# Patient Record
Sex: MALE | Race: WHITE | NOT HISPANIC OR LATINO | Employment: OTHER | URBAN - METROPOLITAN AREA
[De-identification: names, ages, dates, MRNs, and addresses within clinical notes are randomized per-mention and may not be internally consistent; named-entity substitution may affect disease eponyms.]

---

## 2023-01-25 ENCOUNTER — HOSPITAL ENCOUNTER (INPATIENT)
Facility: HOSPITAL | Age: 53
LOS: 14 days | Discharge: NURSING, REHAB OR LONG-TERM CARE | DRG: 064 | End: 2023-02-08
Attending: EMERGENCY MEDICINE | Admitting: INTERNAL MEDICINE
Payer: MEDICAID

## 2023-01-25 DIAGNOSIS — I63.9 CVA (CEREBRAL VASCULAR ACCIDENT): ICD-10-CM

## 2023-01-25 DIAGNOSIS — I63.9 STROKE: ICD-10-CM

## 2023-01-25 PROBLEM — I10 PRIMARY HYPERTENSION: Status: ACTIVE | Noted: 2023-01-25

## 2023-01-25 LAB
ALBUMIN SERPL-MCNC: 3.9 G/DL (ref 3.5–5)
ALBUMIN/GLOB SERPL: 1.1 RATIO (ref 1.1–2)
ALP SERPL-CCNC: 108 UNIT/L (ref 40–150)
ALT SERPL-CCNC: 30 UNIT/L (ref 0–55)
APTT PPP: 26 SECONDS (ref 23.2–33.7)
AST SERPL-CCNC: 20 UNIT/L (ref 5–34)
AV PEAK GRADIENT: 6 MMHG
AV VELOCITY RATIO: 0.45
BASOPHILS # BLD AUTO: 0.04 X10(3)/MCL (ref 0–0.2)
BASOPHILS NFR BLD AUTO: 0.5 %
BILIRUBIN DIRECT+TOT PNL SERPL-MCNC: 1.4 MG/DL
BSA FOR ECHO PROCEDURE: 2.4 M2
BUN SERPL-MCNC: 13 MG/DL (ref 8.4–25.7)
CALCIUM SERPL-MCNC: 9.2 MG/DL (ref 8.4–10.2)
CHLORIDE SERPL-SCNC: 105 MMOL/L (ref 98–107)
CHOLEST SERPL-MCNC: 180 MG/DL
CHOLEST/HDLC SERPL: 5 {RATIO} (ref 0–5)
CO2 SERPL-SCNC: 24 MMOL/L (ref 22–29)
CREAT SERPL-MCNC: 0.92 MG/DL (ref 0.73–1.18)
CV ECHO LV RWT: 0.57 CM
DOP CALC AO PEAK VEL: 1.19 M/S
DOP CALC LVOT AREA: 3.7 CM2
DOP CALC LVOT DIAMETER: 2.16 CM
DOP CALC LVOT PEAK VEL: 0.54 M/S
DOP CALC LVOT STROKE VOLUME: 40.65 CM3
DOP CALC MV VTI: 21.2 CM
DOP CALCLVOT PEAK VEL VTI: 11.1 CM
E WAVE DECELERATION TIME: 251.22 MSEC
E/A RATIO: 3.93
ECHO LV POSTERIOR WALL: 1.56 CM (ref 0.6–1.1)
EJECTION FRACTION: 26 %
EOSINOPHIL # BLD AUTO: 0.12 X10(3)/MCL (ref 0–0.9)
EOSINOPHIL NFR BLD AUTO: 1.5 %
ERYTHROCYTE [DISTWIDTH] IN BLOOD BY AUTOMATED COUNT: 14.9 % (ref 11.5–17)
FRACTIONAL SHORTENING: -97 % (ref 28–44)
GFR SERPLBLD CREATININE-BSD FMLA CKD-EPI: >60 MLS/MIN/1.73/M2
GLOBULIN SER-MCNC: 3.7 GM/DL (ref 2.4–3.5)
GLUCOSE SERPL-MCNC: 229 MG/DL (ref 74–100)
HCT VFR BLD AUTO: 44.4 % (ref 42–52)
HDLC SERPL-MCNC: 39 MG/DL (ref 35–60)
HGB BLD-MCNC: 14.2 GM/DL (ref 14–18)
IMM GRANULOCYTES # BLD AUTO: 0.03 X10(3)/MCL (ref 0–0.04)
IMM GRANULOCYTES NFR BLD AUTO: 0.4 %
INR BLD: 1.1 (ref 0–1.3)
INTERVENTRICULAR SEPTUM: 1.52 CM (ref 0.6–1.1)
LDLC SERPL CALC-MCNC: 125 MG/DL (ref 50–140)
LEFT ATRIUM SIZE: 5.07 CM
LEFT INTERNAL DIMENSION IN SYSTOLE: 10.81 CM (ref 2.1–4)
LEFT VENTRICLE DIASTOLIC VOLUME INDEX: 48.92 ML/M2
LEFT VENTRICLE DIASTOLIC VOLUME: 113.01 ML
LEFT VENTRICLE MASS INDEX: 167 G/M2
LEFT VENTRICLE SYSTOLIC VOLUME INDEX: 33.5 ML/M2
LEFT VENTRICLE SYSTOLIC VOLUME: 77.46 ML
LEFT VENTRICULAR INTERNAL DIMENSION IN DIASTOLE: 5.49 CM (ref 3.5–6)
LEFT VENTRICULAR MASS: 386.61 G
LVOT MG: 0.83 MMHG
LVOT MV: 0.44 CM/S
LYMPHOCYTES # BLD AUTO: 1.16 X10(3)/MCL (ref 0.6–4.6)
LYMPHOCYTES NFR BLD AUTO: 14.8 %
MCH RBC QN AUTO: 29.5 PG
MCHC RBC AUTO-ENTMCNC: 32 MG/DL (ref 33–36)
MCV RBC AUTO: 92.1 FL (ref 80–94)
MONOCYTES # BLD AUTO: 0.5 X10(3)/MCL (ref 0.1–1.3)
MONOCYTES NFR BLD AUTO: 6.4 %
MV MEAN GRADIENT: 2 MMHG
MV PEAK A VEL: 0.3 M/S
MV PEAK E VEL: 1.18 M/S
MV PEAK GRADIENT: 5 MMHG
MV STENOSIS PRESSURE HALF TIME: 72.85 MS
MV VALVE AREA BY CONTINUITY EQUATION: 1.92 CM2
MV VALVE AREA P 1/2 METHOD: 3.02 CM2
NEUTROPHILS # BLD AUTO: 5.98 X10(3)/MCL (ref 2.1–9.2)
NEUTROPHILS NFR BLD AUTO: 76.4 %
PISA MRMAX VEL: 4.48 M/S
PISA TR MAX VEL: 2.83 M/S
PLATELET # BLD AUTO: 187 X10(3)/MCL (ref 130–400)
PMV BLD AUTO: 11.3 FL (ref 7.4–10.4)
POTASSIUM SERPL-SCNC: 4.4 MMOL/L (ref 3.5–5.1)
PROT SERPL-MCNC: 7.6 GM/DL (ref 6.4–8.3)
PROTHROMBIN TIME: 14.1 SECONDS (ref 12.5–14.5)
PV PEAK VELOCITY: 0.69 CM/S
RA PRESSURE: 15 MMHG
RBC # BLD AUTO: 4.82 X10(6)/MCL (ref 4.7–6.1)
RIGHT VENTRICULAR END-DIASTOLIC DIMENSION: 2.98 CM
SODIUM SERPL-SCNC: 137 MMOL/L (ref 136–145)
TR MAX PG: 32 MMHG
TRIGL SERPL-MCNC: 79 MG/DL (ref 34–140)
TROPONIN I SERPL-MCNC: 0.05 NG/ML (ref 0–0.04)
TROPONIN I SERPL-MCNC: 0.05 NG/ML (ref 0–0.04)
TROPONIN I SERPL-MCNC: 0.06 NG/ML (ref 0–0.04)
TSH SERPL-ACNC: 1.8 UIU/ML (ref 0.35–4.94)
TV REST PULMONARY ARTERY PRESSURE: 47 MMHG
VLDLC SERPL CALC-MCNC: 16 MG/DL
WBC # SPEC AUTO: 7.8 X10(3)/MCL (ref 4.5–11.5)

## 2023-01-25 PROCEDURE — 84443 ASSAY THYROID STIM HORMONE: CPT | Performed by: EMERGENCY MEDICINE

## 2023-01-25 PROCEDURE — 36415 COLL VENOUS BLD VENIPUNCTURE: CPT | Performed by: EMERGENCY MEDICINE

## 2023-01-25 PROCEDURE — 84484 ASSAY OF TROPONIN QUANT: CPT | Performed by: EMERGENCY MEDICINE

## 2023-01-25 PROCEDURE — 20000000 HC ICU ROOM

## 2023-01-25 PROCEDURE — 80053 COMPREHEN METABOLIC PANEL: CPT | Performed by: EMERGENCY MEDICINE

## 2023-01-25 PROCEDURE — 93010 EKG 12-LEAD: ICD-10-PCS | Mod: ,,, | Performed by: INTERNAL MEDICINE

## 2023-01-25 PROCEDURE — 85730 THROMBOPLASTIN TIME PARTIAL: CPT | Performed by: EMERGENCY MEDICINE

## 2023-01-25 PROCEDURE — 96376 TX/PRO/DX INJ SAME DRUG ADON: CPT

## 2023-01-25 PROCEDURE — 85610 PROTHROMBIN TIME: CPT | Performed by: EMERGENCY MEDICINE

## 2023-01-25 PROCEDURE — 96366 THER/PROPH/DIAG IV INF ADDON: CPT

## 2023-01-25 PROCEDURE — 93005 ELECTROCARDIOGRAM TRACING: CPT

## 2023-01-25 PROCEDURE — 96365 THER/PROPH/DIAG IV INF INIT: CPT

## 2023-01-25 PROCEDURE — 25000003 PHARM REV CODE 250: Performed by: EMERGENCY MEDICINE

## 2023-01-25 PROCEDURE — 85025 COMPLETE CBC W/AUTO DIFF WBC: CPT | Performed by: EMERGENCY MEDICINE

## 2023-01-25 PROCEDURE — 80061 LIPID PANEL: CPT | Performed by: EMERGENCY MEDICINE

## 2023-01-25 PROCEDURE — 99285 EMERGENCY DEPT VISIT HI MDM: CPT | Mod: 25

## 2023-01-25 PROCEDURE — 93010 ELECTROCARDIOGRAM REPORT: CPT | Mod: ,,, | Performed by: INTERNAL MEDICINE

## 2023-01-25 PROCEDURE — 96375 TX/PRO/DX INJ NEW DRUG ADDON: CPT

## 2023-01-25 RX ORDER — LABETALOL HYDROCHLORIDE 5 MG/ML
10 INJECTION, SOLUTION INTRAVENOUS
Status: COMPLETED | OUTPATIENT
Start: 2023-01-25 | End: 2023-01-25

## 2023-01-25 RX ORDER — LORAZEPAM 2 MG/ML
0.5 INJECTION INTRAMUSCULAR EVERY 10 MIN PRN
Status: DISCONTINUED | OUTPATIENT
Start: 2023-01-25 | End: 2023-01-25

## 2023-01-25 RX ORDER — NICARDIPINE HYDROCHLORIDE 0.2 MG/ML
2.5 INJECTION INTRAVENOUS CONTINUOUS
Status: DISCONTINUED | OUTPATIENT
Start: 2023-01-25 | End: 2023-01-25

## 2023-01-25 RX ORDER — ONDANSETRON 2 MG/ML
4 INJECTION INTRAMUSCULAR; INTRAVENOUS EVERY 8 HOURS PRN
Status: DISCONTINUED | OUTPATIENT
Start: 2023-01-25 | End: 2023-02-08 | Stop reason: HOSPADM

## 2023-01-25 RX ORDER — NICARDIPINE HYDROCHLORIDE 0.2 MG/ML
2 INJECTION INTRAVENOUS CONTINUOUS
Status: DISPENSED | OUTPATIENT
Start: 2023-01-25 | End: 2023-01-26

## 2023-01-25 RX ORDER — CLOPIDOGREL BISULFATE 75 MG/1
300 TABLET ORAL
Status: COMPLETED | OUTPATIENT
Start: 2023-01-25 | End: 2023-01-25

## 2023-01-25 RX ORDER — NAPROXEN SODIUM 220 MG/1
324 TABLET, FILM COATED ORAL ONCE
Status: DISCONTINUED | OUTPATIENT
Start: 2023-01-25 | End: 2023-02-08 | Stop reason: HOSPADM

## 2023-01-25 RX ORDER — SODIUM CHLORIDE 0.9 % (FLUSH) 0.9 %
10 SYRINGE (ML) INJECTION
Status: DISCONTINUED | OUTPATIENT
Start: 2023-01-25 | End: 2023-02-08 | Stop reason: HOSPADM

## 2023-01-25 RX ORDER — MUPIROCIN 20 MG/G
OINTMENT TOPICAL 2 TIMES DAILY
Status: DISPENSED | OUTPATIENT
Start: 2023-01-25 | End: 2023-01-30

## 2023-01-25 RX ORDER — NAPROXEN SODIUM 220 MG/1
324 TABLET, FILM COATED ORAL ONCE
Status: COMPLETED | OUTPATIENT
Start: 2023-01-25 | End: 2023-01-25

## 2023-01-25 RX ORDER — LORAZEPAM 1 MG/1
1 TABLET ORAL EVERY 6 HOURS PRN
Status: DISCONTINUED | OUTPATIENT
Start: 2023-01-25 | End: 2023-02-08 | Stop reason: HOSPADM

## 2023-01-25 RX ORDER — CLOPIDOGREL BISULFATE 75 MG/1
75 TABLET ORAL DAILY
Status: DISCONTINUED | OUTPATIENT
Start: 2023-01-26 | End: 2023-02-08 | Stop reason: HOSPADM

## 2023-01-25 RX ADMIN — LABETALOL HYDROCHLORIDE 10 MG: 5 INJECTION, SOLUTION INTRAVENOUS at 08:01

## 2023-01-25 RX ADMIN — CLOPIDOGREL BISULFATE 300 MG: 75 TABLET ORAL at 01:01

## 2023-01-25 RX ADMIN — LABETALOL HYDROCHLORIDE 10 MG: 5 INJECTION, SOLUTION INTRAVENOUS at 07:01

## 2023-01-25 RX ADMIN — ASPIRIN 324 MG: 81 TABLET, CHEWABLE ORAL at 08:01

## 2023-01-25 RX ADMIN — NICARDIPINE HYDROCHLORIDE 2.5 MG/HR: 0.2 INJECTION, SOLUTION INTRAVENOUS at 10:01

## 2023-01-25 RX ADMIN — NICARDIPINE HYDROCHLORIDE 0.5 MG/HR: 0.2 INJECTION, SOLUTION INTRAVENOUS at 05:01

## 2023-01-25 RX ADMIN — LABETALOL HYDROCHLORIDE 10 MG: 5 INJECTION, SOLUTION INTRAVENOUS at 09:01

## 2023-01-25 NOTE — CONSULTS
Vascular Neurology Brief Note    Recommendations for stroke management discussed with ED physician.  53 y/o man presenting with R sided weakness (LKN >24 hrs).  MRI confirms acute small vessel stroke in L CR.  Recommend admission for PT/OT/SLP, and further workup.  Load with clopidogrel 300 mg x 1, then continue 75 mg daily.  Also start ASA 81 mg daily and high intensity statin.  BP roughly 160-180 systolic (given troponin leak).    Nhung Anderson MD  Vascular Neurology

## 2023-01-25 NOTE — ED PROVIDER NOTES
Encounter Date: 1/25/2023       History     Chief Complaint   Patient presents with    Cerebrovascular Accident     Pt and wife report pt having slurred speech and rt sided weakness with difficulty walking. Rt sided facial droop noted. Symptom started yesterday am.     Patient presents with his wife said that yesterday morning when he woke up at 5:30 a.m. is normal time.  His right face was drooping his speech was slurred his right arm and right leg were working right.  Wife and patient states there were okay when he went to bed Monday night.  Did not get any care for this yesterday came to emergency department this morning.      Past medical history he had COVID in April of 2021.  He has no prescription medications he has never been diagnosed with hypertension he has never had a CVA before.  He does not have any history of court heart irregularities or heart disease.  He does not get regular health care.  Patient said that symptoms have been persistent having necessarily worsened but have improved.    Wife said that a friend told her about a shot that she could take when you have a stroke.  Asked her if it was tPA and the wife said yes.  I did explain to her that there is a 4-1/2 hour window for that.  And his time of onset his minimum of 24 hours.  Probably longer than that because it happened sometime in the sleepy is not a candidate for thrombolytics.    Patient denies headache patient denies nausea vomiting patient denies chest pain or shortness of breath.  Patient says the left side is fine.  Both wife and patient said his speech is slightly off.  He does have some facial asymmetry he does not have any visual field issues when asked in checked.  Social history  works as a auto body pain or.  Does not do tobacco does not do drugs does drink on the weekend maybe a 12 pack of beer on Saturdays only.    Mother is alive has survived cancers in the past dad is alive he has eye issues.      No COVID vaccines  no pneumonia vaccines no flu vaccines tetanus less than 5 years.      Past medical history patient denies any history of hypertension but wife does say when he was taken over-the-counter cough and cold medications with COVID his blood pressure was elevated.    Past surgical history ingrown toenail.    NO  physician the patient says many years ago he used to see Dr. Moon in [place]  longer has a family physician.   He has not had any recent healthcare.    Review of patient's allergies indicates:  No Known Allergies  No past medical history on file.  No past surgical history on file.  No family history on file.     Review of Systems   Constitutional:  Negative for fever.   HENT:  Negative for sore throat.    Eyes: Negative.    Respiratory:  Negative for shortness of breath.    Cardiovascular:  Negative for chest pain.   Gastrointestinal:  Negative for nausea.   Endocrine: Negative.    Genitourinary:  Negative for dysuria.   Musculoskeletal:  Negative for back pain.   Skin:  Negative for rash.   Allergic/Immunologic: Negative.    Neurological:  Positive for facial asymmetry, speech difficulty and weakness (Right arm right leg weakness slightly slurred speech right facial droop). Negative for headaches.   Hematological:  Does not bruise/bleed easily.   Psychiatric/Behavioral: Negative.     All other systems reviewed and are negative.    Physical Exam     Initial Vitals [01/25/23 0658]   BP Pulse Resp Temp SpO2   (!) 211/128 80 18 98 °F (36.7 °C) 98 %      MAP       --         Physical Exam    Nursing note and vitals reviewed.  Constitutional: He appears well-developed and well-nourished.   Barrel chested heavyset white male hopefully awake oriented speech is easily understood there is a slight abnormality in his speech not necessarily grossly slurred but just off.  Sometimes stumbles for word.  Does not have expressive or receptive aphasia that I can detect does have obvious right-sided facial weakness   HENT:   Head:  Normocephalic and atraumatic.   Right Ear: Tympanic membrane and external ear normal.   Left Ear: Tympanic membrane and external ear normal.   Nose: Nose normal.   Mouth/Throat: Oropharynx is clear and moist and mucous membranes are normal. Oral lesions: moist muc memb.   Eyes: Conjunctivae and EOM are normal. Pupils are equal, round, and reactive to light.   There is no evidence of any visual field cut peripheral vision intact bilaterally   Neck: Neck supple. No thyromegaly present. No tracheal deviation present. No JVD present.   Normal range of motion.  Cardiovascular:  Normal rate, regular rhythm, normal heart sounds and intact distal pulses.     Exam reveals no gallop and no friction rub.       No murmur heard.  I do not appreciate irregular heart rate   Pulmonary/Chest: Breath sounds normal. No stridor. No respiratory distress. He has no wheezes. He has no rhonchi. He has no rales. He exhibits no tenderness.   Abdominal: Abdomen is soft. Bowel sounds are normal. He exhibits no distension and no mass. No signs of injury. There is no abdominal tenderness.   Genitourinary:    Genitourinary Comments: No CVA tenderness     Musculoskeletal:         General: No tenderness or edema. Normal range of motion.      Cervical back: Normal range of motion and neck supple.     Lymphadenopathy:     He has no cervical adenopathy.   Neurological: He is alert and oriented to person, place, and time. A cranial nerve deficit (Slight drooping of the right side of the face) is present. No sensory deficit. GCS score is 15. GCS eye subscore is 4. GCS verbal subscore is 5. GCS motor subscore is 6.   His right leg is 4/5 strength both in lifting it up at the knee and foot plantar and dorsiflexion.  Extensor hallucis longus all 4/5 left side is 5/5  strength 4/5 in the right arm 5/5 in the left arm.  He has mild discoordination mild ataxia right arm and right leg   Skin: Skin is warm and dry. Capillary refill takes 2 to 3 seconds. No  rash noted.   Psychiatric: He has a normal mood and affect. His behavior is normal. Judgment and thought content normal.       ED Course   Critical Care    Date/Time: 1/25/2023 2:43 PM  Performed by: Andres Balderas MD  Authorized by: Andres Balderas MD   Direct patient critical care time: 18 minutes  Additional history critical care time: 5 minutes  Ordering / reviewing critical care time: 15 minutes  Documentation critical care time: 12 minutes  Consulting other physicians critical care time: 9 minutes  Consult with family critical care time: 12 minutes  Total critical care time (exclusive of procedural time) : 71 minutes  Critical care time was exclusive of separately billable procedures and treating other patients.      Labs Reviewed   COMPREHENSIVE METABOLIC PANEL - Abnormal; Notable for the following components:       Result Value    Glucose Level 229 (*)     Globulin 3.7 (*)     All other components within normal limits   TROPONIN I - Abnormal; Notable for the following components:    Troponin-I 0.064 (*)     All other components within normal limits   CBC WITH DIFFERENTIAL - Abnormal; Notable for the following components:    MCHC 32.0 (*)     MPV 11.3 (*)     All other components within normal limits   TROPONIN I - Abnormal; Notable for the following components:    Troponin-I 0.049 (*)     All other components within normal limits   TROPONIN I - Abnormal; Notable for the following components:    Troponin-I 0.048 (*)     All other components within normal limits   PROTIME-INR - Normal   TSH - Normal   APTT - Normal   CBC W/ AUTO DIFFERENTIAL    Narrative:     The following orders were created for panel order CBC W/ AUTO DIFFERENTIAL.  Procedure                               Abnormality         Status                     ---------                               -----------         ------                     CBC with Differential[[id number]]        Abnormal            Final result                 Please view  results for these tests on the individual orders.   LIPID PANEL   TROPONIN I   POCT GLUCOSE, HAND-HELD DEVICE     Recent Results (from the past 9 hour(s))   Echo Saline Bubble? No    Collection Time: 01/25/23 11:04 AM   Result Value Ref Range    Left Ventricular Outflow Tract Mean Gradient 0.83 mmHg    Left Ventricular Outflow Tract Mean Velocity 0.44 cm/s    MV peak gradient 5 mmHg    MV mean gradient 2 mmHg    MV stenosis pressure 1/2 time 72.85 ms    Mr max scar 4.48 m/s    MV VTI 21.2 cm    TR Max Scar 2.83 m/s    Ao peak scar 1.19 m/s    Posterior Wall 1.56 (A) 0.6 - 1.1 cm    LVOT diameter 2.16 cm    LVOT peak scar 0.54 m/s    LVOT peak VTI 11.10 cm    E wave deceleration time 251.22 msec    MV Peak A Scar 0.30 m/s    MV Peak E Scar 1.18 m/s    IVS 1.52 0.6 - 1.1 cm    LVIDd 5.49 3.5 - 6.0 cm    LVIDs 10.81 (A) 2.1 - 4.0 cm    PV PEAK VELOCITY 0.69 cm/s    LV Systolic Volume 77.46 mL    LV Diastolic Volume 113.01 mL    RVDD 2.98 cm    LA size 5.07 cm    FS -97 %    LV mass 386.61 g    Left Ventricle Relative Wall Thickness 0.57 cm    AV Velocity Ratio 0.45     MV valve area p 1/2 method 3.02 cm2    MV valve area by continuity eq 1.92 cm2    E/A ratio 3.93     LVOT area 3.7 cm2    LVOT stroke volume 40.65 cm3    AV peak gradient 6 mmHg    LV Systolic Volume Index 33.5 mL/m2    LV Diastolic Volume Index 48.92 mL/m2    LV Mass Index 167 g/m2    Triscuspid Valve Regurgitation Peak Gradient 32 mmHg    BSA 2.4 m2    EF 26 %    Right Atrial Pressure (from IVC) 15 mmHg    TV rest pulmonary artery pressure 47 mmHg   Troponin I    Collection Time: 01/25/23 12:59 PM   Result Value Ref Range    Troponin-I 0.049 (H) 0.000 - 0.045 ng/mL   Troponin I    Collection Time: 01/25/23  4:57 PM   Result Value Ref Range    Troponin-I 0.048 (H) 0.000 - 0.045 ng/mL         EKG Readings: (Independently Interpreted)   Initial Reading: No STEMI. Rhythm: Sinus Arrhythmia. Ectopy: PACs Less than or equal to 6/min. T Waves Flipped: I, V5 and V6.    EKG is performed at 7:44 a.m. 93 per sinus rhythm with PACs there is T-wave inversion in leads 5 6 leads 1.  There is no ST segment elevation   ECG Results              ECG 12 lead (Final result)  Result time 01/25/23 11:19:50      Final result by Nan Arthur [facility] (01/25/23 11:19:50)                   Narrative:    Test Reason : I63.9,    Vent. Rate : 093 BPM     Atrial Rate : 093 BPM     P-R Int : 116 ms          QRS Dur : 098 ms      QT Int : 378 ms       P-R-T Axes : 065 012 157 degrees     QTc Int : 469 ms    Sinus rhythm with Premature atrial complexes  ST and T wave abnormality, consider lateral ischemia  Prolonged QT  Abnormal ECG  No previous ECGs available  Confirmed by Benjamín Redmond MD ([id number]) on 1/25/2023 11:19:42 AM    Referred By: AKBAR   SELF           Confirmed By:Benjamín Redmond MD                                  Imaging Results              MRI Brain Without Contrast (Final result)  Result time 01/25/23 10:00:00      Final result by Andres Romero MD (01/25/23 10:00:00)                   Impression:      1. Mild motion artifact  2. Somewhat bilobed focus of abnormal signal intensity at the posterior left corona radiata/superior left thalamus compatible with a focus of acute ischemia measuring approximately 2 x 1 cm  3. Generalized cerebral and cerebellar atrophy  4. Scattered foci of abnormal signal intensity at the periventricular and subcortical white matter suspicious for foci of ischemia versus gliosis      Electronically signed by: Andres Romero  Date:    01/25/2023  Time:    10:00               Narrative:    EXAMINATION:  MRI BRAIN WITH CONTRAST:    CLINICAL HISTORY:  , Stroke, follow up;    COMPARISON:  None available    FINDINGS:  Serial axial,coronal, and sagittal 1.5 Carina images were obtained of the brain using T1 and T2- weighted techniques the administration of IV contrast. Ventricles, cisterns, and sulci are mildly prominent size.  There is no evidence of midline shift,  mass effect, or abnormal extra-axial fluid collections.  Flow void is noted to the superior sagittal sinus and visualized intracranial vessels on T2 sequence imaging.  The right vertebral artery slightly dominant.  There is minimal scattered mucosal thickening at the ethmoid sinuses.  Orbital globes are symmetric in size shape and signal intensity.  Cerebellar tonsils extend caudally to the level of the foramen magnum.  There is focal abnormal somewhat bilobed signal intensity at the posterior left corona radiata and superior left thalamus on diffusion-weighted imaging compatible with a focus of acute ischemia measuring approximately 2 x 1 cm.  No other focus of abnormal signal intensity is identified on diffusion weighted imaging.  There is mild motion artifact.  Scattered small foci of abnormal signal intensity are evident at the periventricular and subcortical white matter on FLAIR sequence imaging.                                       US Carotid Bilateral (Final result)  Result time 01/25/23 09:49:45      Final result by Andres Romero MD (01/25/23 09:49:45)                   Impression:      1. No significant localized atherosclerotic plaque formation or stenosis noted to the visualized portions of the carotid arteries bilaterally.      Electronically signed by: Andres Romero  Date:    01/25/2023  Time:    09:49               Narrative:    EXAMINATION:  US CAROTID BILATERAL    CLINICAL HISTORY:  , Cerebral infarction, unspecified.    COMPARISON:  None available    FINDINGS:  Real-time imaging was performed through the right and left carotid arteries using duplex Doppler imaging. NASCET utilized. Mild scattered plaque formation at the carotid bulbs and proximal internal are carotid arteries bilaterally with no significant stenosis identified.  Antegrade flow is evident within the vertebral arteries bilaterally.    MEASUREMENTS:    Right Systolic Velocities(cm/s):    Internal Carotid: 65.3 cm/second    Common  Carotid: 78.1 cm/second    Right IC:CC Ratio: 0.8    Left Systolic Velocities(cm/s):    Internal Carotid: 77.9 cm/second    Common Carotid: 80.1 cm/second    Left IC:CC Ratio: 1.0                                       X-Ray Chest AP Portable (Final result)  Result time 01/25/23 09:25:06      Final result by Andres Romero MD (01/25/23 09:25:06)                   Impression:      1. Mild cardiomegaly  2. Prominent pulmonary vasculature  3. Thoracic spondylosis      Electronically signed by: Andres Romero  Date:    01/25/2023  Time:    09:25               Narrative:    EXAMINATION:  XR CHEST AP PORTABLE    CLINICAL HISTORY:  Stroke;, .    COMPARISON:  None available    FINDINGS:  An AP view or more reveals the heart to be mildly enlarged.  The trachea is midline.  Pulmonary vasculature is prominent.  No consolidative infiltrate or effusion is seen.  Degenerative changes are noted to the thoracic spine.                                       CT Head Without Contrast (Final result)  Result time 01/25/23 07:53:25      Final result by Sunil Coffman MD (01/25/23 07:53:25)                   Impression:      No acute intracranial abnormality.    Supratentorial white matter changes, while nonspecific, most likely sequela of chronic microvascular ischemia.      Electronically signed by: Sunil Coffman MD  Date:    01/25/2023  Time:    07:53               Narrative:    EXAMINATION:  CT of the head without contrast    CLINICAL HISTORY:  Right-sided weakness, numbness    TECHNIQUE:  Routine CT of the head was performed without intravenous contrast    Total DLP: 1787 mGy.cm    Automatic exposure control was utilized to reduce the patient's dose    COMPARISON:  None    FINDINGS:  There is no acute intracranial hemorrhage, midline shift, mass-effect, or extra-axial collection.  The ventricular system is normal in size and configuration.  There are mild patchy areas of decreased attenuation in the periventricular, deep,  subcortical white matter, while nonspecific, most commonly sequela of chronic microvascular ischemia.  No sulcal effacement.  Gray-white matter differentiation is preserved.  Visualized osseous structures are intact.  Visualized paranasal sinuses and mastoid air cells are clear                                       Medications   LORazepam injection 0.5 mg (has no administration in time range)   clopidogreL tablet 75 mg (has no administration in time range)   sodium chloride 0.9% flush 10 mL (has no administration in time range)   ondansetron injection 4 mg (has no administration in time range)   aspirin chewable tablet 324 mg (has no administration in time range)   niCARdipine 40 mg/200 mL (0.2 mg/mL) infusion (0.5 mg/hr Intravenous New Bag 1/25/23 1700)   labetaloL injection 10 mg (10 mg Intravenous Given 1/25/23 0749)   aspirin chewable tablet 324 mg (324 mg Oral Given 1/25/23 0800)   labetaloL injection 10 mg (10 mg Intravenous Given 1/25/23 0827)   labetaloL injection 10 mg (10 mg Intravenous Given 1/25/23 0910)   clopidogreL tablet 300 mg (300 mg Oral Given 1/25/23 1320)     Medical Decision Making:   Initial Assessment:   Pleasant male who presents to the emergency department woke up yesterday morning with the same symptoms right face speech right arm right leg symptoms.  Did not get care yesterday  Thought maybe was discussed he was taken the cough and cold medication contains phenylephrine.  Differential Diagnosis:   Stroke, hemorrhagic versus ischemic, neurological disorder.  Bell's palsy.  Clinical Tests:   Lab Tests: Ordered  Radiological Study: Ordered  Additional MDM:     NIH Stroke Scale:   Level of consciousness = 0 - alert  LOC questions = 0 - answers both correctly  LOC commands = 0 - performs both correctly  Best gaze = 0 - normal  Visual = 0 - no visual loss  Facial palsy = 1 - minor  Motor left arm =  0 - no drift  Motor right arm =  1 - drift  Motor left leg = 0 - no drift  Motor right leg =  1  - drift  Limb ataxia = 2 - present in two limbs  Sensory = 0 - normal  Best language = 0 - no aphasia  Dysarthria = 1 - mild to moderate dysarthria  Extinction and inattention = 0 - no neglect  NIH Stroke Scale Total = 6        ED Course as of 01/25/23 1729   Wed Jan 25, 2023   0800 I spoke to the wife and the patient again told them the CT scan did not show a hemorrhagic event no bleeding stroke.  I also explained that while it did not show any ischemic stroke a blocked up blood vessel type stroke that clinically he had had a stroke.  The wife asked me about Bell's palsy I explained to her the Bell's palsy does not affect the arm or the leg I told him about an MRI scan and that has been ordered.  Patient is condition is unchanged currently [DM]   0801 I did inform the family of the blood pressure treatment and may end up with an intravenous drip.  Also involving the family the slightly elevated troponin that will be repeated at 9:45 a.m.. [DM]   1036 Talk to the wife blood pressure is still up diastolic 118 nicardipine is initiated systolic is 180 talk to the wife about the negative carotid ultrasound talk to the wife about the MRI BM positive for a left thalamus left corona radiata odd ischemic infarct 2 x 1 cm.  Told the wife that we do not have an ICU bed that we may need to transfer him were going to put a transfer request in.  [DM]   1321 After speaking with Dr. Anderson neurologist oxygen her [facility] patient is going to be given Plavix 300 mg load as well as 75 mg a day his blood pressure came down it is 159 now over 99 he is relatively asymptomatic just the same things he came in with nothing worse I talked to them about the elevated glucose we do not have the echocardiogram back in Dr. Anderson recommended if the patient stays PT and speech therapy.  To help correct the deficits we fine keep the blood pressure under control [DM]   1336 The 2nd troponin has dropped very nicely.  It is barely above  the high normal now of 0.45 at 0.49    Patient has been off of the nicardipine drip.  In his blood pressure seems to be maintaining [DM]   1442 Patient's blood pressure did go back up diastolic above 43904 nicardipine restarted.  New parameters given to us by Dr. Anderson the neurologist at [facility] 150-180 systolic [DM]   1446 · The left ventricle is normal in size with moderate concentric hypertrophy and severely decreased systolic function.  · The estimated ejection fraction is 26%.  · Grade III left ventricular diastolic dysfunction.  · Mild left atrial enlargement.  · Mild tricuspid regurgitation.  · Mild mitral regurgitation.      [DM]   1449 Echocardiogram above apparently had longstanding hypertension shows a hypertrophic left ventricle with severely decreased systolic function [DM]   1450 A video cardiac consultation was obtained.  I spoke to them they said they would be glad to follow in consult the went in the room to excess and talked to the patient and his wife Lazarus Biswas was the person on the video conference [DM]      ED Course User Index  [DM] Andres Balderas MD                 Clinical Impression:   Final diagnoses:  [I63.9] Stroke  [I63.9] CVA (cerebral vascular accident)        ED Disposition Condition    Admit                 Andres Balderas MD  01/25/23 6985

## 2023-01-25 NOTE — CONSULTS
[facility] - Emergency Dept  Cardiology  Consult Note    Patient Name: Manpreet Josue  MRN: [id number]  Admission Date: 1/25/2023  Hospital Length of Stay: 0 days  Code Status: No Order   Attending Provider: Andres Balderas MD   Consulting Provider: Lazarus Biswas NP  Primary Care Physician: Primary Doctor No  Principal Problem:<principal problem not specified>    Patient information was obtained from patient, ER records, and primary team.     Consults  Subjective:     Chief Complaint:      Tele cardiology consult  Location:  [facility] in a General ER  Reason for consultation:  Elevated troponin, depressed LV function and CVA  Consulting provider:  Dr. Balderas  Duration:  Greater than 30 minutes including review of medical records, patient and provider interview    HPI:     52-year-old female unknown to our services.  His symptoms started yesterday morning with some disorientation and difficulty speaking.  He also had some weakness in his right hand and right leg.  He wrote this out most of the day went to bed this morning when he awoke his speech was more slurred.  It was decided that he should come into the ER for an evaluation.  Upon arrival systolic blood pressure was 220/125, EKG was done which showed a normal sinus rhythm and some lateral T-wave inversions.  Lab work showed a minimally elevated troponin that tapered down with the 2nd set.  Initial CT scan was negative.  Chest x-ray was consistent with some vascular congestion.  Carotid ultrasound was negative.  MRI did show left thalamic infarct.  He was seen by vascular Neurology who loaded him with Plavix aspirin and high-intensity statin.  I have also recommended PT OT.    From cardiac standpoint he states he had been short of breath since he had COVID in April of 2021.  He gets short of breath with activity and sounds like he gets orthopneic in the evenings and has to sit up to breathe with a good bit of mucus production.  He denies any overt  chest discomfort.  He denies any previous cardiac history.    Preliminary echocardiogram shows an EF of 25% with marked LVH    He had to be placed on a Cardene drip for control of his blood pressure.    No home medications  Denies smoking or drugs  ETOH on weekends       Works in a body shop as a  for 35 years     No significant family Hx CAD/CVA       No past medical history on file.    No past surgical history on file.    Review of patient's allergies indicates:  No Known Allergies    No current facility-administered medications on file prior to encounter.     No current outpatient medications on file prior to encounter.     Family Hx:  M L cancer  F L eye issues    Tobacco Use    Smoking status: Not on file    Smokeless tobacco: Not on file   Substance and Sexual Activity    Alcohol use: Not on file    Drug use: Not on file    Sexual activity: Not on file     Review of Systems   Constitutional: Negative.   HENT:  Positive for congestion.    Eyes:  Negative for blurred vision and visual disturbance.   Cardiovascular:  Positive for dyspnea on exertion and orthopnea. Negative for chest pain, claudication, irregular heartbeat, leg swelling and near-syncope.   Respiratory:  Positive for shortness of breath and sleep disturbances due to breathing.    Skin: Negative.    Gastrointestinal: Negative.    Genitourinary: Negative.    Neurological:         R UE and L weakness, facial droop    Psychiatric/Behavioral: Negative.     Allergic/Immunologic: Negative.    Objective:     Vital Signs (Most Recent):  Temp: 98 °F (36.7 °C) (01/25/23 0658)  Pulse: 74 (01/25/23 1443)  Resp: 14 (01/25/23 1443)  BP: (!) 171/96 (01/25/23 1443)  SpO2: 95 % (01/25/23 1443)   Vital Signs (24h Range):  Temp:  [98 °F (36.7 °C)] 98 °F (36.7 °C)  Pulse:  [68-94] 74  Resp:  [12-23] 14  SpO2:  [93 %-99 %] 95 %  BP: (157-215)/() 171/96     Weight: 117.9 kg (260 lb)  Body mass index is 38.4 kg/m².    SpO2: 95 %       No intake or  output data in the 24 hours ending 01/25/23 1505    Lines/Drains/Airways       Peripheral Intravenous Line  Duration                  Peripheral IV - Single Lumen 01/25/23 0705 20 G Left;Posterior Hand <1 day                    Physical Exam  Constitutional:       Appearance: Normal appearance. He is obese.   HENT:      Head: Normocephalic and atraumatic.      Nose: Nose normal.      Mouth/Throat:      Mouth: Mucous membranes are moist.   Eyes:      Conjunctiva/sclera: Conjunctivae normal.   Cardiovascular:      Rate and Rhythm: Normal rate and regular rhythm.   Pulmonary:      Effort: Pulmonary effort is normal.      Breath sounds: Normal breath sounds.   Abdominal:      General: Bowel sounds are normal.      Palpations: Abdomen is soft.   Musculoskeletal:      Cervical back: Normal range of motion.      Comments: R sided weakness    Neurological:      Mental Status: He is alert.      Sensory: Sensory deficit present.      Motor: Weakness present.   Psychiatric:         Mood and Affect: Mood normal.         Behavior: Behavior normal.       Significant Labs:   Recent Lab Results         01/25/23  1259   01/25/23  0749   01/25/23  0748   01/25/23  0743        Albumin/Globulin Ratio     1.1         Albumin     3.9         Alkaline Phosphatase     108         ALT     30         aPTT   26.0  Comment: For Minimal Heparin Infusion, the goal aPTT 64-85 seconds corresponds to an anti-Xa of 0.3-0.5.    For Low Intensity and High Intensity Heparin, the goal aPTT  seconds corresponds to an anti-Xa of 0.3-0.7           AST     20         Baso #       0.04       Basophil %       0.5       BILIRUBIN TOTAL     1.4         BUN     13.0         Calcium     9.2         Chloride     105         Cholesterol     180         CO2     24         Creatinine     0.92         eGFR     >60         Eos #       0.12       Eosinophil %       1.5       Globulin, Total     3.7         Glucose     229         HDL     39         Hematocrit        44.4       Hemoglobin       14.2       Immature Grans (Abs)       0.03       Immature Granulocytes       0.4       INR   1.10           LDL Cholesterol External     125.00         Lymph #       1.16       LYMPH %       14.8       MCH       29.5       MCHC       32.0       MCV       92.1       Mono #       0.50       Mono %       6.4       MPV       11.3       Neut #       5.98       Neut %       76.4       Platelets       187       Potassium     4.4         PROTEIN TOTAL     7.6         Protime   14.1           RBC       4.82       RDW       14.9       Sodium     137         Thyroid Stimulating Hormone     1.799         Total Cholesterol/HDL Ratio     5         Triglycerides     79         Troponin I 0.049     0.064         Very Low Density Lipoprotein     16         WBC       7.8               Significant Imaging:     EXAMINATION:   MRI BRAIN WITH CONTRAST:     CLINICAL HISTORY:   , Stroke, follow up;     COMPARISON:   None available     FINDINGS:   Serial axial,coronal, and sagittal 1.5 Craina images were obtained of the brain using T1 and T2- weighted techniques the administration of IV contrast. Ventricles, cisterns, and sulci are mildly prominent size.  There is no evidence of midline shift, mass effect, or abnormal extra-axial fluid collections.  Flow void is noted to the superior sagittal sinus and visualized intracranial vessels on T2 sequence imaging.  The right vertebral artery slightly dominant.  There is minimal scattered mucosal thickening at the ethmoid sinuses.  Orbital globes are symmetric in size shape and signal intensity.  Cerebellar tonsils extend caudally to the level of the foramen magnum.  There is focal abnormal somewhat bilobed signal intensity at the posterior left corona radiata and superior left thalamus on diffusion-weighted imaging compatible with a focus of acute ischemia measuring approximately 2 x 1 cm.  No other focus of abnormal signal intensity is identified on diffusion weighted  imaging.  There is mild motion artifact.  Scattered small foci of abnormal signal intensity are evident at the periventricular and subcortical white matter on FLAIR sequence imaging.    Impression:       1. Mild motion artifact   2. Somewhat bilobed focus of abnormal signal intensity at the posterior left corona radiata/superior left thalamus compatible with a focus of acute ischemia measuring approximately 2 x 1 cm   3. Generalized cerebral and cerebellar atrophy   4. Scattered foci of abnormal signal intensity at the periventricular and subcortical white matter suspicious for foci of ischemia versus gliosis      US Carotid Bilateral [[id number]] Resulted: 01/25/23 0949   Order Status: Completed Updated: 01/25/23 0952   Narrative:     EXAMINATION:   US CAROTID BILATERAL     CLINICAL HISTORY:   , Cerebral infarction, unspecified.     COMPARISON:   None available     FINDINGS:   Real-time imaging was performed through the right and left carotid arteries using duplex Doppler imaging. NASCET utilized. Mild scattered plaque formation at the carotid bulbs and proximal internal are carotid arteries bilaterally with no significant stenosis identified.  Antegrade flow is evident within the vertebral arteries bilaterally.     MEASUREMENTS:     Right Systolic Velocities(cm/s):     Internal Carotid: 65.3 cm/second     Common Carotid: 78.1 cm/second     Right IC:CC Ratio: 0.8     Left Systolic Velocities(cm/s):     Internal Carotid: 77.9 cm/second     Common Carotid: 80.1 cm/second     Left IC:CC Ratio: 1.0    Impression:       1. No significant localized atherosclerotic plaque formation or stenosis noted to the visualized portions of the carotid arteries bilaterally.      X-Ray Chest AP Portable [[id number]] Resulted: 01/25/23 0925   Order Status: Completed Updated: 01/25/23 0927   Narrative:     EXAMINATION:   XR CHEST AP PORTABLE     CLINICAL HISTORY:   Stroke;, .     COMPARISON:   None available     FINDINGS:   An AP view or  more reveals the heart to be mildly enlarged.  The trachea is midline.  Pulmonary vasculature is prominent.  No consolidative infiltrate or effusion is seen.  Degenerative changes are noted to the thoracic spine.    Impression:       1. Mild cardiomegaly   2. Prominent pulmonary vasculature   3. Thoracic spondylosis       Electronically signed by: Andres Romero   Date: 01/25/2023   Time: 09:25   CT Head Without Contrast [[id number]] Resulted: 01/25/23 0753   Order Status: Completed Updated: 01/25/23 0755   Narrative:     EXAMINATION:   CT of the head without contrast     CLINICAL HISTORY:   Right-sided weakness, numbness     TECHNIQUE:   Routine CT of the head was performed without intravenous contrast     Total DLP: 1787 mGy.cm     Automatic exposure control was utilized to reduce the patient's dose     COMPARISON:   None     FINDINGS:   There is no acute intracranial hemorrhage, midline shift, mass-effect, or extra-axial collection.  The ventricular system is normal in size and configuration.  There are mild patchy areas of decreased attenuation in the periventricular, deep, subcortical white matter, while nonspecific, most commonly sequela of chronic microvascular ischemia.  No sulcal effacement.  Gray-white matter differentiation is preserved.  Visualized osseous structures are intact.  Visualized paranasal sinuses and mastoid air cells are clear    Impression:       No acute intracranial abnormality.            Assessment and Plan:       Impression:  Acute CVA ? Etiology  HTN Urgency /125  > trop  - Type II leak secondary to HTN/CVA  - Abnormal EKG  Depressed LVF on Echo  - Official read pending    Plan:  Agree with admission to ICU  Permissive hypertension secondary to CVA however he is currently needing a Cardene drip to maintain systolic blood pressure in the 150s to 170s  Agree with aspirin and Plavix per Neurology  Agree with high-intensity statin  Will need to initiate heart failure  medications  Continue with telemetry monitoring will need a possibleLINQ implantation if no evidence of atrial fibrillation  May need to consider a TUSHAR if views are limited by transthoracic echo  Will need workup for diminished LV function that can be done as an outpatient at this time    Thank you for the consultation should she have any questions or concerns please do not hesitate to contact us.  We will follow along with him in the hospital    There are no hospital problems to display for this patient.      VTE Risk Mitigation (From admission, onward)      None              Lazarus Biswas NP  Cardiology   [facility] - Emergency Dept

## 2023-01-26 LAB — POCT GLUCOSE: 206 MG/DL (ref 70–110)

## 2023-01-26 PROCEDURE — 25000003 PHARM REV CODE 250: Performed by: INTERNAL MEDICINE

## 2023-01-26 PROCEDURE — 20000000 HC ICU ROOM

## 2023-01-26 PROCEDURE — 94761 N-INVAS EAR/PLS OXIMETRY MLT: CPT

## 2023-01-26 PROCEDURE — 97166 OT EVAL MOD COMPLEX 45 MIN: CPT

## 2023-01-26 PROCEDURE — 92523 SPEECH SOUND LANG COMPREHEN: CPT

## 2023-01-26 PROCEDURE — 92610 EVALUATE SWALLOWING FUNCTION: CPT

## 2023-01-26 PROCEDURE — 25000003 PHARM REV CODE 250: Performed by: NURSE PRACTITIONER

## 2023-01-26 PROCEDURE — 97162 PT EVAL MOD COMPLEX 30 MIN: CPT

## 2023-01-26 PROCEDURE — 27000221 HC OXYGEN, UP TO 24 HOURS

## 2023-01-26 RX ORDER — NICARDIPINE HYDROCHLORIDE 0.2 MG/ML
2 INJECTION INTRAVENOUS CONTINUOUS
Status: DISPENSED | OUTPATIENT
Start: 2023-01-26 | End: 2023-01-28

## 2023-01-26 RX ORDER — METOPROLOL SUCCINATE 25 MG/1
25 TABLET, EXTENDED RELEASE ORAL DAILY
Status: DISCONTINUED | OUTPATIENT
Start: 2023-01-26 | End: 2023-01-29

## 2023-01-26 RX ADMIN — NICARDIPINE HYDROCHLORIDE 2 MG/HR: 0.2 INJECTION, SOLUTION INTRAVENOUS at 02:01

## 2023-01-26 RX ADMIN — CLOPIDOGREL BISULFATE 75 MG: 75 TABLET ORAL at 09:01

## 2023-01-26 RX ADMIN — METOPROLOL SUCCINATE 25 MG: 25 TABLET, EXTENDED RELEASE ORAL at 03:01

## 2023-01-26 RX ADMIN — LORAZEPAM 1 MG: 1 TABLET ORAL at 09:01

## 2023-01-26 RX ADMIN — MUPIROCIN 1 G: 20 OINTMENT TOPICAL at 09:01

## 2023-01-26 NOTE — PT/OT/SLP EVAL
Occupational Therapy   Evaluation    Name: Manpreet Josue  MRN: [id number]  Admitting Diagnosis: CVA (cerebral vascular accident)  Recent Surgery: * No surgery found *      Recommendations:     Discharge Recommendations: rehabilitation facility  Discharge Equipment Recommendations:     Barriers to discharge:       Assessment:     Manpreet Josue is a 52 y.o. male with a medical diagnosis of CVA (cerebral vascular accident).  He presents with performance deficits affecting function: weakness, impaired endurance, impaired self care skills, impaired functional mobility, gait instability, impaired balance, decreased coordination.    Pt has sustained an acute CVA with R sided weakness in UE and LE and noted decreased ability to participate in ADLs due to dominate side hemiparesis. Pt with full AROM to LUE with strength grossly 5/5. Pt with good shoulder elevation, protraction and retraction on Left side in gravity eliminated plane. Noted trace mm activation with improvements with NDT tapping and WB ac this session from shoulder>distally. Pt was able to safely feed self with LUE. Pt noted with decreased balance and ability to t/f self at this time, however is extremely motivated to therapy. 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 OT educated pt and wife on AAROM ex to RUE with use of left as well as proper positioning to RUE to prevent subluxation. Pt would benefit from continued services at an inpatient facility.     Rehab Prognosis: Good; patient would benefit from acute skilled OT services to address these deficits and reach maximum level of function.       Plan:     Patient to be seen 5 x/week to address the above listed problems via self-care/home management, therapeutic activities, therapeutic exercises, neuromuscular re-education  Plan of Care Expires:    Plan of Care Reviewed with: patient, spouse    Subjective     Chief Complaint: R hemiparesis  Patient/Family Comments/goals: to gain  as much function as possible    Occupational Profile:  Living Environment: lives with wife and two older children. Has and 9y/o son who lives elsewhere  Previous level of function: (I); still working  Roles and Routines: very active; still working  Equipment Used at Home: none  Assistance upon Discharge: TBD    Pain/Comfort:  Pain Rating 1: 0/10    Patients cultural, spiritual, Rastafari conflicts given the current situation:      Objective:     Communicated with: nursing prior to session.  Patient found up in chair with   upon OT entry to room.    General Precautions: Standard,    Orthopedic Precautions:    Braces:    Respiratory Status: Room air    Occupational Performance:    Activities of Daily Living:  Feeding:  stand by assistance once setup    Physical Exam:  Dominant hand:    -       R hand  Upper Extremity Strength:    -       Right Upper Extremity: Deficits: 2-/5  -       Left Upper Extremity: WFL    AMPAC 6 Click ADL:  AMPAC Total Score: 14    Treatment & Education:  See above    Patient left up in chair with all lines intact and call button in reach    GOALS:   Multidisciplinary Problems       Occupational Therapy Goals          Problem: Occupational Therapy    Goal Priority Disciplines Outcome Interventions   Occupational Therapy Goal     OT, PT/OT Ongoing, Progressing    Description: Goals to be met by: 2/16/23     Patient will increase functional independence with ADLs by performing:    Feeding with Set-up Assistance.  Grooming while seated with Set-up Assistance and use of adaptive techniques  Sitting at edge of bed x20 minutes with Contact Guard Assistance.  Increased functional strength to 4/5 for increased (I) with ADLs with LUE; RUE AROM to increase as able.                         History:     No past medical history on file.    No past surgical history on file.    Time Tracking:     OT Date of Treatment: 01/26/23  OT Start Time: 1200  OT Stop Time: 1245  OT Total Time (min): 45 min    Billable  Minutes:Evaluation 45    1/26/2023

## 2023-01-26 NOTE — PT/OT/SLP EVAL
Physical Therapy Evaluation    Patient Name:  Manpreet Josue   MRN:  [id number]    Recommendations:     Discharge Recommendations: rehabilitation facility   Discharge Equipment Recommendations: walker, rolling   Barriers to discharge: None    Assessment:     Manpreet Josue is a 52 y.o. male admitted with a medical diagnosis of <principal problem not specified>.  He presents with the following impairments/functional limitations: weakness, impaired endurance, impaired functional mobility, gait instability, impaired balance, decreased lower extremity function, decreased upper extremity function, decreased safety awareness, impaired self care skills, impaired coordination, decreased ROM, impaired sensation .    Pt with acute CVA affecting R side. 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 Upon assessment facial drooping on the R, noted weakness throughout RUE and RLE and impaired sensation mostly at distal RUE and RLE was observed. With assistance, he was able to stand to a RW, noted lean and LOB noted to the R. He required assistance and facilitation to keep R hand on the walker due to impaired  strength. With maximal verbal and tactile cues, he was able to take 3 steps to the L with Max A for balance. He required redirection to center with his balance and he fatigued quickly. He is not safe to return home, as he is at great risk for falls. He has great potential to improve, as his strength and use of the R side seemed to improve slightly after one therapy session. He would greatly benefit from further skilled therapy to address all impairments and to return to PLOF.     Rehab Prognosis: Good; patient would benefit from acute skilled PT services to address these deficits and reach maximum level of function.    Recent Surgery: * No surgery found *      Plan:     During this hospitalization, patient to be seen daily to address the identified rehab impairments via gait training, therapeutic  activities, therapeutic exercises and progress toward the following goals:    Plan of Care Expires:  23    Subjective     Chief Complaint: weakness of R side  Patient/Family Comments/goals: to regain function  Pain/Comfort:  Pain Rating 1: 0/10    Patients cultural, spiritual, Restorationism conflicts given the current situation:      Living Environment:  Pt from home, lives with wife, works  Prior to admission, patients level of function was Independent.  Equipment used at home: none.      Objective:     Communicated with nurse prior to session.  Patient found HOB elevated with    upon PT entry to room.    General Precautions: Standard, fall  Orthopedic Precautions:    Braces:    Respiratory Status: Room air    Exams:  RLE Strength: Deficits: 2-/5 grossly  LLE Strength: WFL    Functional Mobility:  Bed Mobility:     Supine to Sit: minimum assistance and moderate assistance  Transfers:     Sit to Stand:  maximal assistance with rolling walker  Bed to Chair: maximal assistance with  rolling walker  using  Step Transfer  Balance: Max A static standing at RW; Sitting EOB SBA       AM-PAC 6 CLICK MOBILITY  Total Score:        Treatment & Education:  See above    Patient left up in chair with all lines intact and call button in reach.    GOALS:   Multidisciplinary Problems       Physical Therapy Goals          Problem: Physical Therapy    Goal Priority Disciplines Outcome Goal Variances Interventions   Physical Therapy Goal     PT, PT/OT Ongoing, Progressing     Description: Goals to be met by: 23     Patient will increase functional independence with mobility by performin. Supine to sit with Stand-by Assistance  2. Sit to stand transfer with Stand-by Assistance  3. Gait  x 50 feet with Contact Guard Assistance using Rolling Walker.                          History:     No past medical history on file.    No past surgical history on file.    Time Tracking:     PT Received On: 23  PT Start Time: 921      PT Stop Time: 0951  PT Total Time (min): 30 min     Billable Minutes: Evaluation 30 01/26/2023

## 2023-01-26 NOTE — PLAN OF CARE
Problem: Adult Inpatient Plan of Care  Goal: Plan of Care Review  Outcome: Ongoing, Progressing  Goal: Patient-Specific Goal (Individualized)  Outcome: Ongoing, Progressing  Goal: Absence of Hospital-Acquired Illness or Injury  Outcome: Ongoing, Progressing  Goal: Optimal Comfort and Wellbeing  Outcome: Ongoing, Progressing  Goal: Readiness for Transition of Care  Outcome: Ongoing, Progressing     Problem: Balance Impairment (Functional Deficit)  Goal: Improved Balance and Postural Control  Outcome: Ongoing, Progressing     Problem: Adjustment to Illness (Stroke, Ischemic/Transient Ischemic Attack)  Goal: Optimal Coping  Outcome: Ongoing, Progressing     Problem: Cerebral Tissue Perfusion (Stroke, Ischemic/Transient Ischemic Attack)  Goal: Optimal Cerebral Tissue Perfusion  Outcome: Ongoing, Progressing     Problem: Communication Impairment (Stroke, Ischemic/Transient Ischemic Attack)  Goal: Improved Communication Skills  Outcome: Ongoing, Progressing     Problem: Functional Ability Impaired (Stroke, Ischemic/Transient Ischemic Attack)  Goal: Optimal Functional Ability  Outcome: Ongoing, Progressing     Problem: Skin Injury Risk Increased  Goal: Skin Health and Integrity  Outcome: Ongoing, Progressing     Problem: Balance Impairment (Functional Deficit)  Goal: Improved Balance and Postural Control  Outcome: Ongoing, Progressing     Problem: Muscle Strength Impairment (Functional Deficit)  Goal: Improved Muscle Strength  Outcome: Ongoing, Progressing     Problem: Balance Impairment (Functional Deficit)  Goal: Improved Balance and Postural Control  Outcome: Ongoing, Progressing     Problem: Coordination Impairment (Functional Deficit)  Goal: Optimal Coordination  Outcome: Ongoing, Progressing     Problem: Muscle Strength Impairment (Functional Deficit)  Goal: Improved Muscle Strength  Outcome: Ongoing, Progressing     Problem: Muscle Tone Impairment (Functional Deficit)  Goal: Improved Muscle Tone  Outcome: Ongoing,  Progressing     Problem: Range of Motion Impairment (Functional Deficit)  Goal: Optimal Range of Motion  Outcome: Ongoing, Progressing

## 2023-01-26 NOTE — HPI
52 year old male with non significant medical history who presented to ED accompanied by his wife with complaints of worsening symptoms of slurred speech, right facial droop and right sided weakness. They admitted last known normal was Monday night prior to going to bed and noted symptoms on Tuesday but did not present for evaluation. He denies any positive sick contact. Denies fever, chills, N/V/D

## 2023-01-26 NOTE — PT/OT/SLP EVAL
"Speech Language Pathology Evaluation  Bedside Swallow    Patient Name:  Manpreet Josue   MRN:  [id number]  Admitting Diagnosis: <principal problem not specified>    Recommendations:                  General Recommendations:  Speech/language therapy  Diet recommendations:  Soft & Bite Sized Diet - IDDSI Level 6, Thin liquids - IDDSI Level 0   Aspiration Precautions: 1 bite/sip at a time, Assistance with meals, Check for pocketing/oral residue, Feed only when awake/alert, HOB to 90 degrees, Meds whole 1 at a time, Remain upright 30 minutes post meal, Small bites/sips, and Standard aspiration precautions   General Precautions: Standard,    Communication strategies:   Decrease environmental noises, watch Pt speak, and cue Pt to increase volume.    History:     No past medical history on file.    No past surgical history on file.    Social History: Patient lives with wife. Was IND and working.        Subjective     Clinical assessment of swallow function and speech-language skills.     Respiratory Status: Room air    Objective:     Cognitive Status:    Arousal/Alertness Appropriate response to stimuli  Attention No obvious deficits observed    Memory long term recall WFL       Receptive Language:   Comprehension:      WFL    Pragmatics:    turn taking WFL and Eye contact WFL    Expressive Language:  Verbal:    Automatic Speech  Counting WFL, Days of the week WFL, and Phrase completion WFL  Sentence formulation WFL  Nonverbal:   Mouth        Motor Speech:  Dysarthria mild-mod  Resonance mild-mod    Voice:   Low volume, but Pt reported that is his PLOF    Reading:   WFL     Written Expression:   Dominant hand: Right which is affected      ST did administer "MS Aphasia Screening Test" due to L side CVA. Expressive subscale was 40/50 (-10 due to inability to write) and Receptive subscale at 48/50 (1 error on y/n accuracy).   WFL speech, language, cognition, but presented mild-mod motor speech deficits.     Oral Musculature " Evaluation  Oral Musculature: right weakness  Dentition: present and adequate  Oral Labial Strength and Mobility: impaired retraction, impaired seal  Lingual Strength and Mobility: impaired right lateral movement    Bedside Swallow Eval:   Consistencies Assessed:  Thin liquids    Puree    Mixed consistencies    Soft solids    Solids        Oral Phase:   Anterior loss w/cup sips.  None w/straw  No oral residue or pocketing noted w/PO intake.     Pharyngeal Phase:   no overt clinical signs/symptoms of aspiration  no overt clinical signs/symptoms of pharyngeal dysphagia    Compensatory Strategies  None      Assessment:     Manpreet Josue is a 52 y.o. male with an SLP diagnosis of  Dysarthria .  He presents with poor R side oral weakness and coordination. Pt also presents low volume. If ST was watching Pt speak then ST was able to comprehend Pt's speech. If ST looked away, Pt's speech ~70% accuracy.     Goals:   Multidisciplinary Problems       SLP Goals       Pt's oral motor strength and ROM will increase as noted in ROM exercises and speech exercises on R side.   Pt's speech will increase to 90% w/o the visual of Pt's articulators and w/environmental noises.                    Plan:     Patient to be seen:  3 x/week (M-F)   Plan of Care expires:  02/24/23  Plan of Care reviewed with:  patient   SLP Follow-Up:  Yes           Time Tracking:       Billable Minutes: Eval 55  and Eval Swallow and Oral Function 55    01/26/2023

## 2023-01-26 NOTE — PLAN OF CARE
Met w/wife in waiting room.  Discussed d/c plans.  Pt has no insurance and per financial Paulette here at hospital, he does NOT qualify for Medicaid.    Wife is crying, very concerned about the future.  I comforted her and explained to her that I can try to get him to [facility] in [facility], and she stated that this is too far that she can't drive back and forth.  I told her that we can revisit this option later.  I told her that this is a traumatic situation and that she needs to settle down and give herself a couple of days and that we will talk again.  I told her that the best thing she can do right now is to go and apply for his disability because this takes a while.  She stated that she will go today to get the disability info today in [place].   Also informed her that she may need to consider placing pt in a NH and paying cash for a couple months until she learns how to care for him and she stated that she can't afford that and I told her that she may consider taking a loan if she has to.    Will follow up.

## 2023-01-26 NOTE — H&P
[facility] - ICU  History & Physical    Subjective:      Chief Complaint/Reason for Admission:     52 year old male with non significant medical history who presented to ED accompanied by his wife with complaints of worsening symptoms of slurred speech, right facial droop and right sided weakness. They admitted last known normal was Monday night prior to going to bed and noted symptoms on Tuesday but did not present for evaluation. He denies any positive sick contact. Denies fever, chills, N/V/D.       PMH: Obesity  PSH: Denies  Social Hx: Denies smoking or Etoh       No past medical history on file.  No past surgical history on file.  No family history on file.       No medications prior to admission.     Review of patient's allergies indicates:  No Known Allergies     Review of Systems   Constitutional: Negative.    HENT: Negative.          Right facial droop   Cardiovascular: Negative.    Gastrointestinal: Negative.    Neurological:  Positive for speech change and weakness.   Endo/Heme/Allergies: Negative.    Psychiatric/Behavioral: Negative.       Objective:      Vital Signs (Most Recent)  Temp: 98.4 °F (36.9 °C) (01/25/23 2104)  Pulse: 79 (01/25/23 2100)  Resp: 20 (01/25/23 2100)  BP: (!) 160/83 (01/25/23 2100)  SpO2: (!) 91 % (01/25/23 2100)    Vital Signs Range (Last 24H):  Temp:  [98 °F (36.7 °C)-98.4 °F (36.9 °C)]   Pulse:  []   Resp:  [12-28]   BP: (151-215)/()   SpO2:  [91 %-99 %]     Physical Exam  Constitutional:       Appearance: Normal appearance. He is obese.   HENT:      Head: Normocephalic and atraumatic.      Comments: Right facial droop  Eyes:      Extraocular Movements: Extraocular movements intact.      Pupils: Pupils are equal, round, and reactive to light.   Cardiovascular:      Rate and Rhythm: Normal rate and regular rhythm.      Pulses: Normal pulses.      Heart sounds: Normal heart sounds.   Pulmonary:      Effort: Pulmonary effort is normal.      Breath sounds:  Normal breath sounds.   Abdominal:      General: Abdomen is flat.      Palpations: Abdomen is soft.   Skin:     General: Skin is warm and dry.      Capillary Refill: Capillary refill takes less than 2 seconds.   Neurological:      Mental Status: He is alert.      Motor: Weakness present.      Comments: Right upper/lower extremity weakness   Psychiatric:         Judgment: Judgment normal.       Data Review:  CBC:   Lab Results   Component Value Date    WBC 7.8 01/25/2023    RBC 4.82 01/25/2023    HGB 14.2 01/25/2023    HCT 44.4 01/25/2023     01/25/2023     BMP:   Lab Results   Component Value Date     01/25/2023    K 4.4 01/25/2023    CO2 24 01/25/2023    BUN 13.0 01/25/2023    CREATININE 0.92 01/25/2023    CALCIUM 9.2 01/25/2023     Coagulation:   Lab Results   Component Value Date    INR 1.10 01/25/2023     Cardiac markers: No results found for: CKMB, TROPONINT, MYOGLOBIN  ABGs: No results found for: PH, PO2, PCO2    MRI Brain: Acute Ischemia measuring 2 x 1 cm along left corona radiata/superior left thalamus    Carotid US Bilateral: No significant atherosclerotic plaque formation or stenosis.    ECG: normal sinus rhythm, no blocks or conduction defects, no ischemic changes.     Assessment:      Active Hospital Problems    Diagnosis  POA    Primary hypertension [I10]  Yes    CVA (cerebral vascular accident) [I63.9]  Yes      Resolved Hospital Problems   No resolved problems to display.       Plan:    Pt presented with stroke symptoms and confirmed acute CVA on MRI  Last known well on Monday; outside tPA window  US carotid B: No significant stenosis  Seen by Tele-Neurologist  Recommended dual antiplatelet therapy (Aspirin, Plavix)  Statin  Added PT/OT/ST  Permissive HTN range from -180 per Neurology recommendations   Neuro Checks  VTE ppx        Code: FULL      All diagnosis and differential diagnosis have been reviewed; assessment and plan has been documented; I have personally reviewed the  labs and test results that are presently available; I have reviewed the patients medication list; I have reviewed the consulting providers response and recommendations. I have reviewed or attempted to review medical records based upon their availability         Service was provided using HIPAA compliant web platform using SOC for audio/visual equipment.  Patient location: Hospital  Provider Location: [place], [place]  Participants on call: Bedside RN, Patient  Consent was obtained and the patient was seen with nurse assiting from the bedside.

## 2023-01-26 NOTE — PROGRESS NOTES
[facility] - ICU Hospital Medicine  Progress Note    Patient Name: Manpreet Josue  MRN: [id number]  Patient Class: IP- Inpatient   Admission Date: 1/25/2023  Length of Stay: 1 days  Attending Physician: Woodrow Darby MD  Primary Care Provider: Primary Doctor No        Subjective:     Principal Problem:CVA (cerebral vascular accident)        HPI:  52 year old male with non significant medical history who presented to ED accompanied by his wife with complaints of worsening symptoms of slurred speech, right facial droop and right sided weakness. They admitted last known normal was Monday night prior to going to bed and noted symptoms on Tuesday but did not present for evaluation. He denies any positive sick contact. Denies fever, chills, N/V/D      Overview/Hospital Course:  1/26/23-The patient is still on a Cardene drip.  Will start titrating meds tomorrow which would have allowed for the permissive HTN.  He still has deficit.  Cardiology also saw him for his CHF.  Will need CM involvement due to lack of insurance for the patient.  I did have a lengthy discussion with the wife.      Interval History:     Review of Systems   Constitutional:  Positive for activity change and fatigue.   HENT: Negative.     Eyes: Negative.    Respiratory: Negative.     Cardiovascular: Negative.    Gastrointestinal: Negative.    Endocrine: Negative.    Genitourinary: Negative.    Musculoskeletal:  Positive for gait problem.   Skin: Negative.    Allergic/Immunologic: Negative.    Neurological:  Positive for facial asymmetry, speech difficulty and weakness.   Hematological: Negative.    Psychiatric/Behavioral: Negative.     Objective:     Vital Signs (Most Recent):  Temp: 97.7 °F (36.5 °C) (01/26/23 1115)  Pulse: 79 (01/26/23 1538)  Resp: 17 (01/26/23 1145)  BP: (!) 174/95 (01/26/23 1538)  SpO2: 98 % (01/26/23 1145)   Vital Signs (24h Range):  Temp:  [97.5 °F (36.4 °C)-98.4 °F (36.9 °C)] 97.7 °F (36.5 °C)  Pulse:  []  79  Resp:  [7-28] 17  SpO2:  [90 %-98 %] 98 %  BP: (129-215)/() 174/95     Weight: 114.3 kg (251 lb 15.8 oz)  Body mass index is 37.21 kg/m².    Intake/Output Summary (Last 24 hours) at 1/26/2023 1639  Last data filed at 1/26/2023 1200  Gross per 24 hour   Intake 519.25 ml   Output 1400 ml   Net -880.75 ml      Physical Exam  Constitutional:       Appearance: Normal appearance. He is obese.   HENT:      Head: Normocephalic and atraumatic.      Nose: Nose normal.      Mouth/Throat:      Mouth: Mucous membranes are moist.      Pharynx: Oropharynx is clear.   Eyes:      Extraocular Movements: Extraocular movements intact.      Conjunctiva/sclera: Conjunctivae normal.      Pupils: Pupils are equal, round, and reactive to light.   Cardiovascular:      Rate and Rhythm: Normal rate and regular rhythm.      Pulses: Normal pulses.      Heart sounds: Normal heart sounds.   Pulmonary:      Effort: Pulmonary effort is normal.      Breath sounds: Normal breath sounds.   Abdominal:      General: Bowel sounds are normal.      Palpations: Abdomen is soft.   Musculoskeletal:         General: Normal range of motion.      Cervical back: Normal range of motion and neck supple.   Skin:     General: Skin is warm and dry.      Capillary Refill: Capillary refill takes 2 to 3 seconds.   Neurological:      Mental Status: He is alert and oriented to person, place, and time.      Motor: Weakness present.      Coordination: Coordination abnormal.      Gait: Gait abnormal.   Psychiatric:         Mood and Affect: Mood normal.         Behavior: Behavior normal.         Thought Content: Thought content normal.         Judgment: Judgment normal.       Significant Labs: All pertinent labs within the past 24 hours have been reviewed.  BMP:   Recent Labs   Lab 01/25/23  0748      K 4.4   CO2 24   BUN 13.0   CREATININE 0.92   CALCIUM 9.2     CBC:   Recent Labs   Lab 01/25/23  0743   WBC 7.8   HGB 14.2   HCT 44.4        CMP:   Recent  Labs   Lab 01/25/23  0748      K 4.4   CO2 24   BUN 13.0   CREATININE 0.92   CALCIUM 9.2   ALBUMIN 3.9   BILITOT 1.4   ALKPHOS 108   AST 20   ALT 30     Magnesium: No results for input(s): MG in the last 48 hours.    Significant Imaging: I have reviewed all pertinent imaging results/findings within the past 24 hours.      Assessment/Plan:      * CVA (cerebral vascular accident)  follow labs  Therapy  Still allowing permissive HTN until tomorrow  cardene drip  DVt prophylaxis    Primary hypertension  cardene drip  Will start titrating meds tomorrow        VTE Risk Mitigation (From admission, onward)         Ordered     IP VTE HIGH RISK PATIENT  Once         01/25/23 1649     Place sequential compression device  Until discontinued         01/25/23 1649            wean cardene drip  Follow labs  Therapy  CM for discharge planning  Cardiology consulted for CHF  Telemetry    Critical care time=45mins    Discharge Planning   FAIZA:      Code Status: Full Code   Is the patient medically ready for discharge?:     Reason for patient still in hospital (select all that apply): Laboratory test, Treatment, Consult recommendations and PT / OT recommendations  Discharge Plan A: Rehab                  Woodrow Walls MD  Department of Hospital Medicine   [facility] - [facility]

## 2023-01-26 NOTE — SUBJECTIVE & OBJECTIVE
Interval History:     Review of Systems   Constitutional:  Positive for activity change and fatigue.   HENT: Negative.     Eyes: Negative.    Respiratory: Negative.     Cardiovascular: Negative.    Gastrointestinal: Negative.    Endocrine: Negative.    Genitourinary: Negative.    Musculoskeletal:  Positive for gait problem.   Skin: Negative.    Allergic/Immunologic: Negative.    Neurological:  Positive for facial asymmetry, speech difficulty and weakness.   Hematological: Negative.    Psychiatric/Behavioral: Negative.     Objective:     Vital Signs (Most Recent):  Temp: 97.7 °F (36.5 °C) (01/26/23 1115)  Pulse: 79 (01/26/23 1538)  Resp: 17 (01/26/23 1145)  BP: (!) 174/95 (01/26/23 1538)  SpO2: 98 % (01/26/23 1145)   Vital Signs (24h Range):  Temp:  [97.5 °F (36.4 °C)-98.4 °F (36.9 °C)] 97.7 °F (36.5 °C)  Pulse:  [] 79  Resp:  [7-28] 17  SpO2:  [90 %-98 %] 98 %  BP: (129-215)/() 174/95     Weight: 114.3 kg (251 lb 15.8 oz)  Body mass index is 37.21 kg/m².    Intake/Output Summary (Last 24 hours) at 1/26/2023 1639  Last data filed at 1/26/2023 1200  Gross per 24 hour   Intake 519.25 ml   Output 1400 ml   Net -880.75 ml      Physical Exam  Constitutional:       Appearance: Normal appearance. He is obese.   HENT:      Head: Normocephalic and atraumatic.      Nose: Nose normal.      Mouth/Throat:      Mouth: Mucous membranes are moist.      Pharynx: Oropharynx is clear.   Eyes:      Extraocular Movements: Extraocular movements intact.      Conjunctiva/sclera: Conjunctivae normal.      Pupils: Pupils are equal, round, and reactive to light.   Cardiovascular:      Rate and Rhythm: Normal rate and regular rhythm.      Pulses: Normal pulses.      Heart sounds: Normal heart sounds.   Pulmonary:      Effort: Pulmonary effort is normal.      Breath sounds: Normal breath sounds.   Abdominal:      General: Bowel sounds are normal.      Palpations: Abdomen is soft.   Musculoskeletal:         General: Normal range of  motion.      Cervical back: Normal range of motion and neck supple.   Skin:     General: Skin is warm and dry.      Capillary Refill: Capillary refill takes 2 to 3 seconds.   Neurological:      Mental Status: He is alert and oriented to person, place, and time.      Motor: Weakness present.      Coordination: Coordination abnormal.      Gait: Gait abnormal.   Psychiatric:         Mood and Affect: Mood normal.         Behavior: Behavior normal.         Thought Content: Thought content normal.         Judgment: Judgment normal.       Significant Labs: All pertinent labs within the past 24 hours have been reviewed.  BMP:   Recent Labs   Lab 01/25/23  0748      K 4.4   CO2 24   BUN 13.0   CREATININE 0.92   CALCIUM 9.2     CBC:   Recent Labs   Lab 01/25/23  0743   WBC 7.8   HGB 14.2   HCT 44.4        CMP:   Recent Labs   Lab 01/25/23  0748      K 4.4   CO2 24   BUN 13.0   CREATININE 0.92   CALCIUM 9.2   ALBUMIN 3.9   BILITOT 1.4   ALKPHOS 108   AST 20   ALT 30     Magnesium: No results for input(s): MG in the last 48 hours.    Significant Imaging: I have reviewed all pertinent imaging results/findings within the past 24 hours.

## 2023-01-26 NOTE — HOSPITAL COURSE
1/26/23-The patient is still on a Cardene drip.  Will start titrating meds tomorrow which would have allowed for the permissive HTN.  He still has deficit.  Cardiology also saw him for his CHF.  Will need CM involvement due to lack of insurance for the patient.  I did have a lengthy discussion with the wife.    1/27/23-Patient is doing well at this time.  He is still on a Cardene drip.  Will start adding additional oral meds to help with his BP.  Therapy is still working with him as well.    1/28/23-Patient is resting at this time.  His BP is much improved.  Will try top wean off the Cardene drip today.  Once he is stable off the drip then he can move to the floor.  Will also start a sliding scale because his HBA1C is 8.7.  Hopefully he can control diet with diet which will also be changed.    1/29  - Awake, alert, wife present. Updated on condition and plan. All questions answered. Off Cardene gtt this 2 am. BP still running a little high. Increase Toprol XL to 50mg daily. Cont ASA/Plavix. Add Metformin for DM2. Downgrade to floor.    1/30  - No complaints. Feels well. Home soon.    1/31  - Feels well. No acute events.     2/1  - Cont with Right paresis. PT to teach wife how to take care of pt at home and DME recs.    2/2  - No new complaints. Home soon.    2/3  - No new complaints. Pt now has Medicaid. Rehab referral.    2/4  - Awaiting rehab. No complaints.    2/5  - Feels well. No new complaints or events. Rehab pending.    2/6  - No changes or new complaints. Awaiting rehab placement.

## 2023-01-26 NOTE — PLAN OF CARE
Problem: Adult Inpatient Plan of Care  Goal: Plan of Care Review  Outcome: Ongoing, Progressing  Goal: Patient-Specific Goal (Individualized)  Outcome: Ongoing, Progressing  Goal: Absence of Hospital-Acquired Illness or Injury  Outcome: Ongoing, Progressing  Goal: Optimal Comfort and Wellbeing  Outcome: Ongoing, Progressing  Goal: Readiness for Transition of Care  Outcome: Ongoing, Progressing     Problem: Balance Impairment (Functional Deficit)  Goal: Improved Balance and Postural Control  Outcome: Ongoing, Progressing     Problem: Adjustment to Illness (Stroke, Ischemic/Transient Ischemic Attack)  Goal: Optimal Coping  Outcome: Ongoing, Progressing     Problem: Cerebral Tissue Perfusion (Stroke, Ischemic/Transient Ischemic Attack)  Goal: Optimal Cerebral Tissue Perfusion  Outcome: Ongoing, Progressing     Problem: Communication Impairment (Stroke, Ischemic/Transient Ischemic Attack)  Goal: Improved Communication Skills  Outcome: Ongoing, Progressing     Problem: Functional Ability Impaired (Stroke, Ischemic/Transient Ischemic Attack)  Goal: Optimal Functional Ability  Outcome: Ongoing, Progressing

## 2023-01-26 NOTE — CONSULTS
[facility] - ICU  Cardiology  Consult Note    Patient Name: Manpreet Josue  MRN: [id number]  Admission Date: 1/25/2023  Hospital Length of Stay: 1 days  Code Status: Full Code   Attending Provider: Woodrow Darby MD   Consulting Provider: GENE Duke  Primary Care Physician: Primary Doctor No  Principal Problem:<principal problem not specified>    Patient information was obtained from patient, ER records, and primary team.     Inpatient consult to Cardiology  Consult performed by: GENE Duke  Consult ordered by: Alexey Paniagua MD  Reason for consult: CMO      Subjective:     Chief Complaint: Right sided weakness     HPI: Patient is a 53 yo male unknown to our services.  His symptoms started yesterday morning with some disorientation and difficulty speaking.  He also had some weakness in his right hand and right leg.  He decided that he should come into the ER for an evaluation.  Upon arrival systolic blood pressure was 220/125, EKG was done which showed a normal sinus rhythm and some lateral T-wave inversions.  Lab work showed a minimally elevated troponin that tapered down with the 2nd set.  Initial CT scan was negative.  Chest x-ray was consistent with some vascular congestion.  Carotid ultrasound was negative.  MRI did show left thalamic infarct.  He was seen by vascular Neurology who loaded him with Plavix aspirin and high-intensity statin.      1/26/23: Patient sitting up in chair eating lunch without distress. He denies any CP or SOB. He remains on cardene gtt for BP.       No current facility-administered medications on file prior to encounter.     No current outpatient medications on file prior to encounter.     Tobacco Use    Smoking status: Not on file    Smokeless tobacco: Not on file   Substance and Sexual Activity    Alcohol use: Not on file    Drug use: Not on file    Sexual activity: Not on file     Review of Systems   Constitutional: Negative.   Cardiovascular:   Positive for dyspnea on exertion.   Respiratory: Negative.     Skin: Negative.    Musculoskeletal:  Positive for muscle weakness.   Gastrointestinal: Negative.    Neurological:         R UE and L weakness, facial droop    Psychiatric/Behavioral: Negative.     Allergic/Immunologic: Negative.    Objective:     Vital Signs (Most Recent):  Temp: 97.7 °F (36.5 °C) (01/26/23 1115)  Pulse: 90 (01/26/23 1145)  Resp: 17 (01/26/23 1145)  BP: (!) 156/91 (01/26/23 1130)  SpO2: 98 % (01/26/23 1145)   Vital Signs (24h Range):  Temp:  [97.5 °F (36.4 °C)-98.4 °F (36.9 °C)] 97.7 °F (36.5 °C)  Pulse:  [] 90  Resp:  [7-28] 17  SpO2:  [90 %-98 %] 98 %  BP: (129-215)/() 156/91     Weight: 114.3 kg (251 lb 15.8 oz)  Body mass index is 37.21 kg/m².    SpO2: 98 %         Intake/Output Summary (Last 24 hours) at 1/26/2023 1400  Last data filed at 1/26/2023 0609  Gross per 24 hour   Intake 59.25 ml   Output 1400 ml   Net -1340.75 ml       Lines/Drains/Airways       Drain  Duration             Male External Urinary Catheter 01/26/23 0230 Medium <1 day              Peripheral Intravenous Line  Duration                  Peripheral IV - Single Lumen 01/26/23 0215 20 G Anterior;Distal;Right Upper Arm <1 day         Peripheral IV - Single Lumen 01/26/23 0215 20 G Left;Posterior Hand <1 day                    Physical Exam  Constitutional:       Appearance: Normal appearance. He is obese.   Eyes:      Extraocular Movements: Extraocular movements intact.   Cardiovascular:      Rate and Rhythm: Normal rate and regular rhythm.   Pulmonary:      Effort: Pulmonary effort is normal.      Breath sounds: Normal breath sounds.   Musculoskeletal:      Comments: R sided weakness    Skin:     General: Skin is warm and dry.   Neurological:      Mental Status: He is alert.      Motor: Weakness present.   Psychiatric:         Mood and Affect: Mood normal.         Behavior: Behavior normal.       Significant Imaging:   Echocardiogram 1/26/23:  The left  ventricle is mildly enlarged with moderate concentric hypertrophy and severely decreased systolic function.  The estimated ejection fraction is 26%.  Grade III left ventricular diastolic dysfunction.  Mild tricuspid regurgitation.  Mild mitral regurgitation.  Mild right ventricular enlargement with mildly reduced right ventricular systolic function.  Moderate left atrial enlargement.  Mild right atrial enlargement.  Elevated central venous pressure (15 mmHg).  The estimated PA systolic pressure is 47 mmHg.  There is pulmonary hypertension.    Assessment and Plan:       1. Acute CVA w/ right sided weakness     - Continue aspirin and plavix     - Monitor Tele     - Continue therapy  2. Cardiomyopathy     - Start Toprol 25 mg daily      - Will get LifeVest evaluation     - Ischemic evaluation as outpatient  3. HTN Urgency      - Allowing permissive HTN     - Currently on Cardene gtt  4. Elevated Troponin     - Patient denies CP and trend has remained flat     - Type II MI s/t CVA and hypertensive urgency       Leeroy Combs, GENE  Cardiology   [facility] - Emergency Dept

## 2023-01-27 PROBLEM — I50.41 ACUTE COMBINED SYSTOLIC AND DIASTOLIC CONGESTIVE HEART FAILURE: Status: ACTIVE | Noted: 2023-01-27

## 2023-01-27 PROCEDURE — 92507 TX SP LANG VOICE COMM INDIV: CPT

## 2023-01-27 PROCEDURE — 25000003 PHARM REV CODE 250: Performed by: INTERNAL MEDICINE

## 2023-01-27 PROCEDURE — 20000000 HC ICU ROOM

## 2023-01-27 PROCEDURE — 97112 NEUROMUSCULAR REEDUCATION: CPT

## 2023-01-27 PROCEDURE — 97530 THERAPEUTIC ACTIVITIES: CPT

## 2023-01-27 PROCEDURE — 25000003 PHARM REV CODE 250: Performed by: NURSE PRACTITIONER

## 2023-01-27 PROCEDURE — 94761 N-INVAS EAR/PLS OXIMETRY MLT: CPT

## 2023-01-27 PROCEDURE — 97535 SELF CARE MNGMENT TRAINING: CPT

## 2023-01-27 RX ORDER — LISINOPRIL 20 MG/1
20 TABLET ORAL DAILY
Status: DISCONTINUED | OUTPATIENT
Start: 2023-01-27 | End: 2023-01-30

## 2023-01-27 RX ADMIN — LISINOPRIL 20 MG: 20 TABLET ORAL at 12:01

## 2023-01-27 RX ADMIN — MUPIROCIN 1 G: 20 OINTMENT TOPICAL at 08:01

## 2023-01-27 RX ADMIN — METOPROLOL SUCCINATE 25 MG: 25 TABLET, EXTENDED RELEASE ORAL at 08:01

## 2023-01-27 RX ADMIN — CLOPIDOGREL BISULFATE 75 MG: 75 TABLET ORAL at 08:01

## 2023-01-27 RX ADMIN — NICARDIPINE HYDROCHLORIDE 2 MG/HR: 0.2 INJECTION, SOLUTION INTRAVENOUS at 12:01

## 2023-01-27 NOTE — PT/OT/SLP PROGRESS
Physical Therapy Treatment    Patient Name:  Manpreet Josue   MRN:  [id number]    Recommendations:     Discharge Recommendations: rehabilitation facility  Discharge Equipment Recommendations: walker, rolling  Barriers to discharge: None    Assessment:     Manpreet Josue is a 52 y.o. male admitted with a medical diagnosis of CVA (cerebral vascular accident).  He presents with the following impairments/functional limitations: weakness, impaired endurance, impaired functional mobility, gait instability, impaired balance, decreased lower extremity function, decreased upper extremity function, decreased safety awareness, impaired self care skills, impaired coordination, decreased ROM, impaired sensation.    Pt demonstrated decreased muscle strength to RLE today. He was unable to perform any active movement besides slight assistance with ab/adduction. He required max A to stand and max A to transfer to chair. He did do well with balance and was able to achieve and maintain midline with minimal assistance. Encouraged him in self assisted ROM throughout the day.    Rehab Prognosis: Good; patient would benefit from acute skilled PT services to address these deficits and reach maximum level of function.    Recent Surgery: * No surgery found *      Plan:     During this hospitalization, patient to be seen daily to address the identified rehab impairments via gait training, therapeutic activities, therapeutic exercises and progress toward the following goals:    Plan of Care Expires:  02/23/23    Subjective     Chief Complaint: weakness to R  Patient/Family Comments/goals: to be able to get PT before returning home  Pain/Comfort:         Objective:     Communicated with patient prior to session.  Patient found HOB elevated with telemetry, pulse ox (continuous), peripheral IV, PureWick upon PT entry to room.     General Precautions: Standard, fall  Orthopedic Precautions:    Braces:    Respiratory Status: Room air     Functional  Mobility:  Bed Mobility:     Supine to Sit: moderate assistance and maximal assistance  Transfers:     Sit to Stand:  maximal assistance with rolling walker  Bed to Chair: maximal assistance with  no AD  using  Stand Pivot  Balance: min-CGA in sitting, max A in supported standing      AM-PAC 6 CLICK MOBILITY          Treatment & Education:  See above    Patient left up in chair with all lines intact and call button in reach..    GOALS:   Multidisciplinary Problems       Physical Therapy Goals          Problem: Physical Therapy    Goal Priority Disciplines Outcome Goal Variances Interventions   Physical Therapy Goal     PT, PT/OT Ongoing, Progressing     Description: Goals to be met by: 23     Patient will increase functional independence with mobility by performin. Supine to sit with Stand-by Assistance  2. Sit to stand transfer with Stand-by Assistance  3. Gait  x 50 feet with Contact Guard Assistance using Rolling Walker.                          Time Tracking:     PT Received On: 23  PT Start Time: 1020     PT Stop Time: 1040  PT Total Time (min): 20 min     Billable Minutes: Therapeutic Activity 20    Treatment Type: Treatment  PT/PTA: PTA           2023

## 2023-01-27 NOTE — PLAN OF CARE
Rec'd call from [facility]ust[facility] and she stated that they DO NOT TAKE SELF PAY PATIENTS.  She stated to let wife know that she can pay out of pocket and make payment arrangements if she wants to and if so, to give her, her number, # [phone].  Met w/wife this AM and informed her of the above.  She is not interested in out of pocket pay to send him to [facility].

## 2023-01-27 NOTE — ASSESSMENT & PLAN NOTE
Patient is identified as having Combined Systolic and Diastolic heart failure that is Acute. CHF is currently uncontrolled due to >3 pillow orthopnea. Latest ECHO performed and demonstrates- Results for orders placed during the hospital encounter of 01/25/23    Echo Saline Bubble? No    Interpretation Summary  · The left ventricle is mildly enlarged with moderate concentric hypertrophy and severely decreased systolic function.  · The estimated ejection fraction is 26%.  · Grade III left ventricular diastolic dysfunction.  · Mild tricuspid regurgitation.  · Mild mitral regurgitation.  · Mild right ventricular enlargement with mildly reduced right ventricular systolic function.  · Moderate left atrial enlargement.  · Mild right atrial enlargement.  · Elevated central venous pressure (15 mmHg).  · The estimated PA systolic pressure is 47 mmHg.  · There is pulmonary hypertension.  . Continue Beta Blocker and ACE/ARB and monitor clinical status closely. Monitor on telemetry. Patient is on CHF pathway.  Monitor strict Is&Os and daily weights.  Place on fluid restriction of 1 L. Continue to stress to patient importance of self efficacy and  on diet for CHF. Last BNP reviewed- and noted below No results for input(s): BNP, BNPTRIAGEBLO in the last 168 hours..

## 2023-01-27 NOTE — SUBJECTIVE & OBJECTIVE
Interval History:     Review of Systems   Constitutional:  Positive for activity change.   HENT: Negative.     Eyes: Negative.    Respiratory: Negative.     Cardiovascular: Negative.    Gastrointestinal: Negative.    Endocrine: Negative.    Genitourinary: Negative.    Musculoskeletal:  Positive for gait problem.   Skin: Negative.    Allergic/Immunologic: Negative.    Neurological:  Positive for facial asymmetry and weakness.   Hematological: Negative.    Psychiatric/Behavioral: Negative.     Objective:     Vital Signs (Most Recent):  Temp: 97.9 °F (36.6 °C) (01/27/23 1115)  Pulse: 86 (01/27/23 1145)  Resp: 17 (01/27/23 1145)  BP: (!) 163/97 (01/27/23 1130)  SpO2: 98 % (01/27/23 1145)   Vital Signs (24h Range):  Temp:  [97 °F (36.1 °C)-97.9 °F (36.6 °C)] 97.9 °F (36.6 °C)  Pulse:  [60-96] 86  Resp:  [6-23] 17  SpO2:  [92 %-99 %] 98 %  BP: (145-202)/() 163/97     Weight: 114.3 kg (251 lb 15.8 oz)  Body mass index is 37.21 kg/m².    Intake/Output Summary (Last 24 hours) at 1/27/2023 1217  Last data filed at 1/27/2023 0619  Gross per 24 hour   Intake 636.92 ml   Output 1200 ml   Net -563.08 ml      Physical Exam  Constitutional:       Appearance: Normal appearance. He is normal weight.   HENT:      Head: Normocephalic and atraumatic.      Nose: Nose normal.      Mouth/Throat:      Mouth: Mucous membranes are moist.      Pharynx: Oropharynx is clear.   Eyes:      Extraocular Movements: Extraocular movements intact.      Conjunctiva/sclera: Conjunctivae normal.      Pupils: Pupils are equal, round, and reactive to light.   Cardiovascular:      Rate and Rhythm: Normal rate and regular rhythm.      Pulses: Normal pulses.      Heart sounds: Normal heart sounds.   Pulmonary:      Effort: Pulmonary effort is normal.      Breath sounds: Normal breath sounds.   Abdominal:      General: Bowel sounds are normal.      Palpations: Abdomen is soft.   Musculoskeletal:         General: Normal range of motion.      Cervical back:  Normal range of motion and neck supple.   Skin:     General: Skin is warm and dry.      Capillary Refill: Capillary refill takes 2 to 3 seconds.   Neurological:      General: No focal deficit present.      Mental Status: He is alert and oriented to person, place, and time. Mental status is at baseline.   Psychiatric:         Mood and Affect: Mood normal.         Behavior: Behavior normal.         Thought Content: Thought content normal.         Judgment: Judgment normal.       Significant Labs: All pertinent labs within the past 24 hours have been reviewed.  BMP: No results for input(s): GLU, NA, K, CL, CO2, BUN, CREATININE, CALCIUM, MG in the last 48 hours.  CBC: No results for input(s): WBC, HGB, HCT, PLT in the last 48 hours.  CMP: No results for input(s): NA, K, CL, CO2, GLU, BUN, CREATININE, CALCIUM, PROT, ALBUMIN, BILITOT, ALKPHOS, AST, ALT, ANIONGAP, EGFRNONAA in the last 48 hours.    Invalid input(s): ESTGFAFRICA  Magnesium: No results for input(s): MG in the last 48 hours.    Significant Imaging: I have reviewed all pertinent imaging results/findings within the past 24 hours.

## 2023-01-27 NOTE — PROGRESS NOTES
[facility] - ICU  Cardiology  Consult Note    Patient Name: Manpreet Josue  MRN: [id number]  Admission Date: 1/25/2023  Hospital Length of Stay: 2 days  Code Status: Full Code   Attending Provider: Woodrow Darby MD   Consulting Provider: GENE Duke  Primary Care Physician: Primary Doctor No  Principal Problem:CVA (cerebral vascular accident)    Patient information was obtained from patient, ER records, and primary team.       Subjective:     Chief Complaint: Right sided weakness     HPI: Patient is a 53 yo male unknown to our services.  His symptoms started yesterday morning with some disorientation and difficulty speaking.  He also had some weakness in his right hand and right leg.  He decided that he should come into the ER for an evaluation.  Upon arrival systolic blood pressure was 220/125, EKG was done which showed a normal sinus rhythm and some lateral T-wave inversions.  Lab work showed a minimally elevated troponin that tapered down with the 2nd set.  Initial CT scan was negative.  Chest x-ray was consistent with some vascular congestion.  Carotid ultrasound was negative.  MRI did show left thalamic infarct.  He was seen by vascular Neurology who loaded him with Plavix aspirin and high-intensity statin.      1/26/23: Patient sitting up in chair eating lunch without distress. He denies any CP or SOB. He remains on cardene gtt for BP.   1/27/23: Patient in bed without distress. He denies any CP or SOB.      No current facility-administered medications on file prior to encounter.     No current outpatient medications on file prior to encounter.     Tobacco Use    Smoking status: Not on file    Smokeless tobacco: Not on file   Substance and Sexual Activity    Alcohol use: Not on file    Drug use: Not on file    Sexual activity: Not on file     Review of Systems   Constitutional: Negative.   Cardiovascular:  Positive for dyspnea on exertion.   Respiratory: Negative.     Skin: Negative.     Musculoskeletal:  Positive for muscle weakness.   Gastrointestinal: Negative.    Neurological:         R UE and L weakness, facial droop    Psychiatric/Behavioral: Negative.     Allergic/Immunologic: Negative.    Objective:     Vital Signs (Most Recent):  Temp: 97.9 °F (36.6 °C) (01/27/23 1115)  Pulse: 86 (01/27/23 1145)  Resp: 17 (01/27/23 1145)  BP: (!) 163/97 (01/27/23 1130)  SpO2: 98 % (01/27/23 1145)   Vital Signs (24h Range):  Temp:  [97 °F (36.1 °C)-97.9 °F (36.6 °C)] 97.9 °F (36.6 °C)  Pulse:  [60-96] 86  Resp:  [6-23] 17  SpO2:  [92 %-99 %] 98 %  BP: (145-202)/() 163/97     Weight: 114.3 kg (251 lb 15.8 oz)  Body mass index is 37.21 kg/m².    SpO2: 98 %         Intake/Output Summary (Last 24 hours) at 1/27/2023 1201  Last data filed at 1/27/2023 0619  Gross per 24 hour   Intake 636.92 ml   Output 1200 ml   Net -563.08 ml         Lines/Drains/Airways       Drain  Duration             Male External Urinary Catheter 01/26/23 0230 Medium 1 day              Peripheral Intravenous Line  Duration                  Peripheral IV - Single Lumen 01/26/23 0215 20 G Anterior;Distal;Right Upper Arm 1 day         Peripheral IV - Single Lumen 01/26/23 0215 20 G Left;Posterior Hand 1 day                    Physical Exam  Constitutional:       Appearance: Normal appearance. He is obese.   Eyes:      Extraocular Movements: Extraocular movements intact.   Cardiovascular:      Rate and Rhythm: Normal rate and regular rhythm.   Pulmonary:      Effort: Pulmonary effort is normal.      Breath sounds: Normal breath sounds.   Musculoskeletal:      Comments: R sided weakness    Skin:     General: Skin is warm and dry.   Neurological:      Mental Status: He is alert.      Motor: Weakness present.   Psychiatric:         Mood and Affect: Mood normal.         Behavior: Behavior normal.       Significant Imaging:   Echocardiogram 1/26/23:  The left ventricle is mildly enlarged with moderate concentric hypertrophy and severely  decreased systolic function.  The estimated ejection fraction is 26%.  Grade III left ventricular diastolic dysfunction.  Mild tricuspid regurgitation.  Mild mitral regurgitation.  Mild right ventricular enlargement with mildly reduced right ventricular systolic function.  Moderate left atrial enlargement.  Mild right atrial enlargement.  Elevated central venous pressure (15 mmHg).  The estimated PA systolic pressure is 47 mmHg.  There is pulmonary hypertension.    Assessment and Plan:       1. Acute CVA w/ right sided weakness     - Continue aspirin and plavix     - Monitor Tele     - Continue therapy  2. Cardiomyopathy     - Continue Toprol 25 mg daily. ACEi started today (up titrate as able)     - Will get LifeVest evaluation     - Ischemic evaluation as outpatient     - Patient will need to follow up with Cardiology at [facility] after discharge  3. HTN Urgency      - Currently on Cardene gtt     - Primary is starting BP meds  4. Elevated Troponin     - Patient denies CP and trend has remained flat     - Type II MI s/t CVA and hypertensive urgency       Case discussed with Dr Tony Combs, ADALIDP  Cardiology   [facility] - Emergency Dept

## 2023-01-27 NOTE — PT/OT/SLP PROGRESS
Occupational Therapy   Treatment    Name: Manpreet Josue  MRN: [id number]  Admitting Diagnosis:  CVA (cerebral vascular accident)       Recommendations:     Discharge Recommendations: rehabilitation facility  Discharge Equipment Recommendations:     Barriers to discharge:       Assessment:     Manpreet Josue is a 52 y.o. male with a medical diagnosis of CVA (cerebral vascular accident).  He presents with performance deficits affecting function are weakness, impaired endurance, impaired self care skills, impaired functional mobility, impaired balance, decreased coordination, abnormal tone.   Pt and family educated again on self PROM to RUE and encouragement to preform task often. Pt return demo 100% accuracy and good recall of ex from education yesterday. OT worked on RUE normalization of tone as well as ac to increase mm activation. Noted significant decrease in mm activation in all joints of RUE today as compared to yesterday. Little movements in R scapula/shoulder sling for elevation and retraction. Little to no mm activation with elbow or wrist with WB ac, tapping or quick stretch. Pt t/f BTB with maxA and noted good ability to WB through LLE and only minimal assist from OT to stabilize R knee. Pt noted with good midline balance while seated at EOB and completion of LUE AROM ex.     Rehab Prognosis:  Good; patient would benefit from acute skilled OT services to address these deficits and reach maximum level of function.       Plan:     Patient to be seen 3 x/week to address the above listed problems via self-care/home management, therapeutic activities, therapeutic exercises, neuromuscular re-education  Plan of Care Expires:    Plan of Care Reviewed with: patient, spouse, son    Subjective     Pain/Comfort:  Pain Rating 1: 4/10  Location 1: coccyx  Pain Addressed 1: Reposition  Pain Rating Post-Intervention 1: 1/10    Objective:     Communicated with: nursing prior to session.  Patient found up in chair with   upon  OT entry to room.    General Precautions: Standard,      Orthopedic Precautions:   Braces:    Respiratory Status: Room air     Occupational Performance:     Bed Mobility:    Patient completed Rolling/Turning to Left with  moderate assistance  Patient completed Rolling/Turning to Right with moderate assistance  Patient completed Sit to Supine with moderate assistance and maximal assistance    With verbal cues to use adaptive techniques    Functional Mobility/Transfers:  Patient completed Bed <> Chair Transfer using Stand Pivot technique with maximal assistance with hand-held assist    AMPAC 6 Click ADL:      Treatment & Education:  See above    Patient left HOB elevated with all lines intact and call button in reach    GOALS:   Multidisciplinary Problems       Occupational Therapy Goals          Problem: Occupational Therapy    Goal Priority Disciplines Outcome Interventions   Occupational Therapy Goal     OT, PT/OT Ongoing, Progressing    Description: Goals to be met by: 2/16/23     Patient will increase functional independence with ADLs by performing:    Feeding with Set-up Assistance.  Grooming while seated with Set-up Assistance and use of adaptive techniques  Sitting at edge of bed x20 minutes with Contact Guard Assistance.  Increased functional strength to 4/5 for increased (I) with ADLs with LUE; RUE AROM to increase as able.                         Time Tracking:     OT Date of Treatment: 01/27/23  OT Start Time: 1310  OT Stop Time: 1334  OT Total Time (min): 24 min    Billable Minutes:Self Care/Home Management 10  Neuromuscular Re-education 14    OT/SAUNDRA: OT          1/27/2023

## 2023-01-27 NOTE — PROGRESS NOTES
"Inpatient Nutrition Evaluation    Admit Date: 1/25/2023   Total duration of encounter: 2 days    Nutrition Recommendation/Prescription     Rec'd continue Heart Healthy (Bite Size Chopped) diet as tolerated.   Monitor intake, tolerance, weight, and labs.     RD following and available as needed.  Thank you     Nutrition Assessment     Chart Review    Reason Seen: continuous nutrition monitoring    Malnutrition Screening Tool Results   Have you recently lost weight without trying?: No  Have you been eating poorly because of a decreased appetite?: No   MST Score: 0     Diagnosis:  CVA.     Relevant Medical History:     Nutrition-Related Medications:   None at this time.     Nutrition-Related Labs:  1/27: WNL  1/25: (H)    Diet Order: Diet heart healthy (bite size chopped)  Oral Supplement Order: none  Appetite/Oral Intake: good/% of meals  Factors Affecting Nutritional Intake:  CVA. On Bite-Size Diet.   Food/Taoism/Cultural Preferences: none reported  Food Allergies: none reported       Wound(s):       Comments    1/27: Pt with good appetite and intake. Consuming 100% of meals. Labs and meds reviewed.  No recent weight loss noted/reported. Will continue to monitor during stay.     Anthropometrics    Height: 5' 9" (175.3 cm) Height Method: Stated  Last Weight: 114.3 kg (251 lb 15.8 oz) (01/25/23 2104) Weight Method: Bed Scale  BMI (Calculated): 37.2  BMI Classification: obese grade II (BMI 35-39.9)        Ideal Body Weight (IBW), Male: 160 lb     % Ideal Body Weight, Male (lb): 157.49 %                          Usual Weight Provided By: EMR weight history    Wt Readings from Last 3 Encounters:   01/25/23 2104 114.3 kg (251 lb 15.8 oz)   01/25/23 0658 117.9 kg (260 lb)      Weight Change(s) Since Admission:  Admit Weight: 117.9 kg (260 lb) (01/25/23 0658)      Patient Education    Not applicable.    Monitoring & Evaluation     Dietitian will monitor food and beverage intake, energy intake, weight, " electrolyte/renal panel, glucose/endocrine profile, and gastrointestinal profile.  Nutrition Risk/Follow-Up: low (follow-up in 5-7 days)  Patients assigned 'low nutrition risk' status do not qualify for a full nutritional assessment but will be monitored and re-evaluated in a 5-7 day time period. Please consult if re-evaluation needed sooner.

## 2023-01-27 NOTE — PT/OT/SLP PROGRESS
"Speech Language Pathology Treatment    Patient Name:  Manpreet Josue   MRN:  [id number]  Admitting Diagnosis: CVA (cerebral vascular accident)    Recommendations:                 General Recommendations:  Speech/language therapy  Diet recommendations:  Soft & Bite Sized Diet - IDDSI Level 6, Liquid Diet Level: Thin liquids - IDDSI Level 0   General Precautions: Standard,    Communication strategies:   Increased volume w/over-articulation    Subjective     Pt alert in bed and compliant w/ST.      Objective:     Skilled intervention focused on Dysarthria tx    Assessment:     Manpreet Josue is a 52 y.o. male with an SLP diagnosis of Dysarthria.  He presents with mild-mod speech intelli. ST and Pt reviewed HEP. Pt completed OMEs to ensure comprehension of exercises. Pt's labial protrusion looked more symmetrical but no R side lateralization present. ST edu on speech strategies of increasing volume and over-articulation. Pt answered "wh" questions, w/increased volume and visual of Pt's mouth, w/~80% intelli. Increased volume was effective at increasing speech intelli. ST provided Pt w/handout and wrote on white board. Nursing edu on tx session and recommended speech strategies.     Goals:   Multidisciplinary Problems       SLP Goals          Problem: SLP    Goal Priority Disciplines Outcome   SLP Goal     SLP    Description: Pt's oral motor strength and ROM will increase as noted in ROM exercises and speech exercises on R side.   Pt's speech will increase to 90% w/o the visual of Pt's articulators and w/environmental noises.                                 Plan:     Patient to be seen:  3 x/week (M-F)   Plan of Care expires:  02/24/23  Plan of Care reviewed with:  patient and nursing  SLP Follow-Up:  Yes        Time Tracking:     SLP Treatment Date:   01/27/23  Speech Start Time:  0900  Speech Stop Time:  1000     Speech Total Time (min):  60 min    Billable Minutes: Speech Therapy Individual      01/27/2023  "

## 2023-01-27 NOTE — PROGRESS NOTES
[facility] - ICU Hospital Medicine  Progress Note    Patient Name: Manpreet Josue  MRN: [id number]  Patient Class: IP- Inpatient   Admission Date: 1/25/2023  Length of Stay: 2 days  Attending Physician: Woodrow Darby MD  Primary Care Provider: Primary Doctor No        Subjective:     Principal Problem:CVA (cerebral vascular accident)        HPI:  52 year old male with non significant medical history who presented to ED accompanied by his wife with complaints of worsening symptoms of slurred speech, right facial droop and right sided weakness. They admitted last known normal was Monday night prior to going to bed and noted symptoms on Tuesday but did not present for evaluation. He denies any positive sick contact. Denies fever, chills, N/V/D      Overview/Hospital Course:  1/26/23-The patient is still on a Cardene drip.  Will start titrating meds tomorrow which would have allowed for the permissive HTN.  He still has deficit.  Cardiology also saw him for his CHF.  Will need CM involvement due to lack of insurance for the patient.  I did have a lengthy discussion with the wife.    1/27/23-Patient is doing well at this time.  He is still on a Cardene drip.  Will start adding additional oral meds to help with his BP.  Therapy is still working with him as well.      Interval History:     Review of Systems   Constitutional:  Positive for activity change.   HENT: Negative.     Eyes: Negative.    Respiratory: Negative.     Cardiovascular: Negative.    Gastrointestinal: Negative.    Endocrine: Negative.    Genitourinary: Negative.    Musculoskeletal:  Positive for gait problem.   Skin: Negative.    Allergic/Immunologic: Negative.    Neurological:  Positive for facial asymmetry and weakness.   Hematological: Negative.    Psychiatric/Behavioral: Negative.     Objective:     Vital Signs (Most Recent):  Temp: 97.9 °F (36.6 °C) (01/27/23 1115)  Pulse: 86 (01/27/23 1145)  Resp: 17 (01/27/23 1145)  BP: (!) 163/97  (01/27/23 1130)  SpO2: 98 % (01/27/23 1145)   Vital Signs (24h Range):  Temp:  [97 °F (36.1 °C)-97.9 °F (36.6 °C)] 97.9 °F (36.6 °C)  Pulse:  [60-96] 86  Resp:  [6-23] 17  SpO2:  [92 %-99 %] 98 %  BP: (145-202)/() 163/97     Weight: 114.3 kg (251 lb 15.8 oz)  Body mass index is 37.21 kg/m².    Intake/Output Summary (Last 24 hours) at 1/27/2023 1217  Last data filed at 1/27/2023 0619  Gross per 24 hour   Intake 636.92 ml   Output 1200 ml   Net -563.08 ml      Physical Exam  Constitutional:       Appearance: Normal appearance. He is normal weight.   HENT:      Head: Normocephalic and atraumatic.      Nose: Nose normal.      Mouth/Throat:      Mouth: Mucous membranes are moist.      Pharynx: Oropharynx is clear.   Eyes:      Extraocular Movements: Extraocular movements intact.      Conjunctiva/sclera: Conjunctivae normal.      Pupils: Pupils are equal, round, and reactive to light.   Cardiovascular:      Rate and Rhythm: Normal rate and regular rhythm.      Pulses: Normal pulses.      Heart sounds: Normal heart sounds.   Pulmonary:      Effort: Pulmonary effort is normal.      Breath sounds: Normal breath sounds.   Abdominal:      General: Bowel sounds are normal.      Palpations: Abdomen is soft.   Musculoskeletal:         General: Normal range of motion.      Cervical back: Normal range of motion and neck supple.   Skin:     General: Skin is warm and dry.      Capillary Refill: Capillary refill takes 2 to 3 seconds.   Neurological:      General: No focal deficit present.      Mental Status: He is alert and oriented to person, place, and time. Mental status is at baseline.   Psychiatric:         Mood and Affect: Mood normal.         Behavior: Behavior normal.         Thought Content: Thought content normal.         Judgment: Judgment normal.       Significant Labs: All pertinent labs within the past 24 hours have been reviewed.  BMP: No results for input(s): GLU, NA, K, CL, CO2, BUN, CREATININE, CALCIUM, MG in  the last 48 hours.  CBC: No results for input(s): WBC, HGB, HCT, PLT in the last 48 hours.  CMP: No results for input(s): NA, K, CL, CO2, GLU, BUN, CREATININE, CALCIUM, PROT, ALBUMIN, BILITOT, ALKPHOS, AST, ALT, ANIONGAP, EGFRNONAA in the last 48 hours.    Invalid input(s): ESTGFAFRICA  Magnesium: No results for input(s): MG in the last 48 hours.    Significant Imaging: I have reviewed all pertinent imaging results/findings within the past 24 hours.      Assessment/Plan:      * CVA (cerebral vascular accident)  follow labs  Therapy  Still allowing permissive HTN until tomorrow  cardene drip  DVt prophylaxis    Acute combined systolic and diastolic congestive heart failure  Patient is identified as having Combined Systolic and Diastolic heart failure that is Acute. CHF is currently uncontrolled due to >3 pillow orthopnea. Latest ECHO performed and demonstrates- Results for orders placed during the hospital encounter of 01/25/23    Echo Saline Bubble? No    Interpretation Summary  · The left ventricle is mildly enlarged with moderate concentric hypertrophy and severely decreased systolic function.  · The estimated ejection fraction is 26%.  · Grade III left ventricular diastolic dysfunction.  · Mild tricuspid regurgitation.  · Mild mitral regurgitation.  · Mild right ventricular enlargement with mildly reduced right ventricular systolic function.  · Moderate left atrial enlargement.  · Mild right atrial enlargement.  · Elevated central venous pressure (15 mmHg).  · The estimated PA systolic pressure is 47 mmHg.  · There is pulmonary hypertension.  . Continue Beta Blocker and ACE/ARB and monitor clinical status closely. Monitor on telemetry. Patient is on CHF pathway.  Monitor strict Is&Os and daily weights.  Place on fluid restriction of 1 L. Continue to stress to patient importance of self efficacy and  on diet for CHF. Last BNP reviewed- and noted below No results for input(s): BNP, BNPTRIAGEBLO in the last  168 hours..      Primary hypertension  cardene drip  Will start titrating meds tomorrow        VTE Risk Mitigation (From admission, onward)         Ordered     IP VTE HIGH RISK PATIENT  Once         01/25/23 1649     Place sequential compression device  Until discontinued         01/25/23 1649            wean Cardene  Add lisinopril  Therapy  Labs as needed  DVT prophylaxis      Critical care time=37mins  Discharge Planning   FAIZA:      Code Status: Full Code   Is the patient medically ready for discharge?:     Reason for patient still in hospital (select all that apply): Laboratory test, Treatment, Consult recommendations and PT / OT recommendations  Discharge Plan A: Rehab                  Woodrow Walls MD  Department of Hospital Medicine   [facility] - ICU

## 2023-01-28 PROBLEM — R73.9 HYPERGLYCEMIA: Status: ACTIVE | Noted: 2023-01-28

## 2023-01-28 LAB
ALBUMIN SERPL-MCNC: 3.5 G/DL (ref 3.5–5)
ALBUMIN/GLOB SERPL: 0.9 RATIO (ref 1.1–2)
ALP SERPL-CCNC: 108 UNIT/L (ref 40–150)
ALT SERPL-CCNC: 26 UNIT/L (ref 0–55)
AST SERPL-CCNC: 21 UNIT/L (ref 5–34)
BILIRUBIN DIRECT+TOT PNL SERPL-MCNC: 1.2 MG/DL
BUN SERPL-MCNC: 26 MG/DL (ref 8.4–25.7)
CALCIUM SERPL-MCNC: 9.7 MG/DL (ref 8.4–10.2)
CHLORIDE SERPL-SCNC: 107 MMOL/L (ref 98–107)
CO2 SERPL-SCNC: 22 MMOL/L (ref 22–29)
CREAT SERPL-MCNC: 0.97 MG/DL (ref 0.73–1.18)
EST. AVERAGE GLUCOSE BLD GHB EST-MCNC: 203 MG/DL
GFR SERPLBLD CREATININE-BSD FMLA CKD-EPI: >60 MLS/MIN/1.73/M2
GLOBULIN SER-MCNC: 4 GM/DL (ref 2.4–3.5)
GLUCOSE SERPL-MCNC: 207 MG/DL (ref 74–100)
HBA1C MFR BLD: 8.7 %
MAGNESIUM SERPL-MCNC: 2.2 MG/DL (ref 1.6–2.6)
POCT GLUCOSE: 172 MG/DL (ref 70–110)
POCT GLUCOSE: 205 MG/DL (ref 70–110)
POTASSIUM SERPL-SCNC: 4.5 MMOL/L (ref 3.5–5.1)
PROT SERPL-MCNC: 7.5 GM/DL (ref 6.4–8.3)
SODIUM SERPL-SCNC: 138 MMOL/L (ref 136–145)

## 2023-01-28 PROCEDURE — 25000003 PHARM REV CODE 250: Performed by: INTERNAL MEDICINE

## 2023-01-28 PROCEDURE — 94761 N-INVAS EAR/PLS OXIMETRY MLT: CPT

## 2023-01-28 PROCEDURE — 80053 COMPREHEN METABOLIC PANEL: CPT | Performed by: INTERNAL MEDICINE

## 2023-01-28 PROCEDURE — 83735 ASSAY OF MAGNESIUM: CPT | Performed by: INTERNAL MEDICINE

## 2023-01-28 PROCEDURE — 20000000 HC ICU ROOM

## 2023-01-28 PROCEDURE — 63600175 PHARM REV CODE 636 W HCPCS: Performed by: INTERNAL MEDICINE

## 2023-01-28 PROCEDURE — 97530 THERAPEUTIC ACTIVITIES: CPT

## 2023-01-28 PROCEDURE — 25000003 PHARM REV CODE 250: Performed by: NURSE PRACTITIONER

## 2023-01-28 PROCEDURE — 83036 HEMOGLOBIN GLYCOSYLATED A1C: CPT | Performed by: INTERNAL MEDICINE

## 2023-01-28 RX ORDER — IBUPROFEN 200 MG
24 TABLET ORAL
Status: DISCONTINUED | OUTPATIENT
Start: 2023-01-28 | End: 2023-02-08 | Stop reason: HOSPADM

## 2023-01-28 RX ORDER — INSULIN ASPART 100 [IU]/ML
0-5 INJECTION, SOLUTION INTRAVENOUS; SUBCUTANEOUS
Status: DISCONTINUED | OUTPATIENT
Start: 2023-01-28 | End: 2023-02-08 | Stop reason: HOSPADM

## 2023-01-28 RX ORDER — GLUCAGON 1 MG
1 KIT INJECTION
Status: DISCONTINUED | OUTPATIENT
Start: 2023-01-28 | End: 2023-02-08 | Stop reason: HOSPADM

## 2023-01-28 RX ORDER — IBUPROFEN 200 MG
16 TABLET ORAL
Status: DISCONTINUED | OUTPATIENT
Start: 2023-01-28 | End: 2023-02-08 | Stop reason: HOSPADM

## 2023-01-28 RX ADMIN — MUPIROCIN 1 G: 20 OINTMENT TOPICAL at 09:01

## 2023-01-28 RX ADMIN — METOPROLOL SUCCINATE 25 MG: 25 TABLET, EXTENDED RELEASE ORAL at 08:01

## 2023-01-28 RX ADMIN — LISINOPRIL 20 MG: 20 TABLET ORAL at 08:01

## 2023-01-28 RX ADMIN — CLOPIDOGREL BISULFATE 75 MG: 75 TABLET ORAL at 08:01

## 2023-01-28 RX ADMIN — NICARDIPINE HYDROCHLORIDE 2 MG/HR: 0.2 INJECTION, SOLUTION INTRAVENOUS at 11:01

## 2023-01-28 RX ADMIN — INSULIN ASPART 1 UNITS: 100 INJECTION, SOLUTION INTRAVENOUS; SUBCUTANEOUS at 09:01

## 2023-01-28 NOTE — SUBJECTIVE & OBJECTIVE
Interval History:     Review of Systems   Constitutional:  Positive for activity change.   HENT: Negative.     Eyes: Negative.    Respiratory: Negative.     Cardiovascular: Negative.    Gastrointestinal: Negative.    Endocrine: Negative.    Genitourinary: Negative.    Musculoskeletal:  Positive for gait problem.   Skin: Negative.    Allergic/Immunologic: Negative.    Neurological:  Positive for facial asymmetry, speech difficulty, weakness and numbness.   Hematological: Negative.    Psychiatric/Behavioral: Negative.     Objective:     Vital Signs (Most Recent):  Temp: 97 °F (36.1 °C) (01/28/23 1113)  Pulse: 73 (01/28/23 1145)  Resp: 14 (01/28/23 1145)  BP: (!) 140/88 (01/28/23 1130)  SpO2: 96 % (01/28/23 1145)   Vital Signs (24h Range):  Temp:  [96.8 °F (36 °C)-97.3 °F (36.3 °C)] 97 °F (36.1 °C)  Pulse:  [64-85] 73  Resp:  [0-20] 14  SpO2:  [92 %-100 %] 96 %  BP: (113-164)/() 140/88     Weight: 114.3 kg (251 lb 15.8 oz)  Body mass index is 37.21 kg/m².    Intake/Output Summary (Last 24 hours) at 1/28/2023 1518  Last data filed at 1/28/2023 0554  Gross per 24 hour   Intake 703.16 ml   Output 700 ml   Net 3.16 ml      Physical Exam  Constitutional:       Appearance: Normal appearance. He is obese.   HENT:      Head: Normocephalic and atraumatic.      Nose: Nose normal.      Mouth/Throat:      Mouth: Mucous membranes are moist.      Pharynx: Oropharynx is clear.   Eyes:      Extraocular Movements: Extraocular movements intact.      Conjunctiva/sclera: Conjunctivae normal.      Pupils: Pupils are equal, round, and reactive to light.   Cardiovascular:      Rate and Rhythm: Normal rate and regular rhythm.      Pulses: Normal pulses.      Heart sounds: Normal heart sounds.   Pulmonary:      Effort: Pulmonary effort is normal.      Breath sounds: Normal breath sounds.   Abdominal:      General: Bowel sounds are normal.      Palpations: Abdomen is soft.   Musculoskeletal:         General: Normal range of motion.       Cervical back: Normal range of motion and neck supple.   Skin:     General: Skin is warm and dry.      Capillary Refill: Capillary refill takes 2 to 3 seconds.   Neurological:      Mental Status: He is alert and oriented to person, place, and time.      Motor: Weakness present.      Gait: Gait abnormal.   Psychiatric:         Mood and Affect: Mood normal.         Behavior: Behavior normal.         Thought Content: Thought content normal.         Judgment: Judgment normal.       Significant Labs: All pertinent labs within the past 24 hours have been reviewed.  BMP:   Recent Labs   Lab 01/28/23  0313      K 4.5   CO2 22   BUN 26.0*   CREATININE 0.97   CALCIUM 9.7   MG 2.20     CBC: No results for input(s): WBC, HGB, HCT, PLT in the last 48 hours.  CMP:   Recent Labs   Lab 01/28/23  0313      K 4.5   CO2 22   BUN 26.0*   CREATININE 0.97   CALCIUM 9.7   ALBUMIN 3.5   BILITOT 1.2   ALKPHOS 108   AST 21   ALT 26     Magnesium:   Recent Labs   Lab 01/28/23  0313   MG 2.20       Significant Imaging: I have reviewed all pertinent imaging results/findings within the past 24 hours.

## 2023-01-28 NOTE — PLAN OF CARE
Reviewed     The Prescription Monitoring Program has been reviewed for recent activity regarding controlled substances for this patient.      Per  last refill 11/8/2018 #60

## 2023-01-28 NOTE — PLAN OF CARE
Problem: Adult Inpatient Plan of Care  Goal: Plan of Care Review  Outcome: Ongoing, Progressing  Goal: Patient-Specific Goal (Individualized)  Outcome: Ongoing, Progressing  Goal: Absence of Hospital-Acquired Illness or Injury  Outcome: Ongoing, Progressing  Goal: Optimal Comfort and Wellbeing  Outcome: Ongoing, Progressing  Goal: Readiness for Transition of Care  Outcome: Ongoing, Progressing     Problem: Balance Impairment (Functional Deficit)  Goal: Improved Balance and Postural Control  Outcome: Ongoing, Progressing     Problem: Adjustment to Illness (Stroke, Ischemic/Transient Ischemic Attack)  Goal: Optimal Coping  Outcome: Ongoing, Progressing     Problem: Cerebral Tissue Perfusion (Stroke, Ischemic/Transient Ischemic Attack)  Goal: Optimal Cerebral Tissue Perfusion  Outcome: Ongoing, Progressing     Problem: Communication Impairment (Stroke, Ischemic/Transient Ischemic Attack)  Goal: Improved Communication Skills  Outcome: Ongoing, Progressing     Problem: Functional Ability Impaired (Stroke, Ischemic/Transient Ischemic Attack)  Goal: Optimal Functional Ability  Outcome: Ongoing, Progressing     Problem: Skin Injury Risk Increased  Goal: Skin Health and Integrity  Outcome: Ongoing, Progressing     Problem: Balance Impairment (Functional Deficit)  Goal: Improved Balance and Postural Control  Outcome: Ongoing, Progressing     Problem: Muscle Strength Impairment (Functional Deficit)  Goal: Improved Muscle Strength  Outcome: Ongoing, Progressing     Problem: Balance Impairment (Functional Deficit)  Goal: Improved Balance and Postural Control  Outcome: Ongoing, Progressing     Problem: Coordination Impairment (Functional Deficit)  Goal: Optimal Coordination  Outcome: Ongoing, Progressing     Problem: Muscle Strength Impairment (Functional Deficit)  Goal: Improved Muscle Strength  Outcome: Ongoing, Progressing     Problem: Muscle Tone Impairment (Functional Deficit)  Goal: Improved Muscle Tone  Outcome: Ongoing,  Progressing     Problem: Range of Motion Impairment (Functional Deficit)  Goal: Optimal Range of Motion  Outcome: Ongoing, Progressing     Problem: Diabetes Comorbidity  Goal: Blood Glucose Level Within Targeted Range  Outcome: Ongoing, Progressing

## 2023-01-28 NOTE — PT/OT/SLP PROGRESS
Physical Therapy Treatment    Patient Name:  Manpreet Josue   MRN:  [id number]    Recommendations:     Discharge Recommendations: rehabilitation facility  Discharge Equipment Recommendations: walker, rolling  Barriers to discharge: None    Assessment:     Manpreet Josue is a 52 y.o. male admitted with a medical diagnosis of CVA (cerebral vascular accident).  He presents with the following impairments/functional limitations: weakness, impaired endurance, impaired functional mobility, gait instability, impaired balance, decreased lower extremity function, decreased upper extremity function, decreased safety awareness, impaired self care skills, impaired coordination, decreased ROM, impaired sensation.    Patient reports fatigue today, worked on balance and was able to achieve and maintain midline with minimal assistance. Worked quite a bit on wt shifts  seated and standing in order to increase activity on R side, patient requested to return to supine after session. Encouraged him in self assisted ROM throughout the day.    Rehab Prognosis: Good; patient would benefit from acute skilled PT services to address these deficits and reach maximum level of function.    Recent Surgery: * No surgery found *      Plan:     During this hospitalization, patient to be seen daily to address the identified rehab impairments via gait training, therapeutic activities, therapeutic exercises and progress toward the following goals:    Plan of Care Expires:  02/23/23    Subjective     Chief Complaint: weakness to R  Patient/Family Comments/goals: to be able to get PT before returning home  Pain/Comfort:         Objective:     Communicated with patient prior to session.  Patient found HOB elevated with   upon PT entry to room.     General Precautions: Standard, fall  Orthopedic Precautions:    Braces:    Respiratory Status: Room air     Functional Mobility:  Bed mobility: Mod/Max   Sitting balance at EOB: Min/Mod, worked on wt shift into  RUE/LE with Mod A to obtain and maintain midline  Sit to stand : Mod /Max , worked on standing wt shifts with cuing to maintain RLE stability  Seated scooting : Mod A      AM-PAC 6 CLICK MOBILITY          Treatment & Education:  See above    Patient left in supine with HOB elevatedwith all lines intact and call button in reach..    GOALS:   Multidisciplinary Problems       Physical Therapy Goals          Problem: Physical Therapy    Goal Priority Disciplines Outcome Goal Variances Interventions   Physical Therapy Goal     PT, PT/OT Ongoing, Progressing     Description: Goals to be met by: 23     Patient will increase functional independence with mobility by performin. Supine to sit with Stand-by Assistance  2. Sit to stand transfer with Stand-by Assistance  3. Gait  x 50 feet with Contact Guard Assistance using Rolling Walker.                          Time Tracking:     PT Received On:  23  PT Start Time:     1030  PT Stop Time:  1050  PT Total Time (min):   20    Billable Minutes: Therapeutic Activity 20

## 2023-01-28 NOTE — PROGRESS NOTES
[facility] - ICU Hospital Medicine  Progress Note    Patient Name: Manpreet Josue  MRN: [id number]  Patient Class: IP- Inpatient   Admission Date: 1/25/2023  Length of Stay: 3 days  Attending Physician: Woodrow Darby MD  Primary Care Provider: Primary Doctor No        Subjective:     Principal Problem:CVA (cerebral vascular accident)        HPI:  52 year old male with non significant medical history who presented to ED accompanied by his wife with complaints of worsening symptoms of slurred speech, right facial droop and right sided weakness. They admitted last known normal was Monday night prior to going to bed and noted symptoms on Tuesday but did not present for evaluation. He denies any positive sick contact. Denies fever, chills, N/V/D      Overview/Hospital Course:  1/26/23-The patient is still on a Cardene drip.  Will start titrating meds tomorrow which would have allowed for the permissive HTN.  He still has deficit.  Cardiology also saw him for his CHF.  Will need CM involvement due to lack of insurance for the patient.  I did have a lengthy discussion with the wife.    1/27/23-Patient is doing well at this time.  He is still on a Cardene drip.  Will start adding additional oral meds to help with his BP.  Therapy is still working with him as well.    1/28/23-Patient is resting at this time.  His BP is much improved.  Will try top wean off the Cardene drip today.  Once he is stable off the drip then he can move to the floor.  Will also start a sliding scale because his HBA1C is 8.7.  Hopefully he can control diet with diet which will also be changed.      Interval History:     Review of Systems   Constitutional:  Positive for activity change.   HENT: Negative.     Eyes: Negative.    Respiratory: Negative.     Cardiovascular: Negative.    Gastrointestinal: Negative.    Endocrine: Negative.    Genitourinary: Negative.    Musculoskeletal:  Positive for gait problem.   Skin: Negative.     Allergic/Immunologic: Negative.    Neurological:  Positive for facial asymmetry, speech difficulty, weakness and numbness.   Hematological: Negative.    Psychiatric/Behavioral: Negative.     Objective:     Vital Signs (Most Recent):  Temp: 97 °F (36.1 °C) (01/28/23 1113)  Pulse: 73 (01/28/23 1145)  Resp: 14 (01/28/23 1145)  BP: (!) 140/88 (01/28/23 1130)  SpO2: 96 % (01/28/23 1145)   Vital Signs (24h Range):  Temp:  [96.8 °F (36 °C)-97.3 °F (36.3 °C)] 97 °F (36.1 °C)  Pulse:  [64-85] 73  Resp:  [0-20] 14  SpO2:  [92 %-100 %] 96 %  BP: (113-164)/() 140/88     Weight: 114.3 kg (251 lb 15.8 oz)  Body mass index is 37.21 kg/m².    Intake/Output Summary (Last 24 hours) at 1/28/2023 1518  Last data filed at 1/28/2023 0554  Gross per 24 hour   Intake 703.16 ml   Output 700 ml   Net 3.16 ml      Physical Exam  Constitutional:       Appearance: Normal appearance. He is obese.   HENT:      Head: Normocephalic and atraumatic.      Nose: Nose normal.      Mouth/Throat:      Mouth: Mucous membranes are moist.      Pharynx: Oropharynx is clear.   Eyes:      Extraocular Movements: Extraocular movements intact.      Conjunctiva/sclera: Conjunctivae normal.      Pupils: Pupils are equal, round, and reactive to light.   Cardiovascular:      Rate and Rhythm: Normal rate and regular rhythm.      Pulses: Normal pulses.      Heart sounds: Normal heart sounds.   Pulmonary:      Effort: Pulmonary effort is normal.      Breath sounds: Normal breath sounds.   Abdominal:      General: Bowel sounds are normal.      Palpations: Abdomen is soft.   Musculoskeletal:         General: Normal range of motion.      Cervical back: Normal range of motion and neck supple.   Skin:     General: Skin is warm and dry.      Capillary Refill: Capillary refill takes 2 to 3 seconds.   Neurological:      Mental Status: He is alert and oriented to person, place, and time.      Motor: Weakness present.      Gait: Gait abnormal.   Psychiatric:         Mood  and Affect: Mood normal.         Behavior: Behavior normal.         Thought Content: Thought content normal.         Judgment: Judgment normal.       Significant Labs: All pertinent labs within the past 24 hours have been reviewed.  BMP:   Recent Labs   Lab 01/28/23  0313      K 4.5   CO2 22   BUN 26.0*   CREATININE 0.97   CALCIUM 9.7   MG 2.20     CBC: No results for input(s): WBC, HGB, HCT, PLT in the last 48 hours.  CMP:   Recent Labs   Lab 01/28/23  0313      K 4.5   CO2 22   BUN 26.0*   CREATININE 0.97   CALCIUM 9.7   ALBUMIN 3.5   BILITOT 1.2   ALKPHOS 108   AST 21   ALT 26     Magnesium:   Recent Labs   Lab 01/28/23  0313   MG 2.20       Significant Imaging: I have reviewed all pertinent imaging results/findings within the past 24 hours.      Assessment/Plan:      * CVA (cerebral vascular accident)  follow labs  Therapy  Still allowing permissive HTN until tomorrow  cardene drip  DVt prophylaxis    Hyperglycemia  ISS  DM diet      Acute combined systolic and diastolic congestive heart failure  Patient is identified as having Combined Systolic and Diastolic heart failure that is Acute. CHF is currently uncontrolled due to >3 pillow orthopnea. Latest ECHO performed and demonstrates- Results for orders placed during the hospital encounter of 01/25/23    Echo Saline Bubble? No    Interpretation Summary  · The left ventricle is mildly enlarged with moderate concentric hypertrophy and severely decreased systolic function.  · The estimated ejection fraction is 26%.  · Grade III left ventricular diastolic dysfunction.  · Mild tricuspid regurgitation.  · Mild mitral regurgitation.  · Mild right ventricular enlargement with mildly reduced right ventricular systolic function.  · Moderate left atrial enlargement.  · Mild right atrial enlargement.  · Elevated central venous pressure (15 mmHg).  · The estimated PA systolic pressure is 47 mmHg.  · There is pulmonary hypertension.  . Continue Beta Blocker and  ACE/ARB and monitor clinical status closely. Monitor on telemetry. Patient is on CHF pathway.  Monitor strict Is&Os and daily weights.  Place on fluid restriction of 1 L. Continue to stress to patient importance of self efficacy and  on diet for CHF. Last BNP reviewed- and noted below No results for input(s): BNP, BNPTRIAGEBLO in the last 168 hours..      Primary hypertension  cardene drip  Will start titrating meds tomorrow        VTE Risk Mitigation (From admission, onward)         Ordered     IP VTE HIGH RISK PATIENT  Once         01/25/23 1649     Place sequential compression device  Until discontinued         01/25/23 1649              ISS  DM diet  Therapy  DVT prophylaxis  Therapy  Wean off cardene drip    Critical care time=35mins  Discharge Planning   FAIZA:      Code Status: Full Code   Is the patient medically ready for discharge?:     Reason for patient still in hospital (select all that apply): Laboratory test, Treatment, Consult recommendations and PT / OT recommendations  Discharge Plan A: Rehab                  Woodrow Walls MD  Department of Hospital Medicine   [facility] - ICU

## 2023-01-29 LAB
POCT GLUCOSE: 146 MG/DL (ref 70–110)
POCT GLUCOSE: 166 MG/DL (ref 70–110)
POCT GLUCOSE: 281 MG/DL (ref 70–110)

## 2023-01-29 PROCEDURE — 97530 THERAPEUTIC ACTIVITIES: CPT

## 2023-01-29 PROCEDURE — 94761 N-INVAS EAR/PLS OXIMETRY MLT: CPT

## 2023-01-29 PROCEDURE — 25000003 PHARM REV CODE 250: Performed by: INTERNAL MEDICINE

## 2023-01-29 PROCEDURE — 63600175 PHARM REV CODE 636 W HCPCS: Performed by: INTERNAL MEDICINE

## 2023-01-29 PROCEDURE — 25000003 PHARM REV CODE 250: Performed by: NURSE PRACTITIONER

## 2023-01-29 PROCEDURE — 20000000 HC ICU ROOM

## 2023-01-29 RX ORDER — METOPROLOL SUCCINATE 50 MG/1
50 TABLET, EXTENDED RELEASE ORAL DAILY
Status: DISCONTINUED | OUTPATIENT
Start: 2023-01-30 | End: 2023-02-08 | Stop reason: HOSPADM

## 2023-01-29 RX ADMIN — INSULIN ASPART 3 UNITS: 100 INJECTION, SOLUTION INTRAVENOUS; SUBCUTANEOUS at 11:01

## 2023-01-29 RX ADMIN — METOPROLOL SUCCINATE 25 MG: 25 TABLET, EXTENDED RELEASE ORAL at 08:01

## 2023-01-29 RX ADMIN — LISINOPRIL 20 MG: 20 TABLET ORAL at 08:01

## 2023-01-29 RX ADMIN — MUPIROCIN 1 G: 20 OINTMENT TOPICAL at 08:01

## 2023-01-29 RX ADMIN — CLOPIDOGREL BISULFATE 75 MG: 75 TABLET ORAL at 08:01

## 2023-01-29 RX ADMIN — MUPIROCIN 1 G: 20 OINTMENT TOPICAL at 09:01

## 2023-01-29 NOTE — PT/OT/SLP PROGRESS
Physical Therapy Treatment    Patient Name:  Manpreet Josue   MRN:  [id number]    Recommendations:     Discharge Recommendations: rehabilitation facility  Discharge Equipment Recommendations: walker, rolling, ector walker  Barriers to discharge: None    Assessment:     Manpreet Josue is a 52 y.o. male admitted with a medical diagnosis of CVA (cerebral vascular accident).  He presents with the following impairments/functional limitations: weakness, impaired endurance, impaired functional mobility, gait instability, impaired balance, decreased lower extremity function, decreased upper extremity function, decreased safety awareness, impaired self care skills, impaired coordination, decreased ROM, impaired sensation.    Patient worked on balance and was able to achieve and maintain midline with minimal assistance. Worked quite a bit on wt shifts  seated and standing in order to increase activity on R side, patient agreeable to transfer to BS chair today, encouraged him in self assisted ROM throughout the day. Patient continues to have significant weakness on R UE/LE.    Rehab Prognosis: Good; patient would benefit from acute skilled PT services to address these deficits and reach maximum level of function.    Recent Surgery: * No surgery found *      Plan:     During this hospitalization, patient to be seen daily to address the identified rehab impairments via gait training, therapeutic activities, therapeutic exercises and progress toward the following goals:    Plan of Care Expires:  02/23/23    Subjective     Chief Complaint: weakness to R  Patient/Family Comments/goals: to be able to get PT before returning home  Pain/Comfort:         Objective:     Communicated with patient prior to session.  Patient found HOB elevated with   upon PT entry to room.     General Precautions: Standard, fall  Orthopedic Precautions:    Braces:    Respiratory Status: Room air     Functional Mobility:  Bed mobility: Mod/Max   Sitting  balance at EOB: Min/Mod, worked on wt shift into RUE/LE with Mod A to obtain and maintain midline  Sit to stand : Mod /Max , worked on standing wt shifts with cuing to maintain RLE stability  Seated scooting : Mod A  Transfer with modified stand pivot to BS chair with Max A      AM-PAC 6 CLICK MOBILITY          Treatment & Education:  See above    Patient left in bedside chair with all lines intact and call button in reach.. Discussed with nursing safe transfer to return to bed.    GOALS:   Multidisciplinary Problems       Physical Therapy Goals          Problem: Physical Therapy    Goal Priority Disciplines Outcome Goal Variances Interventions   Physical Therapy Goal     PT, PT/OT Ongoing, Progressing     Description: Goals to be met by: 23     Patient will increase functional independence with mobility by performin. Supine to sit with Stand-by Assistance  2. Sit to stand transfer with Stand-by Assistance  3. Gait  x 50 feet with Contact Guard Assistance using Rolling Walker.                          Time Tracking:     PT Received On:  23  PT Start Time:   0815  PT Stop Time:  0835  PT Total Time (min):   20    Billable Minutes: Therapeutic Activity 20

## 2023-01-30 LAB
POCT GLUCOSE: 164 MG/DL (ref 70–110)
POCT GLUCOSE: 164 MG/DL (ref 70–110)
POCT GLUCOSE: 188 MG/DL (ref 70–110)
POCT GLUCOSE: 188 MG/DL (ref 70–110)
POCT GLUCOSE: 195 MG/DL (ref 70–110)

## 2023-01-30 PROCEDURE — 25000003 PHARM REV CODE 250: Performed by: NURSE PRACTITIONER

## 2023-01-30 PROCEDURE — 25000003 PHARM REV CODE 250: Performed by: INTERNAL MEDICINE

## 2023-01-30 PROCEDURE — 97110 THERAPEUTIC EXERCISES: CPT

## 2023-01-30 PROCEDURE — 92507 TX SP LANG VOICE COMM INDIV: CPT

## 2023-01-30 PROCEDURE — 11000001 HC ACUTE MED/SURG PRIVATE ROOM

## 2023-01-30 PROCEDURE — 21400001 HC TELEMETRY ROOM

## 2023-01-30 PROCEDURE — 97530 THERAPEUTIC ACTIVITIES: CPT

## 2023-01-30 PROCEDURE — 94761 N-INVAS EAR/PLS OXIMETRY MLT: CPT

## 2023-01-30 PROCEDURE — 97112 NEUROMUSCULAR REEDUCATION: CPT

## 2023-01-30 RX ORDER — LISINOPRIL 20 MG/1
40 TABLET ORAL DAILY
Status: DISCONTINUED | OUTPATIENT
Start: 2023-01-30 | End: 2023-02-08 | Stop reason: HOSPADM

## 2023-01-30 RX ADMIN — CLOPIDOGREL BISULFATE 75 MG: 75 TABLET ORAL at 09:01

## 2023-01-30 RX ADMIN — METOPROLOL SUCCINATE 50 MG: 50 TABLET, EXTENDED RELEASE ORAL at 09:01

## 2023-01-30 RX ADMIN — LORAZEPAM 1 MG: 1 TABLET ORAL at 12:01

## 2023-01-30 RX ADMIN — MUPIROCIN 1 G: 20 OINTMENT TOPICAL at 09:01

## 2023-01-30 RX ADMIN — LISINOPRIL 40 MG: 20 TABLET ORAL at 09:01

## 2023-01-30 NOTE — PT/OT/SLP PROGRESS
Speech Language Pathology Treatment    Patient Name:  Manpreet Josue   MRN:  [id number]  Admitting Diagnosis: CVA (cerebral vascular accident)    Recommendations:                 General Recommendations:  Speech/language therapy  Diet recommendations:  Soft & Bite Sized Diet - IDDSI Level 6, Liquid Diet Level: Thin liquids - IDDSI Level 0   General Precautions: Standard,    Communication strategies:   Increased volume w/over-articulation    Subjective     Pt alert in bed and compliant w/ST.      Objective:     Skilled intervention focused on Dysarthria tx    Assessment:     Manpreet Josue is a 52 y.o. male with an SLP diagnosis of Dysarthria.  He presents with mild-mod speech intelli. Pt reported he utilized HEP and loud speech. Pt and ST participated in simple conversational turn-taking and the Pt was aware of increasing volume which increased his speech intelli. Pt reported his children even mentioned increased speech intelli over the weekend.   Pt completed lingual lateralization w/elevation and depression x60. Lingual circles x60, pucker/retract exercises x60, and labial strengthening via straw blowing. Pt edu to cont w/use of loud speech and HEP while in his room. Pt verbalized agreement.     Goals:   Multidisciplinary Problems       SLP Goals          Problem: SLP    Goal Priority Disciplines Outcome   SLP Goal     SLP    Description: Pt's oral motor strength and ROM will increase as noted in ROM exercises and speech exercises on R side.   Pt's speech will increase to 90% w/o the visual of Pt's articulators and w/environmental noises.                                 Plan:     Patient to be seen:  3 x/week   Plan of Care expires:  02/24/23  Plan of Care reviewed with:  patient and nursing  SLP Follow-Up:  Yes        Time Tracking:     SLP Treatment Date:   01/30/23  Speech Start Time:  1445  Speech Stop Time:  1515     Speech Total Time (min):  30 min    Billable Minutes: Speech Therapy Individual       01/30/2023

## 2023-01-30 NOTE — PROGRESS NOTES
[facility] - ICU  Cardiology  Consult Note    Patient Name: Manpreet Josue  MRN: [id number]  Admission Date: 1/25/2023  Hospital Length of Stay: 5 days  Code Status: Full Code   Attending Provider: Woodrow Darby MD   Consulting Provider: GENE Duke  Primary Care Physician: Primary Doctor No  Principal Problem:CVA (cerebral vascular accident)    Patient information was obtained from patient, ER records, and primary team.       Subjective:     Chief Complaint: Right sided weakness     HPI: Patient is a 53 yo male unknown to our services.  His symptoms started yesterday morning with some disorientation and difficulty speaking.  He also had some weakness in his right hand and right leg.  He decided that he should come into the ER for an evaluation.  Upon arrival systolic blood pressure was 220/125, EKG was done which showed a normal sinus rhythm and some lateral T-wave inversions.  Lab work showed a minimally elevated troponin that tapered down with the 2nd set.  Initial CT scan was negative.  Chest x-ray was consistent with some vascular congestion.  Carotid ultrasound was negative.  MRI did show left thalamic infarct.  He was seen by vascular Neurology who loaded him with Plavix aspirin and high-intensity statin.      1/26/23: Patient sitting up in chair eating lunch without distress. He denies any CP or SOB. He remains on cardene gtt for BP.   1/27/23: Patient in bed without distress. He denies any CP or SOB.  1/29/23: Patient resting in bed without distress. He is off Cardene gtt. He denies any CP or SOB.       No current facility-administered medications on file prior to encounter.     No current outpatient medications on file prior to encounter.     Tobacco Use    Smoking status: Not on file    Smokeless tobacco: Not on file   Substance and Sexual Activity    Alcohol use: Not on file    Drug use: Not on file    Sexual activity: Not on file     Review of Systems   Constitutional: Negative.    Cardiovascular: Negative.    Respiratory: Negative.     Skin: Negative.    Musculoskeletal:  Positive for muscle weakness.   Gastrointestinal: Negative.    Neurological:         R UE and L weakness, facial droop    Psychiatric/Behavioral: Negative.     Allergic/Immunologic: Negative.    Objective:     Vital Signs (Most Recent):  Temp: 97.5 °F (36.4 °C) (01/30/23 0715)  Pulse: 82 (01/30/23 0903)  Resp: 15 (01/30/23 0715)  BP: (!) 163/105 (01/30/23 0903)  SpO2: 97 % (01/30/23 0715)   Vital Signs (24h Range):  Temp:  [97 °F (36.1 °C)-98.4 °F (36.9 °C)] 97.5 °F (36.4 °C)  Pulse:  [62-86] 82  Resp:  [6-23] 15  SpO2:  [93 %-99 %] 97 %  BP: (123-179)/() 163/105     Weight: 114.3 kg (251 lb 15.8 oz)  Body mass index is 37.21 kg/m².    SpO2: 97 %         Intake/Output Summary (Last 24 hours) at 1/30/2023 0949  Last data filed at 1/30/2023 0549  Gross per 24 hour   Intake 200 ml   Output 1400 ml   Net -1200 ml         Lines/Drains/Airways       Peripheral Intravenous Line  Duration                  Peripheral IV - Single Lumen Anterior;Distal;Left Upper Arm -- days                    Physical Exam  Constitutional:       Appearance: Normal appearance. He is obese.   Eyes:      Extraocular Movements: Extraocular movements intact.   Cardiovascular:      Rate and Rhythm: Normal rate and regular rhythm.   Pulmonary:      Effort: Pulmonary effort is normal.      Breath sounds: Normal breath sounds.   Musculoskeletal:      Comments: R sided hemiparesis   Skin:     General: Skin is warm and dry.   Neurological:      Mental Status: He is alert.      Motor: Weakness present.   Psychiatric:         Mood and Affect: Mood normal.         Behavior: Behavior normal.       Significant Imaging:   Echocardiogram 1/26/23:  The left ventricle is mildly enlarged with moderate concentric hypertrophy and severely decreased systolic function.  The estimated ejection fraction is 26%.  Grade III left ventricular diastolic dysfunction.  Mild  tricuspid regurgitation.  Mild mitral regurgitation.  Mild right ventricular enlargement with mildly reduced right ventricular systolic function.  Moderate left atrial enlargement.  Mild right atrial enlargement.  Elevated central venous pressure (15 mmHg).  The estimated PA systolic pressure is 47 mmHg.  There is pulmonary hypertension.    Assessment and Plan:       1. Acute CVA w/ right sided hemiparesis     - Continue aspirin and plavix     - Monitor Tele     - Continue therapy  2. Cardiomyopathy     - Continue Toprol 50 mg daily.      - Increase Lisinopril 40 mg daily     - LifeVest evaluation in progress     - Ischemic evaluation as outpatient     - Spoke with  and they will set up with Cardiology at [facility] after discharge  3. HTN Urgency      - Cardene gtt off     - Continue BB and increase ACEi  4. Elevated Troponin     - Patient denies CP and trend has remained flat     - Type II MI s/t CVA and hypertensive urgency       Case discussed with Dr Tony Combs, ADALIDP  Cardiology   [facility] - Emergency Dept

## 2023-01-30 NOTE — PT/OT/SLP PROGRESS
"Physical Therapy Treatment    Patient Name:  Manpreet Josue   MRN:  [id number]    Recommendations:     Discharge Recommendations: rehabilitation facility  Discharge Equipment Recommendations: walker, rolling  Barriers to discharge: None    Assessment:     Manpreet Josue is a 52 y.o. male admitted with a medical diagnosis of CVA (cerebral vascular accident).  He presents with the following impairments/functional limitations: weakness, impaired endurance, impaired functional mobility, gait instability, impaired balance, decreased lower extremity function, decreased upper extremity function, decreased safety awareness, impaired self care skills, impaired coordination, decreased ROM, impaired sensation.    Pt requiring mod-max A to safely perform functional mobility tasks and transfers. Pt states that he is having increased "tingling" to RLE however to return in strength. Reviewed safety with transfers.     Rehab Prognosis: Good; patient would benefit from acute skilled PT services to address these deficits and reach maximum level of function.    Recent Surgery: * No surgery found *      Plan:     During this hospitalization, patient to be seen daily to address the identified rehab impairments via gait training, therapeutic activities, therapeutic exercises and progress toward the following goals:    Plan of Care Expires:  02/23/23    Subjective     Chief Complaint: weakness to R  Patient/Family Comments/goals: to be able to get PT before returning home  Pain/Comfort:         Objective:     Communicated with patient prior to session.  Patient found HOB elevated with telemetry, pulse ox (continuous), peripheral IV, PureWick upon PT entry to room.     General Precautions: Standard, fall  Orthopedic Precautions:    Braces:    Respiratory Status: Room air     Functional Mobility:  Bed Mobility:     Supine to Sit: moderate assistance  Transfers:     Sit to Stand:  moderate assistance and maximal assistance with rolling " walker  Bed to Chair: moderate assistance and maximal assistance with  no AD  using  Stand Pivot  Balance: min-CGA in sitting, mod A in supported standing      AM-PAC 6 CLICK MOBILITY          Treatment & Education:  See above    Patient left up in chair with all lines intact and call button in reach..    GOALS:   Multidisciplinary Problems       Physical Therapy Goals          Problem: Physical Therapy    Goal Priority Disciplines Outcome Goal Variances Interventions   Physical Therapy Goal     PT, PT/OT Ongoing, Progressing     Description: Goals to be met by: 23     Patient will increase functional independence with mobility by performin. Supine to sit with Stand-by Assistance  2. Sit to stand transfer with Stand-by Assistance  3. Gait  x 50 feet with Contact Guard Assistance using Rolling Walker.                          Time Tracking:     PT Received On: 23  PT Start Time: 1000     PT Stop Time: 1020  PT Total Time (min): 20 min     Billable Minutes: Therapeutic Activity 20    Treatment Type: Treatment  PT/PTA: PTA           2023

## 2023-01-30 NOTE — PROGRESS NOTES
[facility] Medicine Progress Note    Patient Name: Manpreet Josue  Age: 52 y.o.   MRN: [id number]  Admission Date: 1/25/2023  7:08 AM   Patient Class: IP- Inpatient  Benefits: Payor: /    Primary Care Provider: Primary Doctor No   Pharmacy:   [facility] - [place] - [address]  [address]  [place] [address]  Phone: [phone] Fax: [phone]    Code Status: Full Code      Chief Complaint:   Chief Complaint   Patient presents with    Cerebrovascular Accident     Pt and wife report pt having slurred speech and rt sided weakness with difficulty walking. Rt sided facial droop noted. Symptom started yesterday am.        Admit Diagnosis:   CVA (cerebral vascular accident) [I63.9]  Stroke [I63.9]     HPI:  52 year old male with non significant medical history who presented to ED accompanied by his wife with complaints of worsening symptoms of slurred speech, right facial droop and right sided weakness. They admitted last known normal was Monday night prior to going to bed and noted symptoms on Tuesday but did not present for evaluation. He denies any positive sick contact. Denies fever, chills, N/V/D   *As documented in Admit H&P by Woodrow Darby MD    SUBJECTIVE:     Follow-up:  CVA (cerebral vascular accident)    Hospital Coarse:  1/26/23-The patient is still on a Cardene drip.  Will start titrating meds tomorrow which would have allowed for the permissive HTN.  He still has deficit.  Cardiology also saw him for his CHF.  Will need CM involvement due to lack of insurance for the patient.  I did have a lengthy discussion with the wife.    1/27/23-Patient is doing well at this time.  He is still on a Cardene drip.  Will start adding additional oral meds to help with his BP.  Therapy is still working with him as well.    1/28/23-Patient is resting at this time.  His BP is much improved.  Will try top wean off the Cardene drip today.  Once he is stable off the drip  then he can move to the floor.  Will also start a sliding scale because his HBA1C is 8.7.  Hopefully he can control diet with diet which will also be changed.    1/29  - Awake, alert, wife present. Updated on condition and plan. All questions answered. Off Cardene gtt this 2 am. BP still running a little high. Increase Toprol XL to 50mg daily. Cont ASA/Plavix. Add Metformin for DM2. Downgrade to floor.    1/30  - No complaints. Feels well. Home soon.           OBJECTIVE:     Vital Signs (Most Recent)  Temp: 97.7 °F (36.5 °C) (01/30/23 1115)  Pulse: 77 (01/30/23 1300)  Resp: 17 (01/30/23 1300)  BP: (!) 150/87 (01/30/23 1300)  SpO2: 97 % (01/30/23 1300)      Vital Signs Range (Last 24H):  Temp:  [97.5 °F (36.4 °C)-98.4 °F (36.9 °C)]   Pulse:  [62-98]   Resp:  [6-23]   BP: (123-178)/()   SpO2:  [93 %-99 %]  .vi    I & O (Last 24H):  Intake/Output Summary (Last 24 hours) at 1/30/2023 1736  Last data filed at 1/30/2023 1205  Gross per 24 hour   Intake 200 ml   Output 2175 ml   Net -1975 ml         Scheduled Meds:   aspirin  324 mg Oral Once    clopidogreL  75 mg Oral Daily    lisinopriL  40 mg Oral Daily    metoprolol succinate  50 mg Oral Daily    mupirocin   Nasal BID     Continuous Infusions:  PRN Meds:   dextrose 10%    dextrose 10%    glucagon (human recombinant)    glucose    glucose    insulin aspart U-100    LORazepam    ondansetron    sodium chloride 0.9%      Lines/Drains:   Lines/Drains/Airways       None                       Physical Exam  Constitutional:       General: He is not in acute distress.  Cardiovascular:      Rate and Rhythm: Normal rate and regular rhythm.   Pulmonary:      Effort: Pulmonary effort is normal.      Breath sounds: Normal breath sounds.   Skin:     General: Skin is warm.   Neurological:      Mental Status: He is alert and oriented to person, place, and time.      Comments: RUE/RLE paresis, R facial droop        Recent Labs   Lab 01/25/23  0743 01/25/23  0749   WBC 7.8  --     HGB 14.2  --    HCT 44.4  --      --    MCV 92.1  --    PTT  --  26.0   INR  --  1.10        Recent Labs   Lab 01/25/23  0748 01/28/23  0313    138   K 4.4 4.5   CHLORIDE 105 107   CO2 24 22   BUN 13.0 26.0*   CREATININE 0.92 0.97   GLUCOSE 229* 207*   MG  --  2.20   CALCIUM 9.2 9.7   ALBUMIN 3.9 3.5   BILITOT 1.4 1.2   ALKPHOS 108 108   AST 20 21   ALT 30 26       Lab Results   Component Value Date    HGBA1C 8.7 (H) 01/28/2023        Echo Saline Bubble? No  · The left ventricle is mildly enlarged with moderate concentric   hypertrophy and severely decreased systolic function.  · The estimated ejection fraction is 26%.  · Grade III left ventricular diastolic dysfunction.  · Mild tricuspid regurgitation.  · Mild mitral regurgitation.  · Mild right ventricular enlargement with mildly reduced right ventricular   systolic function.  · Moderate left atrial enlargement.  · Mild right atrial enlargement.  · Elevated central venous pressure (15 mmHg).  · The estimated PA systolic pressure is 47 mmHg.  · There is pulmonary hypertension.     MRI Brain Without Contrast  Narrative: EXAMINATION:  MRI BRAIN WITH CONTRAST:    CLINICAL HISTORY:  , Stroke, follow up;    COMPARISON:  None available    FINDINGS:  Serial axial,coronal, and sagittal 1.5 Carina images were obtained of the brain using T1 and T2- weighted techniques the administration of IV contrast. Ventricles, cisterns, and sulci are mildly prominent size.  There is no evidence of midline shift, mass effect, or abnormal extra-axial fluid collections.  Flow void is noted to the superior sagittal sinus and visualized intracranial vessels on T2 sequence imaging.  The right vertebral artery slightly dominant.  There is minimal scattered mucosal thickening at the ethmoid sinuses.  Orbital globes are symmetric in size shape and signal intensity.  Cerebellar tonsils extend caudally to the level of the foramen magnum.  There is focal abnormal somewhat bilobed  signal intensity at the posterior left corona radiata and superior left thalamus on diffusion-weighted imaging compatible with a focus of acute ischemia measuring approximately 2 x 1 cm.  No other focus of abnormal signal intensity is identified on diffusion weighted imaging.  There is mild motion artifact.  Scattered small foci of abnormal signal intensity are evident at the periventricular and subcortical white matter on FLAIR sequence imaging.  Impression: 1. Mild motion artifact  2. Somewhat bilobed focus of abnormal signal intensity at the posterior left corona radiata/superior left thalamus compatible with a focus of acute ischemia measuring approximately 2 x 1 cm  3. Generalized cerebral and cerebellar atrophy  4. Scattered foci of abnormal signal intensity at the periventricular and subcortical white matter suspicious for foci of ischemia versus gliosis    Electronically signed by: Andres Romero  Date:    01/25/2023  Time:    10:00  US Carotid Bilateral  Narrative: EXAMINATION:  US CAROTID BILATERAL    CLINICAL HISTORY:  , Cerebral infarction, unspecified.    COMPARISON:  None available    FINDINGS:  Real-time imaging was performed through the right and left carotid arteries using duplex Doppler imaging. NASCET utilized. Mild scattered plaque formation at the carotid bulbs and proximal internal are carotid arteries bilaterally with no significant stenosis identified.  Antegrade flow is evident within the vertebral arteries bilaterally.    MEASUREMENTS:    Right Systolic Velocities(cm/s):    Internal Carotid: 65.3 cm/second    Common Carotid: 78.1 cm/second    Right IC:CC Ratio: 0.8    Left Systolic Velocities(cm/s):    Internal Carotid: 77.9 cm/second    Common Carotid: 80.1 cm/second    Left IC:CC Ratio: 1.0  Impression: 1. No significant localized atherosclerotic plaque formation or stenosis noted to the visualized portions of the carotid arteries bilaterally.    Electronically signed by: Andres  Nick  Date:    01/25/2023  Time:    09:49  X-Ray Chest AP Portable  Narrative: EXAMINATION:  XR CHEST AP PORTABLE    CLINICAL HISTORY:  Stroke;, .    COMPARISON:  None available    FINDINGS:  An AP view or more reveals the heart to be mildly enlarged.  The trachea is midline.  Pulmonary vasculature is prominent.  No consolidative infiltrate or effusion is seen.  Degenerative changes are noted to the thoracic spine.  Impression: 1. Mild cardiomegaly  2. Prominent pulmonary vasculature  3. Thoracic spondylosis    Electronically signed by: Andres Romero  Date:    01/25/2023  Time:    09:25  CT Head Without Contrast  Narrative: EXAMINATION:  CT of the head without contrast    CLINICAL HISTORY:  Right-sided weakness, numbness    TECHNIQUE:  Routine CT of the head was performed without intravenous contrast    Total DLP: 1787 mGy.cm    Automatic exposure control was utilized to reduce the patient's dose    COMPARISON:  None    FINDINGS:  There is no acute intracranial hemorrhage, midline shift, mass-effect, or extra-axial collection.  The ventricular system is normal in size and configuration.  There are mild patchy areas of decreased attenuation in the periventricular, deep, subcortical white matter, while nonspecific, most commonly sequela of chronic microvascular ischemia.  No sulcal effacement.  Gray-white matter differentiation is preserved.  Visualized osseous structures are intact.  Visualized paranasal sinuses and mastoid air cells are clear  Impression: No acute intracranial abnormality.    Supratentorial white matter changes, while nonspecific, most likely sequela of chronic microvascular ischemia.    Electronically signed by: Sunil Coffman MD  Date:    01/25/2023  Time:    07:53           ASSESSMENT/PLAN:       CVA (cerebral vascular accident)    Primary hypertension    Acute combined systolic and diastolic congestive heart failure    Hyperglycemia       - cont current  - pt does not have insurance  - will  plan to dc home possibly tomorrow  - BP better controlled; titrating meds        VTE Risk Mitigation (From admission, onward)           Ordered     IP VTE HIGH RISK PATIENT  Once         01/25/23 1649     Place sequential compression device  Until discontinued         01/25/23 1649                           Woodrow Darby MD  [facility] Medicine   [facility]

## 2023-01-30 NOTE — PT/OT/SLP PROGRESS
Occupational Therapy   Treatment    Name: Manpreet Josue  MRN: [id number]  Admitting Diagnosis:  CVA (cerebral vascular accident)       Recommendations:     Discharge Recommendations: rehabilitation facility  Discharge Equipment Recommendations:     Barriers to discharge:       Assessment:     Manpreet Josue is a 52 y.o. male with a medical diagnosis of CVA (cerebral vascular accident).  He presents with performance deficits affecting function are weakness, impaired endurance, impaired self care skills, impaired functional mobility, gait instability, impaired balance, decreased coordination, decreased lower extremity function, decreased upper extremity function.     Pt with BTB recently with PT, but had been up in chair X3 hours earlier today. Pt was fatigued but agreeable to supine RUE NDT tasks. Pt with tapping, WB ac over RUE and forced use of arm to increase AROM and normalize tone. Noted trace movements in shoulder elevation and retraction with gravity-eliminate plane; no mm activation with shoulder flexion, bicep/tricep or wrist movements. With repetitive NDT techniques finally realized minimal mm contraction through digits flexors which was a GREAT improvement. Pt has good sensation trough RUE and noted decreased tightness in all joint mobility with OT PROM denoting good follow through of HEP encouraged by OT Friday.     Rehab Prognosis:  Good; patient would benefit from acute skilled OT services to address these deficits and reach maximum level of function.       Plan:     Patient to be seen 3 x/week to address the above listed problems via self-care/home management, therapeutic activities, therapeutic exercises, neuromuscular re-education  Plan of Care Expires:    Plan of Care Reviewed with: patient, spouse, son    Subjective     Pain/Comfort:       Objective:     Communicated with: nursing prior to session.  Patient found HOB elevated with   upon OT entry to room.    General Precautions: Standard,       Orthopedic Precautions:   Braces:    Respiratory Status: Room air     Occupational Performance:       [facility] 6 Click ADL:      Treatment & Education:  See above    Patient left HOB elevated with all lines intact, call button in reach, and bed alarm on    GOALS:   Multidisciplinary Problems       Occupational Therapy Goals          Problem: Occupational Therapy    Goal Priority Disciplines Outcome Interventions   Occupational Therapy Goal     OT, PT/OT Ongoing, Progressing    Description: Goals to be met by: 2/16/23     Patient will increase functional independence with ADLs by performing:    Feeding with Set-up Assistance.  Grooming while seated with Set-up Assistance and use of adaptive techniques  Sitting at edge of bed x20 minutes with Contact Guard Assistance.  Increased functional strength to 4/5 for increased (I) with ADLs with LUE; RUE AROM to increase as able.                         Time Tracking:     OT Date of Treatment: 01/30/23  OT Start Time: 1505  OT Stop Time: 1530  OT Total Time (min): 25 min    Billable Minutes:Therapeutic Exercise 10  Neuromuscular Re-education 15    OT/SAUNDRA: OT          1/30/2023

## 2023-01-30 NOTE — PROGRESS NOTES
[facility] Medicine Progress Note    Patient Name: Manpreet Josue  Age: 52 y.o.   MRN: [id number]  Admission Date: 1/25/2023  7:08 AM   Patient Class: IP- Inpatient  Benefits: Payor: /    Primary Care Provider: Primary Doctor No   Pharmacy:   [facility] - [place] - [address]  [address]  [place] [address]  Phone: [phone] Fax: [phone]    Code Status: Full Code      Chief Complaint:   Chief Complaint   Patient presents with    Cerebrovascular Accident     Pt and wife report pt having slurred speech and rt sided weakness with difficulty walking. Rt sided facial droop noted. Symptom started yesterday am.        Admit Diagnosis:   CVA (cerebral vascular accident) [I63.9]  Stroke [I63.9]     HPI:  52 year old male with non significant medical history who presented to ED accompanied by his wife with complaints of worsening symptoms of slurred speech, right facial droop and right sided weakness. They admitted last known normal was Monday night prior to going to bed and noted symptoms on Tuesday but did not present for evaluation. He denies any positive sick contact. Denies fever, chills, N/V/D   *As documented in Admit H&P by Woodrow Darby MD    SUBJECTIVE:     Follow-up:  CVA (cerebral vascular accident)    Hospital Coarse:  1/26/23-The patient is still on a Cardene drip.  Will start titrating meds tomorrow which would have allowed for the permissive HTN.  He still has deficit.  Cardiology also saw him for his CHF.  Will need CM involvement due to lack of insurance for the patient.  I did have a lengthy discussion with the wife.    1/27/23-Patient is doing well at this time.  He is still on a Cardene drip.  Will start adding additional oral meds to help with his BP.  Therapy is still working with him as well.    1/28/23-Patient is resting at this time.  His BP is much improved.  Will try top wean off the Cardene drip today.  Once he is stable off the drip  then he can move to the floor.  Will also start a sliding scale because his HBA1C is 8.7.  Hopefully he can control diet with diet which will also be changed.    1/29  - Awake, alert, wife present. Updated on condition and plan. All questions answered. Off Cardene gtt this 2 am. BP still running a little high. Increase Toprol XL to 50mg daily. Cont ASA/Plavix. Add Metformin for DM2. Downgrade to floor.         OBJECTIVE:     Vital Signs (Most Recent)  Temp: 98.4 °F (36.9 °C) (01/29/23 1930)  Pulse: 75 (01/29/23 2000)  Resp: 12 (01/29/23 2000)  BP: (!) 160/92 (01/29/23 2000)  SpO2: 97 % (01/29/23 2000)      Vital Signs Range (Last 24H):  Temp:  [97 °F (36.1 °C)-98.4 °F (36.9 °C)]   Pulse:  []   Resp:  [10-25]   BP: (115-179)/()   SpO2:  [94 %-99 %]  .vi    I & O (Last 24H):  Intake/Output Summary (Last 24 hours) at 1/29/2023 2219  Last data filed at 1/29/2023 1756  Gross per 24 hour   Intake 200 ml   Output 1600 ml   Net -1400 ml         Scheduled Meds:   aspirin  324 mg Oral Once    clopidogreL  75 mg Oral Daily    lisinopriL  20 mg Oral Daily    [START ON 1/30/2023] metoprolol succinate  50 mg Oral Daily    mupirocin   Nasal BID     Continuous Infusions:  PRN Meds:   dextrose 10%    dextrose 10%    glucagon (human recombinant)    glucose    glucose    insulin aspart U-100    LORazepam    ondansetron    sodium chloride 0.9%      Lines/Drains:   Lines/Drains/Airways       None                       Physical Exam     Recent Labs   Lab 01/25/23  0743 01/25/23  0749   WBC 7.8  --    HGB 14.2  --    HCT 44.4  --      --    MCV 92.1  --    PTT  --  26.0   INR  --  1.10        Recent Labs   Lab 01/25/23  0748 01/28/23  0313    138   K 4.4 4.5   CHLORIDE 105 107   CO2 24 22   BUN 13.0 26.0*   CREATININE 0.92 0.97   GLUCOSE 229* 207*   MG  --  2.20   CALCIUM 9.2 9.7   ALBUMIN 3.9 3.5   BILITOT 1.4 1.2   ALKPHOS 108 108   AST 20 21   ALT 30 26       Lab Results   Component Value Date    HGBA1C 8.7 (H)  01/28/2023        Echo Saline Bubble? No  · The left ventricle is mildly enlarged with moderate concentric   hypertrophy and severely decreased systolic function.  · The estimated ejection fraction is 26%.  · Grade III left ventricular diastolic dysfunction.  · Mild tricuspid regurgitation.  · Mild mitral regurgitation.  · Mild right ventricular enlargement with mildly reduced right ventricular   systolic function.  · Moderate left atrial enlargement.  · Mild right atrial enlargement.  · Elevated central venous pressure (15 mmHg).  · The estimated PA systolic pressure is 47 mmHg.  · There is pulmonary hypertension.     MRI Brain Without Contrast  Narrative: EXAMINATION:  MRI BRAIN WITH CONTRAST:    CLINICAL HISTORY:  , Stroke, follow up;    COMPARISON:  None available    FINDINGS:  Serial axial,coronal, and sagittal 1.5 Carina images were obtained of the brain using T1 and T2- weighted techniques the administration of IV contrast. Ventricles, cisterns, and sulci are mildly prominent size.  There is no evidence of midline shift, mass effect, or abnormal extra-axial fluid collections.  Flow void is noted to the superior sagittal sinus and visualized intracranial vessels on T2 sequence imaging.  The right vertebral artery slightly dominant.  There is minimal scattered mucosal thickening at the ethmoid sinuses.  Orbital globes are symmetric in size shape and signal intensity.  Cerebellar tonsils extend caudally to the level of the foramen magnum.  There is focal abnormal somewhat bilobed signal intensity at the posterior left corona radiata and superior left thalamus on diffusion-weighted imaging compatible with a focus of acute ischemia measuring approximately 2 x 1 cm.  No other focus of abnormal signal intensity is identified on diffusion weighted imaging.  There is mild motion artifact.  Scattered small foci of abnormal signal intensity are evident at the periventricular and subcortical white matter on FLAIR sequence  imaging.  Impression: 1. Mild motion artifact  2. Somewhat bilobed focus of abnormal signal intensity at the posterior left corona radiata/superior left thalamus compatible with a focus of acute ischemia measuring approximately 2 x 1 cm  3. Generalized cerebral and cerebellar atrophy  4. Scattered foci of abnormal signal intensity at the periventricular and subcortical white matter suspicious for foci of ischemia versus gliosis    Electronically signed by: Andres Romero  Date:    01/25/2023  Time:    10:00  US Carotid Bilateral  Narrative: EXAMINATION:  US CAROTID BILATERAL    CLINICAL HISTORY:  , Cerebral infarction, unspecified.    COMPARISON:  None available    FINDINGS:  Real-time imaging was performed through the right and left carotid arteries using duplex Doppler imaging. NASCET utilized. Mild scattered plaque formation at the carotid bulbs and proximal internal are carotid arteries bilaterally with no significant stenosis identified.  Antegrade flow is evident within the vertebral arteries bilaterally.    MEASUREMENTS:    Right Systolic Velocities(cm/s):    Internal Carotid: 65.3 cm/second    Common Carotid: 78.1 cm/second    Right IC:CC Ratio: 0.8    Left Systolic Velocities(cm/s):    Internal Carotid: 77.9 cm/second    Common Carotid: 80.1 cm/second    Left IC:CC Ratio: 1.0  Impression: 1. No significant localized atherosclerotic plaque formation or stenosis noted to the visualized portions of the carotid arteries bilaterally.    Electronically signed by: Andres Romero  Date:    01/25/2023  Time:    09:49  X-Ray Chest AP Portable  Narrative: EXAMINATION:  XR CHEST AP PORTABLE    CLINICAL HISTORY:  Stroke;, .    COMPARISON:  None available    FINDINGS:  An AP view or more reveals the heart to be mildly enlarged.  The trachea is midline.  Pulmonary vasculature is prominent.  No consolidative infiltrate or effusion is seen.  Degenerative changes are noted to the thoracic spine.  Impression: 1. Mild  cardiomegaly  2. Prominent pulmonary vasculature  3. Thoracic spondylosis    Electronically signed by: Andres Romero  Date:    01/25/2023  Time:    09:25  CT Head Without Contrast  Narrative: EXAMINATION:  CT of the head without contrast    CLINICAL HISTORY:  Right-sided weakness, numbness    TECHNIQUE:  Routine CT of the head was performed without intravenous contrast    Total DLP: 1787 mGy.cm    Automatic exposure control was utilized to reduce the patient's dose    COMPARISON:  None    FINDINGS:  There is no acute intracranial hemorrhage, midline shift, mass-effect, or extra-axial collection.  The ventricular system is normal in size and configuration.  There are mild patchy areas of decreased attenuation in the periventricular, deep, subcortical white matter, while nonspecific, most commonly sequela of chronic microvascular ischemia.  No sulcal effacement.  Gray-white matter differentiation is preserved.  Visualized osseous structures are intact.  Visualized paranasal sinuses and mastoid air cells are clear  Impression: No acute intracranial abnormality.    Supratentorial white matter changes, while nonspecific, most likely sequela of chronic microvascular ischemia.    Electronically signed by: Sunil Coffman MD  Date:    01/25/2023  Time:    07:53           ASSESSMENT/PLAN:       CVA (cerebral vascular accident)    Primary hypertension    Acute combined systolic and diastolic congestive heart failure    Hyperglycemia       - off Cardene gtt  - incr Toprol XL to 50mg  - cont Lisionopril  - cont ASA/Plavix  - add Metformin  - PT/OT        VTE Risk Mitigation (From admission, onward)           Ordered     IP VTE HIGH RISK PATIENT  Once         01/25/23 1649     Place sequential compression device  Until discontinued         01/25/23 1649                           Woodrow Darby MD  Hospital Medicine [facility]

## 2023-01-31 LAB — POCT GLUCOSE: 144 MG/DL (ref 70–110)

## 2023-01-31 PROCEDURE — 63600175 PHARM REV CODE 636 W HCPCS: Performed by: INTERNAL MEDICINE

## 2023-01-31 PROCEDURE — 21400001 HC TELEMETRY ROOM

## 2023-01-31 PROCEDURE — 97112 NEUROMUSCULAR REEDUCATION: CPT

## 2023-01-31 PROCEDURE — 25000003 PHARM REV CODE 250: Performed by: INTERNAL MEDICINE

## 2023-01-31 PROCEDURE — 92507 TX SP LANG VOICE COMM INDIV: CPT

## 2023-01-31 PROCEDURE — 97530 THERAPEUTIC ACTIVITIES: CPT

## 2023-01-31 PROCEDURE — 25000003 PHARM REV CODE 250: Performed by: NURSE PRACTITIONER

## 2023-01-31 PROCEDURE — 94761 N-INVAS EAR/PLS OXIMETRY MLT: CPT

## 2023-01-31 PROCEDURE — 97535 SELF CARE MNGMENT TRAINING: CPT

## 2023-01-31 PROCEDURE — 11000001 HC ACUTE MED/SURG PRIVATE ROOM

## 2023-01-31 RX ADMIN — METOPROLOL SUCCINATE 50 MG: 50 TABLET, EXTENDED RELEASE ORAL at 09:01

## 2023-01-31 RX ADMIN — CLOPIDOGREL BISULFATE 75 MG: 75 TABLET ORAL at 09:01

## 2023-01-31 RX ADMIN — INSULIN ASPART 2 UNITS: 100 INJECTION, SOLUTION INTRAVENOUS; SUBCUTANEOUS at 12:01

## 2023-01-31 RX ADMIN — LISINOPRIL 40 MG: 20 TABLET ORAL at 09:01

## 2023-01-31 NOTE — PT/OT/SLP PROGRESS
Physical Therapy Treatment    Patient Name:  Manpreet Josue   MRN:  [id number]    Recommendations:     Discharge Recommendations: rehabilitation facility  Discharge Equipment Recommendations: walker, rolling  Barriers to discharge: None    Assessment:     Manpreet Josue is a 52 y.o. male admitted with a medical diagnosis of CVA (cerebral vascular accident).  He presents with the following impairments/functional limitations: weakness, impaired endurance, impaired functional mobility, gait instability, impaired balance, decreased lower extremity function, decreased upper extremity function, decreased safety awareness, impaired self care skills, impaired coordination, decreased ROM, impaired sensation.    Pt completed multiple standing trials to RW from chair. He is flaccid on the R side, so he needs assistance to hold his hand onto the walker. He stood with most of his weight through the LLE with Mod A. Weight shifting side to side was initiated with fair tolerance. He stated multiple times that it felt like his RLE was going to buckle. Therapist suggested that a session with his wife there to be properly trained on transfers and care for the affected side throughout his recovery would be beneficial for a safe return home. He stayed up in chair for a few hours then returned to bed with Max A from therapist.    Rehab Prognosis: Good; patient would benefit from acute skilled PT services to address these deficits and reach maximum level of function.    Recent Surgery: * No surgery found *      Plan:     During this hospitalization, patient to be seen daily to address the identified rehab impairments via gait training, therapeutic activities, therapeutic exercises and progress toward the following goals:    Plan of Care Expires:  02/23/23    Subjective     Chief Complaint: weakness to R  Patient/Family Comments/goals: to be able to get PT before returning home  Pain/Comfort:         Objective:     Communicated with patient  prior to session.  Patient found HOB elevated with   upon PT entry to room.     General Precautions: Standard, fall  Orthopedic Precautions:    Braces:    Respiratory Status: Room air     Functional Mobility:  Bed Mobility:     Supine to Sit: moderate assistance  Transfers:     Sit to Stand:  moderate assistance and maximal assistance with rolling walker  Bed to Chair: moderate assistance and maximal assistance with  no AD  using  Stand Pivot  Balance: min-CGA in sitting, mod A in supported standing      AM-PAC 6 CLICK MOBILITY          Treatment & Education:  See above    Patient left up in chair with all lines intact and call button in reach..    GOALS:   Multidisciplinary Problems       Physical Therapy Goals          Problem: Physical Therapy    Goal Priority Disciplines Outcome Goal Variances Interventions   Physical Therapy Goal     PT, PT/OT Ongoing, Progressing     Description: Goals to be met by: 23     Patient will increase functional independence with mobility by performin. Supine to sit with Stand-by Assistance  2. Sit to stand transfer with Stand-by Assistance  3. Gait  x 50 feet with Contact Guard Assistance using Rolling Walker.                          Time Tracking:     PT Received On: 23  PT Start Time: 1030     PT Stop Time: 1055  PT Total Time (min): 30 min     Billable Minutes: Therapeutic Activity 25    Treatment Type: Evaluation  PT/PTA: PT           2023

## 2023-01-31 NOTE — PROGRESS NOTES
[facility]  Cardiology  Progress Note    Patient Name: Manpreet Josue  MRN: [id number]  Admission Date: 1/25/2023  Hospital Length of Stay: 6 days  Code Status: Full Code   Attending Provider: Woodrow Darby MD   Consulting Provider: GENE Duke  Primary Care Physician: Primary Doctor No  Principal Problem:CVA (cerebral vascular accident)    Patient information was obtained from patient, ER records, and primary team.       Subjective:     Chief Complaint: Right sided weakness     HPI: Patient is a 51 yo male unknown to our services.  His symptoms started yesterday morning with some disorientation and difficulty speaking.  He also had some weakness in his right hand and right leg.  He decided that he should come into the ER for an evaluation.  Upon arrival systolic blood pressure was 220/125, EKG was done which showed a normal sinus rhythm and some lateral T-wave inversions.  Lab work showed a minimally elevated troponin that tapered down with the 2nd set.  Initial CT scan was negative.  Chest x-ray was consistent with some vascular congestion.  Carotid ultrasound was negative.  MRI did show left thalamic infarct.  He was seen by vascular Neurology who loaded him with Plavix aspirin and high-intensity statin.      1/26/23: Patient sitting up in chair eating lunch without distress. He denies any CP or SOB. He remains on cardene gtt for BP.   1/27/23: Patient in bed without distress. He denies any CP or SOB.  1/30/23: Patient resting in bed without distress. He is off Cardene gtt. He denies any CP or SOB.   1/31/23: Patient working with PT. He denies any complaints.       No current facility-administered medications on file prior to encounter.     No current outpatient medications on file prior to encounter.     Tobacco Use    Smoking status: Not on file    Smokeless tobacco: Not on file   Substance and Sexual Activity    Alcohol use: Not on file    Drug use: Not on file    Sexual activity: Not  on file     Review of Systems   Constitutional: Negative.   Cardiovascular: Negative.    Respiratory: Negative.     Skin: Negative.    Musculoskeletal:  Positive for muscle weakness.   Gastrointestinal: Negative.    Neurological:         Right upper and lower extremity weakness, right facial droop    Psychiatric/Behavioral: Negative.     Allergic/Immunologic: Negative.    Objective:     Vital Signs (Most Recent):  Temp: 97.5 °F (36.4 °C) (01/31/23 1155)  Pulse: 77 (01/31/23 1155)  Resp: 19 (01/31/23 1155)  BP: (!) 158/84 (01/31/23 1155)  SpO2: 98 % (01/31/23 1155)   Vital Signs (24h Range):  Temp:  [97 °F (36.1 °C)-98.4 °F (36.9 °C)] 97.5 °F (36.4 °C)  Pulse:  [64-93] 77  Resp:  [10-22] 19  SpO2:  [91 %-98 %] 98 %  BP: (133-184)/() 158/84     Weight: 114.3 kg (251 lb 15.8 oz)  Body mass index is 37.21 kg/m².    SpO2: 98 %         Intake/Output Summary (Last 24 hours) at 1/31/2023 1255  Last data filed at 1/31/2023 1228  Gross per 24 hour   Intake 220 ml   Output 1175 ml   Net -955 ml         Lines/Drains/Airways       Peripheral Intravenous Line  Duration                  Peripheral IV - Single Lumen Anterior;Distal;Left Upper Arm -- days                    Physical Exam  Constitutional:       Appearance: Normal appearance. He is obese.   Eyes:      Extraocular Movements: Extraocular movements intact.   Cardiovascular:      Rate and Rhythm: Normal rate and regular rhythm.   Pulmonary:      Effort: Pulmonary effort is normal.      Breath sounds: Normal breath sounds.   Musculoskeletal:      Comments: R sided hemiparesis   Skin:     General: Skin is warm and dry.   Neurological:      Mental Status: He is alert.      Motor: Weakness present.   Psychiatric:         Mood and Affect: Mood normal.         Behavior: Behavior normal.       Significant Imaging:   Echocardiogram 1/26/23:  The left ventricle is mildly enlarged with moderate concentric hypertrophy and severely decreased systolic function.  The estimated  ejection fraction is 26%.  Grade III left ventricular diastolic dysfunction.  Mild tricuspid regurgitation.  Mild mitral regurgitation.  Mild right ventricular enlargement with mildly reduced right ventricular systolic function.  Moderate left atrial enlargement.  Mild right atrial enlargement.  Elevated central venous pressure (15 mmHg).  The estimated PA systolic pressure is 47 mmHg.  There is pulmonary hypertension.    Assessment and Plan:       1. Acute CVA w/ right sided hemiparesis     - Continue aspirin and plavix     - Monitor Tele     - Continue therapy  2. Cardiomyopathy     - Continue Toprol 50 mg daily and Lisinopril 40 mg daily     - LifeVest evaluation in progress     - Ischemic evaluation as outpatient     - Spoke with  and they will set up with Cardiology at [facility] after discharge  3. HTN Urgency      - Cardene gtt off     - Continue BB and ACEi. Up titrate as needed  4. Elevated Troponin     - Patient denies CP and trend has remained flat     - Type II MI s/t CVA and hypertensive urgency       Please call with any further questions    Leeroy Combs, GENE  Cardiology   [facility] - Emergency Dept

## 2023-01-31 NOTE — PT/OT/SLP PROGRESS
Occupational Therapy   Treatment    Name: Manpreet Josue  MRN: [id number]  Admitting Diagnosis:  CVA (cerebral vascular accident)       Recommendations:     Discharge Recommendations: rehabilitation facility  Discharge Equipment Recommendations:     Barriers to discharge:       Assessment:     Manpreet Josue is a 52 y.o. male with a medical diagnosis of CVA (cerebral vascular accident).  He presents with performance deficits affecting function are weakness, impaired endurance, impaired self care skills.   Pt with good motivation to tasks today and has been doing self PROM while in bed. Pt able to transition supine to sit with CGA and Vcs to use adaptive techniques. Pt and OT worked on NDT ac to RUE to increase trace movements in shoulder and hand. Noted trace movements in pronation today.    Rehab Prognosis:  Good; patient would benefit from acute skilled OT services to address these deficits and reach maximum level of function.       Plan:     Patient to be seen 3 x/week to address the above listed problems via self-care/home management, therapeutic activities, therapeutic exercises, neuromuscular re-education  Plan of Care Expires:    Plan of Care Reviewed with: patient, spouse, son    Subjective     Pain/Comfort:  Pain Rating 1: 0/10    Objective:     Communicated with: nursing prior to session.  Patient found HOB elevated with   upon OT entry to room.    General Precautions: Standard,      Orthopedic Precautions:   Braces:    Respiratory Status: Room air     Occupational Performance:     Bed Mobility:    Patient completed Rolling/Turning to Left with  stand by assistance and contact guard assistance  Patient completed Rolling/Turning to Right with stand by assistance and contact guard assistance  Patient completed Supine to Sit with contact guard assistance and minimum assistance     Functional Mobility/Transfers:  Patient completed Bed <> Chair Transfer using Stand Pivot technique with moderate assistance with  hand-held assist      [facility] 6 Click ADL:      Treatment & Education:  See above    Patient left up in chair with all lines intact and call button in reach    GOALS:   Multidisciplinary Problems       Occupational Therapy Goals          Problem: Occupational Therapy    Goal Priority Disciplines Outcome Interventions   Occupational Therapy Goal     OT, PT/OT Ongoing, Progressing    Description: Goals to be met by: 2/16/23     Patient will increase functional independence with ADLs by performing:    Feeding with Set-up Assistance.  Grooming while seated with Set-up Assistance and use of adaptive techniques  Sitting at edge of bed x20 minutes with Contact Guard Assistance.  Increased functional strength to 4/5 for increased (I) with ADLs with LUE; RUE AROM to increase as able.                         Time Tracking:     OT Date of Treatment: 01/31/23  OT Start Time: 0800  OT Stop Time: 0825  OT Total Time (min): 25 min    Billable Minutes:Self Care/Home Management 10  Neuromuscular Re-education 15    OT/SAUNDRA: OT          1/31/2023

## 2023-01-31 NOTE — PT/OT/SLP PROGRESS
Speech Language Pathology Treatment    Patient Name:  Manpreet Josue   MRN:  [id number]  Admitting Diagnosis: CVA (cerebral vascular accident)    Recommendations:                 General Recommendations:  Speech/language therapy  Diet recommendations:  Soft & Bite Sized Diet - IDDSI Level 6, Liquid Diet Level: Thin liquids - IDDSI Level 0   General Precautions: Standard,    Communication strategies:   Increased volume w/over-articulation    Subjective     Pt alert in bed and compliant w/ST. Pt's wife present and she was upset about possible d/c home. Pt's wife reported  Pt's Medicaid was under review, and requested ST notify nursing/case manger before she contacted a . ST notified charge nurse of Pt's concerns.  was gone for the day.       Objective:     Skilled intervention focused on Dysarthria tx    Assessment:     Manpreet Josue is a 52 y.o. male with an SLP diagnosis of Dysarthria.  He presents with mild speech intelli. Pt reported he utilized HEP and loud speech. Pt and ST participated in simple conversational turn-taking and the Pt was aware of increasing volume which increased his speech intelli. Pt edu on loud and over-articulated speech prior to saying tongue twisters. Pt recited tongue twisters w/30% artic distortions but 100% speech intelli. Pt completed labial protrusion/retraction x60 and labial lateralization x60.  Pt edu to cont w/use of loud speech and HEP while in his room. Pt verbalized agreement.     Goals:   Multidisciplinary Problems       SLP Goals          Problem: SLP    Goal Priority Disciplines Outcome   SLP Goal     SLP    Description: Pt's oral motor strength and ROM will increase as noted in ROM exercises and speech exercises on R side.   Pt's speech will increase to 90% w/o the visual of Pt's articulators and w/environmental noises.                                 Plan:     Patient to be seen:  3 x/week   Plan of Care expires:  02/24/23  Plan of Care reviewed with:   patient and nursing  SLP Follow-Up:  Yes        Time Tracking:     SLP Treatment Date:   01/31/23  Speech Start Time:  1440  Speech Stop Time:  1525     Speech Total Time (min):  45 min    Billable Minutes: Speech Therapy Individual      01/31/2023

## 2023-02-01 LAB
POCT GLUCOSE: 141 MG/DL (ref 70–110)
POCT GLUCOSE: 166 MG/DL (ref 70–110)

## 2023-02-01 PROCEDURE — 25000003 PHARM REV CODE 250: Performed by: INTERNAL MEDICINE

## 2023-02-01 PROCEDURE — 25000003 PHARM REV CODE 250: Performed by: NURSE PRACTITIONER

## 2023-02-01 PROCEDURE — 94761 N-INVAS EAR/PLS OXIMETRY MLT: CPT

## 2023-02-01 PROCEDURE — 11000001 HC ACUTE MED/SURG PRIVATE ROOM

## 2023-02-01 PROCEDURE — 21400001 HC TELEMETRY ROOM

## 2023-02-01 PROCEDURE — 97530 THERAPEUTIC ACTIVITIES: CPT

## 2023-02-01 RX ADMIN — CLOPIDOGREL BISULFATE 75 MG: 75 TABLET ORAL at 08:02

## 2023-02-01 RX ADMIN — LORAZEPAM 1 MG: 1 TABLET ORAL at 08:02

## 2023-02-01 RX ADMIN — LISINOPRIL 40 MG: 20 TABLET ORAL at 08:02

## 2023-02-01 RX ADMIN — METOPROLOL SUCCINATE 50 MG: 50 TABLET, EXTENDED RELEASE ORAL at 08:02

## 2023-02-01 NOTE — PLAN OF CARE
Messaged [employer] to see if they have an indegent program for vest or if they arrange payment plans.  She stated that they do have an assistant program and she asked that I forward pt info and order to her and she will evaluate him for it and let me know.

## 2023-02-01 NOTE — PLAN OF CARE
Wife is here.  She states that he has been approved for medicaid and that she should have medicaid number tomorrow. She will let me know.  She asked that referral be sent to [facility] and [place], that her sister works at [facility] [employer].  Referrals sent.

## 2023-02-01 NOTE — PLAN OF CARE
Spoke to wife on phone about d/c planning.  She stated that she is working on a new application for medicaid and that someone from the Medicaid office is assisting her.  I explained to her that this may take months and that we cannot keep her  here until that comes through, that we need to start educating and teaching her and her fly how to take care of her  at home.  She agreed.  I told her that we will get PT to contact her and schedule times for her to be here for teaching, she agreed.    She inquired about lifevest and asked that if they cannot afford to pay for it, what will happen to her , will he be discharged and if something happens at home with his heart, he will just die?  I told her that I will contact the person who arranges lifevest and see what they can do and that they will reach out to her.   I also told her that I will check on medical equipment once PT works with her and patient and get prices and give her resources where she may find some for free at places that have used DME.  She thanked me.    Order placed for PT to start fly training sessions for plan of d/c home..

## 2023-02-01 NOTE — PT/OT/SLP PROGRESS
Physical Therapy Treatment    Patient Name:  Manpreet Josue   MRN:  [id number]    Recommendations:     Discharge Recommendations: rehabilitation facility  Discharge Equipment Recommendations: walker, rolling  Barriers to discharge: None    Assessment:     Manpreet Josue is a 52 y.o. male admitted with a medical diagnosis of CVA (cerebral vascular accident).  He presents with the following impairments/functional limitations: weakness, impaired endurance, impaired functional mobility, gait instability, impaired balance, decreased lower extremity function, decreased upper extremity function, decreased safety awareness, impaired self care skills, impaired coordination, decreased ROM, impaired sensation.    Pt participated in transfer training with wheelchair today is preporation for possibly returning home vs rehab. Reviewed safety with wheelchair mechanics and positioning prior to transfers. He required min-mod A for transfer from bed to and from wheelchair. He was able to self propel his chair, requiring assistance occasionally for turns and navigating tight spaces. Standing tasks and weight shifting and weight bearing also performed today.     Rehab Prognosis: Good; patient would benefit from acute skilled PT services to address these deficits and reach maximum level of function.    Recent Surgery: * No surgery found *      Plan:     During this hospitalization, patient to be seen daily to address the identified rehab impairments via gait training, therapeutic activities, therapeutic exercises and progress toward the following goals:    Plan of Care Expires:  02/23/23    Subjective     Chief Complaint: weakness to R  Patient/Family Comments/goals: to be able to get PT before returning home  Pain/Comfort:         Objective:     Communicated with patient prior to session.  Patient found HOB elevated with telemetry, pulse ox (continuous), peripheral IV, PureWick upon PT entry to room.     General Precautions: Standard,  fall  Orthopedic Precautions:    Braces:    Respiratory Status: Room air     Functional Mobility:  Bed Mobility:     Supine to Sit: minimum assistance  Sit to Supine: moderate assistance  Transfers:     Sit to Stand:  moderate assistance with rolling walker  Bed to Chair: minimum assistance and moderate assistance with  no AD  using  Stand Pivot  Balance: min-CGA in sitting, mod A in supported standing  Wheelchair Propulsion:  Pt propelled Standard wheelchair x 80 feet on Level tile with  Left upper extremity and Left lower extremity with Stand-by Assistance and Minimal Assistance.       AM-PAC 6 CLICK MOBILITY          Treatment & Education:  See above    Patient left up in chair with all lines intact and call button in reach..    GOALS:   Multidisciplinary Problems       Physical Therapy Goals          Problem: Physical Therapy    Goal Priority Disciplines Outcome Goal Variances Interventions   Physical Therapy Goal     PT, PT/OT Ongoing, Progressing     Description: Goals to be met by: 23     Patient will increase functional independence with mobility by performin. Supine to sit with Stand-by Assistance  2. Sit to stand transfer with Stand-by Assistance  3. Gait  x 50 feet with Contact Guard Assistance using Rolling Walker.                          Time Tracking:     PT Received On: 23  PT Start Time: 0930     PT Stop Time: 1000  PT Total Time (min): 30 min     Billable Minutes: Therapeutic Activity 30    Treatment Type: Treatment  PT/PTA: PTA           2023

## 2023-02-02 LAB
POCT GLUCOSE: 159 MG/DL (ref 70–110)
POCT GLUCOSE: 176 MG/DL (ref 70–110)
POCT GLUCOSE: 186 MG/DL (ref 70–110)

## 2023-02-02 PROCEDURE — 97535 SELF CARE MNGMENT TRAINING: CPT

## 2023-02-02 PROCEDURE — 94761 N-INVAS EAR/PLS OXIMETRY MLT: CPT

## 2023-02-02 PROCEDURE — 25000003 PHARM REV CODE 250: Performed by: NURSE PRACTITIONER

## 2023-02-02 PROCEDURE — 21400001 HC TELEMETRY ROOM

## 2023-02-02 PROCEDURE — 25000003 PHARM REV CODE 250: Performed by: INTERNAL MEDICINE

## 2023-02-02 PROCEDURE — 92507 TX SP LANG VOICE COMM INDIV: CPT

## 2023-02-02 PROCEDURE — 97530 THERAPEUTIC ACTIVITIES: CPT

## 2023-02-02 RX ADMIN — LISINOPRIL 40 MG: 20 TABLET ORAL at 08:02

## 2023-02-02 RX ADMIN — METOPROLOL SUCCINATE 50 MG: 50 TABLET, EXTENDED RELEASE ORAL at 08:02

## 2023-02-02 RX ADMIN — CLOPIDOGREL BISULFATE 75 MG: 75 TABLET ORAL at 08:02

## 2023-02-02 NOTE — PT/OT/SLP PROGRESS
Physical Therapy Treatment    Patient Name:  Manpreet Josue   MRN:  [id number]    Recommendations:     Discharge Recommendations: rehabilitation facility  Discharge Equipment Recommendations: walker, rolling  Barriers to discharge: None    Assessment:     Manpreet Josue is a 52 y.o. male admitted with a medical diagnosis of CVA (cerebral vascular accident).  He presents with the following impairments/functional limitations: weakness, impaired endurance, impaired functional mobility, gait instability, impaired balance, decreased lower extremity function, decreased upper extremity function, decreased safety awareness, impaired self care skills, impaired coordination, decreased ROM, impaired sensation.     Pt seen first in the morning for transfer training. He demonstrates proper technique and safety awareness during all transfers from various surfaces. He tends to have most success when pulling up to stand from a grab bar or railing situated in front of him. He demonstrated good static standing balance to be cleaned after bm.     This afternoon pt's wife was present for transfer and home training. Wife was given safety training on how to properly assist during bed mobility and transfers from bed to chair and chair to bed. Reviewed safety with wheelchair mechanics and positioning prior to transfers. Therapist had pt's wife brenda gait belt and she properly assisted pt in a chair to bed transfer with proper blocking of the affected side.     Rehab Prognosis: Good; patient would benefit from acute skilled PT services to address these deficits and reach maximum level of function.    Recent Surgery: * No surgery found *      Plan:     During this hospitalization, patient to be seen daily to address the identified rehab impairments via gait training, therapeutic activities, therapeutic exercises and progress toward the following goals:    Plan of Care Expires:  02/23/23    Subjective     Chief Complaint: weakness to  R  Patient/Family Comments/goals: to be able to get PT before returning home  Pain/Comfort:         Objective:     Communicated with patient prior to session.  Patient found HOB elevated with   upon PT entry to room.     General Precautions: Standard, fall  Orthopedic Precautions:    Braces:    Respiratory Status: Room air     Functional Mobility:  Bed Mobility:     Supine to Sit: minimum assistance  Sit to Supine: minimum assistance  Transfers:     Sit to Stand:  moderate assistance with rolling walker  Bed to Chair: minimum assistance with  no AD  using  Squat Pivot  Chair to mat: minimum assistance with  no AD  using  Squat Pivot  Toilet Transfer: minimum assistance with  no AD  using  Squat Pivot  Balance: min-CGA in sitting, min A in supported standing      AM-PAC 6 CLICK MOBILITY          Treatment & Education:  See above    Patient left up in chair with all lines intact and call button in reach..    GOALS:   Multidisciplinary Problems       Physical Therapy Goals          Problem: Physical Therapy    Goal Priority Disciplines Outcome Goal Variances Interventions   Physical Therapy Goal     PT, PT/OT Ongoing, Progressing     Description: Goals to be met by: 23     Patient will increase functional independence with mobility by performin. Supine to sit with Stand-by Assistance  2. Sit to stand transfer with Stand-by Assistance  3. Gait  x 50 feet with Contact Guard Assistance using Rolling Walker.                          Time Tracking:     PT Received On: 23  PT Start Time: 1047     PT Stop Time: 1127  PT Total Time (min): 40 min     Billable Minutes: Therapeutic Activity 40    Treatment Type: Evaluation  PT/PTA: PT           2023

## 2023-02-02 NOTE — PT/OT/SLP PROGRESS
Speech Language Pathology Treatment    Patient Name:  Manpreet Josue   MRN:  [id number]  Admitting Diagnosis: CVA (cerebral vascular accident)    Recommendations:                 General Recommendations:  Speech/language therapy  Diet recommendations:  Soft & Bite Sized Diet - IDDSI Level 6, Liquid Diet Level: Thin liquids - IDDSI Level 0   General Precautions: Standard,    Communication strategies:   Increased volume w/over-articulation    Subjective     Pt alert in chair and compliant w/ST.   Objective:     Skilled intervention focused on Dysarthria tx    Assessment:     Manpreet Josue is a 52 y.o. male with an SLP diagnosis of Dysarthria.  He presents with mild speech intelli deficits. Pt reported he utilizes HEP and loud speech. Pt and ST participated in simple conversational turn-taking and the Pt was aware of increasing volume which increased his speech intelli.  Pt presented x5 words that he had a difficult time pronouncing, but Pt was able to self-correct and verbalize those words w/intelligibility. Pt completed labial protrusion and retraction exercises x30. Pt reported he has noted less biting of his tongue when chewing. Pt edu to cont HEP and Pt verbalized agreement.     Goals:   Multidisciplinary Problems       SLP Goals          Problem: SLP    Goal Priority Disciplines Outcome   SLP Goal     SLP    Description: Pt's oral motor strength and ROM will increase as noted in ROM exercises and speech exercises on R side.   Pt's speech will increase to 90% w/o the visual of Pt's articulators and w/environmental noises.                                 Plan:     Patient to be seen:  3 x/week   Plan of Care expires:  02/24/23  Plan of Care reviewed with:  patient and nursing  SLP Follow-Up:  Yes        Time Tracking:     SLP Treatment Date:   02/02/23  Speech Start Time:  1300  Speech Stop Time:  1400     Speech Total Time (min):  60 min    Billable Minutes: Speech Therapy Individual      02/02/2023

## 2023-02-02 NOTE — PLAN OF CARE
Spoke to Paulette in Patient Accounts this morning and informed her about what wife told me about medicaid. She called me back and stated that she did check w/Medicaid office and yes, pt has been approved for regular medicaid.  She stated that his number should be loaded into the system within a couple of days and that she will let me know as soon as she gets it loaded into epic.

## 2023-02-02 NOTE — PT/OT/SLP PROGRESS
Occupational Therapy   Treatment    Name: Manpreet Josue  MRN: [id number]  Admitting Diagnosis:  CVA (cerebral vascular accident)       Recommendations:     Discharge Recommendations: rehabilitation facility  Discharge Equipment Recommendations:     Barriers to discharge:       Assessment:     Manpreet Josue is a 52 y.o. male with a medical diagnosis of CVA (cerebral vascular accident).  He presents with Performance deficits affecting function are weakness, impaired endurance, impaired self care skills.   Pt educated extensively on ector dressing techniques for UED, LED and toileting. Also educated on adaptive dressing equipment including shoe horn and elastic shoe laces. Pt verbalizes understanding and return demo dressing techniques well. Pt would benefit form con't training to fully be (I) with techniques. Pt was given handout with all above instructions.     Rehab Prognosis:  Good; patient would benefit from acute skilled OT services to address these deficits and reach maximum level of function.       Plan:     Patient to be seen 3 x/week to address the above listed problems via self-care/home management, therapeutic activities, therapeutic exercises, neuromuscular re-education  Plan of Care Expires:    Plan of Care Reviewed with: patient, spouse, son    Subjective     Pain/Comfort:       Objective:     Communicated with: nuris prior to session.  Patient found semisupine with   upon OT entry to room.    General Precautions: Standard,      Orthopedic Precautions:   Braces:    Respiratory Status: Room air     Occupational Performance:     Bed Mobility:    Patient completed Rolling/Turning to Left with  modified independence  Patient completed Rolling/Turning to Right with modified independence  Patient completed Supine to Sit with contact guard assistance  Patient completed Sit to Supine with contact guard assistance     Activities of Daily Living:  See above      [facility] 6 Click ADL:      Treatment & Education:  Kanchan  tx well.    Patient left HOB elevated with all lines intact and call button in reach    GOALS:   Multidisciplinary Problems       Occupational Therapy Goals          Problem: Occupational Therapy    Goal Priority Disciplines Outcome Interventions   Occupational Therapy Goal     OT, PT/OT Ongoing, Progressing    Description: Goals to be met by: 2/16/23     Patient will increase functional independence with ADLs by performing:    Feeding with Set-up Assistance.  Grooming while seated with Set-up Assistance and use of adaptive techniques  Sitting at edge of bed x20 minutes with Contact Guard Assistance.  Increased functional strength to 4/5 for increased (I) with ADLs with LUE; RUE AROM to increase as able.                         Time Tracking:     OT Date of Treatment: 02/02/23  OT Start Time: 0730  OT Stop Time: 0800  OT Total Time (min): 30 min    Billable Minutes:Self Care/Home Management 30    OT/SAUNDRA: OT          2/2/2023

## 2023-02-02 NOTE — PROGRESS NOTES
[facility] Medicine Progress Note    Patient Name: Manpreet Josue  Age: 52 y.o.   MRN: [id number]  Admission Date: 1/25/2023  7:08 AM   Patient Class: IP- Inpatient  Benefits: Payor: MEDICAID / Plan: PENDING MEDICAID / Product Type: Government /    Primary Care Provider: Primary Doctor No   Pharmacy:   [facility] - [place] - [address]  [address] [place] [address]  Phone: [phone] Fax: [phone]    Code Status: Full Code      Chief Complaint:   Chief Complaint   Patient presents with    Cerebrovascular Accident     Pt and wife report pt having slurred speech and rt sided weakness with difficulty walking. Rt sided facial droop noted. Symptom started yesterday am.        Admit Diagnosis:   CVA (cerebral vascular accident) [I63.9]  Stroke [I63.9]     HPI:  52 year old male with non significant medical history who presented to ED accompanied by his wife with complaints of worsening symptoms of slurred speech, right facial droop and right sided weakness. They admitted last known normal was Monday night prior to going to bed and noted symptoms on Tuesday but did not present for evaluation. He denies any positive sick contact. Denies fever, chills, N/V/D   *As documented in Admit H&P     SUBJECTIVE:     Follow-up:  CVA (cerebral vascular accident)    Hospital Coarse:  1/26/23-The patient is still on a Cardene drip.  Will start titrating meds tomorrow which would have allowed for the permissive HTN.  He still has deficit.  Cardiology also saw him for his CHF.  Will need CM involvement due to lack of insurance for the patient.  I did have a lengthy discussion with the wife.    1/27/23-Patient is doing well at this time.  He is still on a Cardene drip.  Will start adding additional oral meds to help with his BP.  Therapy is still working with him as well.    1/28/23-Patient is resting at this time.  His BP is much improved.  Will try top wean off the Cardene drip  today.  Once he is stable off the drip then he can move to the floor.  Will also start a sliding scale because his HBA1C is 8.7.  Hopefully he can control diet with diet which will also be changed.    1/29  - Awake, alert, wife present. Updated on condition and plan. All questions answered. Off Cardene gtt this 2 am. BP still running a little high. Increase Toprol XL to 50mg daily. Cont ASA/Plavix. Add Metformin for DM2. Downgrade to floor.    1/30  - No complaints. Feels well. Home soon.    1/31  - Feels well. No acute events.     2/1  - Cont with Right paresis. PT to teach wife how to take care of pt at home and DME recs.         OBJECTIVE:     Vital Signs (Most Recent)  Temp: 98 °F (36.7 °C) (02/01/23 0807)  Pulse: (!) 51 (02/01/23 1723)  Resp: 18 (01/31/23 2036)  BP: (!) 168/73 (02/01/23 1723)  SpO2: 97 % (02/01/23 1723)      Vital Signs Range (Last 24H):  Temp:  [97.5 °F (36.4 °C)-98.4 °F (36.9 °C)]   Pulse:  [51-78]   Resp:  [18]   BP: (129-168)/(73-92)   SpO2:  [95 %-97 %]  .vi    I & O (Last 24H):  Intake/Output Summary (Last 24 hours) at 2/1/2023 1930  Last data filed at 2/1/2023 0958  Gross per 24 hour   Intake --   Output 300 ml   Net -300 ml         Scheduled Meds:   aspirin  324 mg Oral Once    clopidogreL  75 mg Oral Daily    lisinopriL  40 mg Oral Daily    metoprolol succinate  50 mg Oral Daily     Continuous Infusions:  PRN Meds:   dextrose 10%    dextrose 10%    glucagon (human recombinant)    glucose    glucose    insulin aspart U-100    LORazepam    ondansetron    sodium chloride 0.9%      Lines/Drains:   Lines/Drains/Airways       None                       Physical Exam  Constitutional:       General: He is not in acute distress.  HENT:      Head: Normocephalic and atraumatic.   Cardiovascular:      Rate and Rhythm: Normal rate and regular rhythm.   Pulmonary:      Effort: Pulmonary effort is normal.      Breath sounds: Normal breath sounds.   Skin:     General: Skin is warm.   Neurological:       Mental Status: He is alert and oriented to person, place, and time.      Comments: RUE/RLE paresis, R facial droop            Recent Labs   Lab 01/28/23  0313      K 4.5   CHLORIDE 107   CO2 22   BUN 26.0*   CREATININE 0.97   GLUCOSE 207*   MG 2.20   CALCIUM 9.7   ALBUMIN 3.5   BILITOT 1.2   ALKPHOS 108   AST 21   ALT 26       Lab Results   Component Value Date    HGBA1C 8.7 (H) 01/28/2023        Echo Saline Bubble? No  · The left ventricle is mildly enlarged with moderate concentric   hypertrophy and severely decreased systolic function.  · The estimated ejection fraction is 26%.  · Grade III left ventricular diastolic dysfunction.  · Mild tricuspid regurgitation.  · Mild mitral regurgitation.  · Mild right ventricular enlargement with mildly reduced right ventricular   systolic function.  · Moderate left atrial enlargement.  · Mild right atrial enlargement.  · Elevated central venous pressure (15 mmHg).  · The estimated PA systolic pressure is 47 mmHg.  · There is pulmonary hypertension.     MRI Brain Without Contrast  Narrative: EXAMINATION:  MRI BRAIN WITH CONTRAST:    CLINICAL HISTORY:  , Stroke, follow up;    COMPARISON:  None available    FINDINGS:  Serial axial,coronal, and sagittal 1.5 Carina images were obtained of the brain using T1 and T2- weighted techniques the administration of IV contrast. Ventricles, cisterns, and sulci are mildly prominent size.  There is no evidence of midline shift, mass effect, or abnormal extra-axial fluid collections.  Flow void is noted to the superior sagittal sinus and visualized intracranial vessels on T2 sequence imaging.  The right vertebral artery slightly dominant.  There is minimal scattered mucosal thickening at the ethmoid sinuses.  Orbital globes are symmetric in size shape and signal intensity.  Cerebellar tonsils extend caudally to the level of the foramen magnum.  There is focal abnormal somewhat bilobed signal intensity at the posterior left corona  radiata and superior left thalamus on diffusion-weighted imaging compatible with a focus of acute ischemia measuring approximately 2 x 1 cm.  No other focus of abnormal signal intensity is identified on diffusion weighted imaging.  There is mild motion artifact.  Scattered small foci of abnormal signal intensity are evident at the periventricular and subcortical white matter on FLAIR sequence imaging.  Impression: 1. Mild motion artifact  2. Somewhat bilobed focus of abnormal signal intensity at the posterior left corona radiata/superior left thalamus compatible with a focus of acute ischemia measuring approximately 2 x 1 cm  3. Generalized cerebral and cerebellar atrophy  4. Scattered foci of abnormal signal intensity at the periventricular and subcortical white matter suspicious for foci of ischemia versus gliosis    Electronically signed by: Andres Romero  Date:    01/25/2023  Time:    10:00  US Carotid Bilateral  Narrative: EXAMINATION:  US CAROTID BILATERAL    CLINICAL HISTORY:  , Cerebral infarction, unspecified.    COMPARISON:  None available    FINDINGS:  Real-time imaging was performed through the right and left carotid arteries using duplex Doppler imaging. NASCET utilized. Mild scattered plaque formation at the carotid bulbs and proximal internal are carotid arteries bilaterally with no significant stenosis identified.  Antegrade flow is evident within the vertebral arteries bilaterally.    MEASUREMENTS:    Right Systolic Velocities(cm/s):    Internal Carotid: 65.3 cm/second    Common Carotid: 78.1 cm/second    Right IC:CC Ratio: 0.8    Left Systolic Velocities(cm/s):    Internal Carotid: 77.9 cm/second    Common Carotid: 80.1 cm/second    Left IC:CC Ratio: 1.0  Impression: 1. No significant localized atherosclerotic plaque formation or stenosis noted to the visualized portions of the carotid arteries bilaterally.    Electronically signed by: Andres Romero  Date:    01/25/2023  Time:    09:49  X-Ray Chest  AP Portable  Narrative: EXAMINATION:  XR CHEST AP PORTABLE    CLINICAL HISTORY:  Stroke;, .    COMPARISON:  None available    FINDINGS:  An AP view or more reveals the heart to be mildly enlarged.  The trachea is midline.  Pulmonary vasculature is prominent.  No consolidative infiltrate or effusion is seen.  Degenerative changes are noted to the thoracic spine.  Impression: 1. Mild cardiomegaly  2. Prominent pulmonary vasculature  3. Thoracic spondylosis    Electronically signed by: Andres Romero  Date:    01/25/2023  Time:    09:25  CT Head Without Contrast  Narrative: EXAMINATION:  CT of the head without contrast    CLINICAL HISTORY:  Right-sided weakness, numbness    TECHNIQUE:  Routine CT of the head was performed without intravenous contrast    Total DLP: 1787 mGy.cm    Automatic exposure control was utilized to reduce the patient's dose    COMPARISON:  None    FINDINGS:  There is no acute intracranial hemorrhage, midline shift, mass-effect, or extra-axial collection.  The ventricular system is normal in size and configuration.  There are mild patchy areas of decreased attenuation in the periventricular, deep, subcortical white matter, while nonspecific, most commonly sequela of chronic microvascular ischemia.  No sulcal effacement.  Gray-white matter differentiation is preserved.  Visualized osseous structures are intact.  Visualized paranasal sinuses and mastoid air cells are clear  Impression: No acute intracranial abnormality.    Supratentorial white matter changes, while nonspecific, most likely sequela of chronic microvascular ischemia.    Electronically signed by: Sunil Coffman MD  Date:    01/25/2023  Time:    07:53           ASSESSMENT/PLAN:       CVA (cerebral vascular accident)    Primary hypertension    Acute combined systolic and diastolic congestive heart failure    Hyperglycemia       - cont ASA/Plavix  - PT/OT  - supposedly approved for Medicaid; referral to SNF        VTE Risk Mitigation (From  admission, onward)           Ordered     IP VTE HIGH RISK PATIENT  Once         01/25/23 1649     Place sequential compression device  Until discontinued         01/25/23 1649                           Woodrow Darby MD  [facility] Medicine   [facility]

## 2023-02-02 NOTE — PROGRESS NOTES
[facility] Medicine Progress Note    Patient Name: Manpreet Josue  Age: 52 y.o.   MRN: [id number]  Admission Date: 1/25/2023  7:08 AM   Patient Class: IP- Inpatient  Benefits: Payor: MEDICAID / Plan: PENDING MEDICAID / Product Type: Government /    Primary Care Provider: Primary Doctor No   Pharmacy:   [facility] - [place] - [address]  [address] [place] [address]  Phone: [phone] Fax: [phone]    Code Status: Full Code      Chief Complaint:   Chief Complaint   Patient presents with    Cerebrovascular Accident     Pt and wife report pt having slurred speech and rt sided weakness with difficulty walking. Rt sided facial droop noted. Symptom started yesterday am.        Admit Diagnosis:   CVA (cerebral vascular accident) [I63.9]  Stroke [I63.9]     HPI:  52 year old male with non significant medical history who presented to ED accompanied by his wife with complaints of worsening symptoms of slurred speech, right facial droop and right sided weakness. They admitted last known normal was Monday night prior to going to bed and noted symptoms on Tuesday but did not present for evaluation. He denies any positive sick contact. Denies fever, chills, N/V/D   *As documented in Admit H&P     SUBJECTIVE:     Follow-up:  CVA (cerebral vascular accident)    Hospital Coarse:  1/26/23-The patient is still on a Cardene drip.  Will start titrating meds tomorrow which would have allowed for the permissive HTN.  He still has deficit.  Cardiology also saw him for his CHF.  Will need CM involvement due to lack of insurance for the patient.  I did have a lengthy discussion with the wife.    1/27/23-Patient is doing well at this time.  He is still on a Cardene drip.  Will start adding additional oral meds to help with his BP.  Therapy is still working with him as well.    1/28/23-Patient is resting at this time.  His BP is much improved.  Will try top wean off the Cardene drip  today.  Once he is stable off the drip then he can move to the floor.  Will also start a sliding scale because his HBA1C is 8.7.  Hopefully he can control diet with diet which will also be changed.    1/29  - Awake, alert, wife present. Updated on condition and plan. All questions answered. Off Cardene gtt this 2 am. BP still running a little high. Increase Toprol XL to 50mg daily. Cont ASA/Plavix. Add Metformin for DM2. Downgrade to floor.    1/30  - No complaints. Feels well. Home soon.    1/31  - Feels well. No acute events.           OBJECTIVE:             Scheduled Meds:   aspirin  324 mg Oral Once    clopidogreL  75 mg Oral Daily    lisinopriL  40 mg Oral Daily    metoprolol succinate  50 mg Oral Daily     Continuous Infusions:  PRN Meds:   dextrose 10%    dextrose 10%    glucagon (human recombinant)    glucose    glucose    insulin aspart U-100    LORazepam    ondansetron    sodium chloride 0.9%      Lines/Drains:   Lines/Drains/Airways       None                   *vitals reviewed    Physical Exam  Constitutional:       General: He is not in acute distress.  HENT:      Head: Normocephalic and atraumatic.   Cardiovascular:      Rate and Rhythm: Normal rate and regular rhythm.   Pulmonary:      Effort: Pulmonary effort is normal.      Breath sounds: Normal breath sounds.   Skin:     General: Skin is warm.   Neurological:      Mental Status: He is alert and oriented to person, place, and time.      Comments: RUE/RLE paresis, R facial droop            Recent Labs   Lab 01/28/23  0313      K 4.5   CHLORIDE 107   CO2 22   BUN 26.0*   CREATININE 0.97   GLUCOSE 207*   MG 2.20   CALCIUM 9.7   ALBUMIN 3.5   BILITOT 1.2   ALKPHOS 108   AST 21   ALT 26         Lab Results   Component Value Date    HGBA1C 8.7 (H) 01/28/2023          Echo Saline Bubble? No  · The left ventricle is mildly enlarged with moderate concentric   hypertrophy and severely decreased systolic function.  · The estimated ejection fraction is  26%.  · Grade III left ventricular diastolic dysfunction.  · Mild tricuspid regurgitation.  · Mild mitral regurgitation.  · Mild right ventricular enlargement with mildly reduced right ventricular   systolic function.  · Moderate left atrial enlargement.  · Mild right atrial enlargement.  · Elevated central venous pressure (15 mmHg).  · The estimated PA systolic pressure is 47 mmHg.  · There is pulmonary hypertension.     MRI Brain Without Contrast  Narrative: EXAMINATION:  MRI BRAIN WITH CONTRAST:    CLINICAL HISTORY:  , Stroke, follow up;    COMPARISON:  None available    FINDINGS:  Serial axial,coronal, and sagittal 1.5 [facility] images were obtained of the brain using T1 and T2- weighted techniques the administration of IV contrast. Ventricles, cisterns, and sulci are mildly prominent size.  There is no evidence of midline shift, mass effect, or abnormal extra-axial fluid collections.  Flow void is noted to the superior sagittal sinus and visualized intracranial vessels on T2 sequence imaging.  The right vertebral artery slightly dominant.  There is minimal scattered mucosal thickening at the ethmoid sinuses.  Orbital globes are symmetric in size shape and signal intensity.  Cerebellar tonsils extend caudally to the level of the foramen magnum.  There is focal abnormal somewhat bilobed signal intensity at the posterior left corona radiata and superior left thalamus on diffusion-weighted imaging compatible with a focus of acute ischemia measuring approximately 2 x 1 cm.  No other focus of abnormal signal intensity is identified on diffusion weighted imaging.  There is mild motion artifact.  Scattered small foci of abnormal signal intensity are evident at the periventricular and subcortical white matter on FLAIR sequence imaging.  Impression: 1. Mild motion artifact  2. Somewhat bilobed focus of abnormal signal intensity at the posterior left corona radiata/superior left thalamus compatible with a focus of acute  ischemia measuring approximately 2 x 1 cm  3. Generalized cerebral and cerebellar atrophy  4. Scattered foci of abnormal signal intensity at the periventricular and subcortical white matter suspicious for foci of ischemia versus gliosis    Electronically signed by: Andres Romero  Date:    01/25/2023  Time:    10:00  US Carotid Bilateral  Narrative: EXAMINATION:  US CAROTID BILATERAL    CLINICAL HISTORY:  , Cerebral infarction, unspecified.    COMPARISON:  None available    FINDINGS:  Real-time imaging was performed through the right and left carotid arteries using duplex Doppler imaging. NASCET utilized. Mild scattered plaque formation at the carotid bulbs and proximal internal are carotid arteries bilaterally with no significant stenosis identified.  Antegrade flow is evident within the vertebral arteries bilaterally.    MEASUREMENTS:    Right Systolic Velocities(cm/s):    Internal Carotid: 65.3 cm/second    Common Carotid: 78.1 cm/second    Right IC:CC Ratio: 0.8    Left Systolic Velocities(cm/s):    Internal Carotid: 77.9 cm/second    Common Carotid: 80.1 cm/second    Left IC:CC Ratio: 1.0  Impression: 1. No significant localized atherosclerotic plaque formation or stenosis noted to the visualized portions of the carotid arteries bilaterally.    Electronically signed by: Andres Romero  Date:    01/25/2023  Time:    09:49  X-Ray Chest AP Portable  Narrative: EXAMINATION:  XR CHEST AP PORTABLE    CLINICAL HISTORY:  Stroke;, .    COMPARISON:  None available    FINDINGS:  An AP view or more reveals the heart to be mildly enlarged.  The trachea is midline.  Pulmonary vasculature is prominent.  No consolidative infiltrate or effusion is seen.  Degenerative changes are noted to the thoracic spine.  Impression: 1. Mild cardiomegaly  2. Prominent pulmonary vasculature  3. Thoracic spondylosis    Electronically signed by: Andres Romero  Date:    01/25/2023  Time:    09:25  CT Head Without Contrast  Narrative:  EXAMINATION:  CT of the head without contrast    CLINICAL HISTORY:  Right-sided weakness, numbness    TECHNIQUE:  Routine CT of the head was performed without intravenous contrast    Total DLP: 1787 mGy.cm    Automatic exposure control was utilized to reduce the patient's dose    COMPARISON:  None    FINDINGS:  There is no acute intracranial hemorrhage, midline shift, mass-effect, or extra-axial collection.  The ventricular system is normal in size and configuration.  There are mild patchy areas of decreased attenuation in the periventricular, deep, subcortical white matter, while nonspecific, most commonly sequela of chronic microvascular ischemia.  No sulcal effacement.  Gray-white matter differentiation is preserved.  Visualized osseous structures are intact.  Visualized paranasal sinuses and mastoid air cells are clear  Impression: No acute intracranial abnormality.    Supratentorial white matter changes, while nonspecific, most likely sequela of chronic microvascular ischemia.    Electronically signed by: Sunil Coffman MD  Date:    01/25/2023  Time:    07:53           ASSESSMENT/PLAN:       CVA (cerebral vascular accident)    Primary hypertension    Acute combined systolic and diastolic congestive heart failure    Hyperglycemia       - cont ASA/Plavix  - PT/OT        VTE Risk Mitigation (From admission, onward)           Ordered     IP VTE HIGH RISK PATIENT  Once         01/25/23 1649     Place sequential compression device  Until discontinued         01/25/23 1649                           Woodrow Darby MD  Hospital Medicine [facility]

## 2023-02-02 NOTE — PT/OT/SLP PROGRESS
Occupational Therapy      Patient Name:  Manpreet Josue   MRN:  [id number]    Patient not seen today secondary to Other (Comment). Pt reports that he did not sleep most of last night due to stressed about going home. He is very fatigued following being up in chair most of the day and having PT earlier. Pt requesting to wait until tomorrow am for OT. Will follow-up 2/2/23.    2/2/2023

## 2023-02-03 LAB
POCT GLUCOSE: 148 MG/DL (ref 70–110)
POCT GLUCOSE: 188 MG/DL (ref 70–110)
POCT GLUCOSE: 215 MG/DL (ref 70–110)

## 2023-02-03 PROCEDURE — 97535 SELF CARE MNGMENT TRAINING: CPT

## 2023-02-03 PROCEDURE — 25000003 PHARM REV CODE 250: Performed by: INTERNAL MEDICINE

## 2023-02-03 PROCEDURE — 21400001 HC TELEMETRY ROOM

## 2023-02-03 PROCEDURE — 25000003 PHARM REV CODE 250: Performed by: NURSE PRACTITIONER

## 2023-02-03 PROCEDURE — 97112 NEUROMUSCULAR REEDUCATION: CPT

## 2023-02-03 PROCEDURE — 94761 N-INVAS EAR/PLS OXIMETRY MLT: CPT

## 2023-02-03 PROCEDURE — 97530 THERAPEUTIC ACTIVITIES: CPT

## 2023-02-03 PROCEDURE — 63600175 PHARM REV CODE 636 W HCPCS: Performed by: INTERNAL MEDICINE

## 2023-02-03 RX ADMIN — INSULIN ASPART 2 UNITS: 100 INJECTION, SOLUTION INTRAVENOUS; SUBCUTANEOUS at 05:02

## 2023-02-03 RX ADMIN — METOPROLOL SUCCINATE 50 MG: 50 TABLET, EXTENDED RELEASE ORAL at 09:02

## 2023-02-03 RX ADMIN — LISINOPRIL 40 MG: 20 TABLET ORAL at 09:02

## 2023-02-03 RX ADMIN — CLOPIDOGREL BISULFATE 75 MG: 75 TABLET ORAL at 09:02

## 2023-02-03 NOTE — PLAN OF CARE
Per Millie Wolfe, pt Medicaid Healthy Blue policy does not cover lifevest and [facility] does not accept out of pocket payments from any medicaid patient.   Pt will d/c without lifevest, unless accepting facility would agree to rent the vest at 3,000$$.   No plan to d/c pt over weekend. IP rehab referrals pending.

## 2023-02-03 NOTE — PLAN OF CARE
[facility] to see if they have accepted patient and if they submitted to Medicaid today. Per Deysi, pt is accepted by [facility], however, they are NOT submitting to Medicaid until Monday.  I asked her to please submit today so they can get the auth by Monday, so we can send pt.

## 2023-02-03 NOTE — PROGRESS NOTES
"Inpatient Nutrition Evaluation    Admit Date: 1/25/2023   Total duration of encounter: 9 days    Nutrition Recommendation/Prescription     Rec'd continue Heart Healthy (Bite Size Chopped) diet as tolerated.   Monitor intake, tolerance, weight, and labs.     RD following and available as needed.  Thank you     Nutrition Assessment     Chart Review    Reason Seen: follow-up    Malnutrition Screening Tool Results   Have you recently lost weight without trying?: No  Have you been eating poorly because of a decreased appetite?: No   MST Score: 0     Diagnosis:  CVA.     Relevant Medical History:     Nutrition-Related Medications:   None at this time.     Nutrition-Related Labs:  2/3: No new labs.   1/27: WNL  1/25: (H)    Diet Order: Diet heart healthy Diabetic (bite size chopped)  Oral Supplement Order: none  Appetite/Oral Intake: good/% of meals  Factors Affecting Nutritional Intake:  CVA. On Bite-Size Diet.   Food/Religion/Cultural Preferences: none reported  Food Allergies: none reported       Wound(s):       Comments    2/3: Pt continues with good appetite and intake. Consuming 100% of meals. No new labs. Will continue to monitor during stay.     1/27: Pt with good appetite and intake. Consuming 100% of meals. Labs and meds reviewed.  No recent weight loss noted/reported. Will continue to monitor during stay.     Anthropometrics    Height: 5' 9" (175.3 cm) Height Method: Stated  Last Weight: 114.3 kg (251 lb 15.8 oz) (01/25/23 2104) Weight Method: Bed Scale  BMI (Calculated): 37.2  BMI Classification: obese grade II (BMI 35-39.9)        Ideal Body Weight (IBW), Male: 160 lb     % Ideal Body Weight, Male (lb): 157.49 %                          Usual Weight Provided By: EMR weight history    Wt Readings from Last 3 Encounters:   01/25/23 2104 114.3 kg (251 lb 15.8 oz)   01/25/23 0658 117.9 kg (260 lb)      Weight Change(s) Since Admission:  Admit Weight: 117.9 kg (260 lb) (01/25/23 0658)      Patient " Education    Not applicable.    Monitoring & Evaluation     Dietitian will monitor food and beverage intake, energy intake, weight, electrolyte/renal panel, glucose/endocrine profile, and gastrointestinal profile.  Nutrition Risk/Follow-Up: low (follow-up in 5-7 days)  Patients assigned 'low nutrition risk' status do not qualify for a full nutritional assessment but will be monitored and re-evaluated in a 5-7 day time period. Please consult if re-evaluation needed sooner.

## 2023-02-03 NOTE — PLAN OF CARE
Spoke to patients wife Gabriela on the phone about rehab. They were initially wanting SNF at [facility], but I did tell her he was denied due to insufficient funding. They do not normally accept medicaid plans for skilled nursing. Discussed other options since he now is showing coverage with medicaid. She said she spoke to someone at the rehab in [place] and would like to try for placement there. Will submit referral in [facility].

## 2023-02-03 NOTE — PLAN OF CARE
Pt is alert and orient x 4 at the time of assessment. Pt denies any pain. Pt is still unable to voluntarily move right extremities. Pt however states that he does feel twitching of the muscles intermittently. Will continue to monitor

## 2023-02-03 NOTE — PT/OT/SLP PROGRESS
Physical Therapy Treatment    Patient Name:  Manpreet Josue   MRN:  [id number]    Recommendations:     Discharge Recommendations: rehabilitation facility  Discharge Equipment Recommendations: walker, rolling  Barriers to discharge: None    Assessment:     Manpreet Josue is a 52 y.o. male admitted with a medical diagnosis of CVA (cerebral vascular accident).  He presents with the following impairments/functional limitations: weakness, impaired endurance, impaired functional mobility, gait instability, impaired balance, decreased lower extremity function, decreased upper extremity function, decreased safety awareness, impaired self care skills, impaired coordination, decreased ROM, impaired sensation.     Pt requiring less assistance with transfers and displays great safety awareness. He still requires assist for stabilization and balance due to significant weakness of R side. HE stood to a RW and requires complete assist of RUE to keep hand fixed on the walker. He is able to perform weight shifting and can activate hip musculature to advance limb forward, but has significant foot drop.     Rehab Prognosis: Good; patient would benefit from acute skilled PT services to address these deficits and reach maximum level of function.    Recent Surgery: * No surgery found *      Plan:     During this hospitalization, patient to be seen daily to address the identified rehab impairments via gait training, therapeutic activities, therapeutic exercises and progress toward the following goals:    Plan of Care Expires:  02/23/23    Subjective     Chief Complaint: weakness to R  Patient/Family Comments/goals: to be able to get PT before returning home  Pain/Comfort:         Objective:     Communicated with patient prior to session.  Patient found HOB elevated with   upon PT entry to room.     General Precautions: Standard, fall  Orthopedic Precautions:    Braces:    Respiratory Status: Room air     Functional Mobility:  Bed Mobility:      Supine to Sit: contact guard assistance  Sit to Supine: contact guard assistance  Transfers:     Sit to Stand:  minimum assistance with rolling walker  Bed to Chair: minimum assistance with  no AD  using  Squat Pivot  Chair to mat: minimum assistance with  no AD  using  Squat Pivot  Balance: min-CGA in sitting, min A in supported standing      AM-PAC 6 CLICK MOBILITY          Treatment & Education:  See above    Patient left up in chair with all lines intact and call button in reach..    GOALS:   Multidisciplinary Problems       Physical Therapy Goals          Problem: Physical Therapy    Goal Priority Disciplines Outcome Goal Variances Interventions   Physical Therapy Goal     PT, PT/OT Ongoing, Progressing     Description: Goals to be met by: 23     Patient will increase functional independence with mobility by performin. Supine to sit with Stand-by Assistance  2. Sit to stand transfer with Stand-by Assistance  3. Gait  x 50 feet with Contact Guard Assistance using Rolling Walker.                          Time Tracking:     PT Received On: 23  PT Start Time: 1017     PT Stop Time: 1042  PT Total Time (min): 25 min     Billable Minutes: Therapeutic Activity 25    Treatment Type: Evaluation  PT/PTA: PT           2023

## 2023-02-03 NOTE — PLAN OF CARE
Medicaid number is loaded on [employer].  Uploaded into [employer] and sent to [facility] and to [facility].     Spoke to wife for more IP rehab choices.  She stated it is OK to send referral to [facility].   Referrals sent to [facility] and [facility].     Wife asked me to call [facility] and inquire about SNF there.  Called and spoke to Maria Guadalupe at [facility] and she said the do not take medicaid for skilled because they do not pay for skilled.  Called wife and informed her what [facility] stated and informed her that I will try to get him into an acute IP rehab, and told her that this will be short stay and that she will need to prepare for care after rehab, which he may need to go as a care home into a NH or he will have to go home with [facility].  She stated that he cannot go in as care home that she will be planning on taking him home.

## 2023-02-04 LAB — POCT GLUCOSE: 150 MG/DL (ref 70–110)

## 2023-02-04 PROCEDURE — 25000003 PHARM REV CODE 250: Performed by: INTERNAL MEDICINE

## 2023-02-04 PROCEDURE — 25000003 PHARM REV CODE 250: Performed by: NURSE PRACTITIONER

## 2023-02-04 PROCEDURE — 97530 THERAPEUTIC ACTIVITIES: CPT

## 2023-02-04 PROCEDURE — 94761 N-INVAS EAR/PLS OXIMETRY MLT: CPT

## 2023-02-04 PROCEDURE — 21400001 HC TELEMETRY ROOM

## 2023-02-04 RX ADMIN — METOPROLOL SUCCINATE 50 MG: 50 TABLET, EXTENDED RELEASE ORAL at 08:02

## 2023-02-04 RX ADMIN — LISINOPRIL 40 MG: 20 TABLET ORAL at 08:02

## 2023-02-04 RX ADMIN — CLOPIDOGREL BISULFATE 75 MG: 75 TABLET ORAL at 08:02

## 2023-02-04 NOTE — PT/OT/SLP PROGRESS
Physical Therapy Treatment    Patient Name:  Manpreet Josue   MRN:  [id number]    Recommendations:     Discharge Recommendations: rehabilitation facility  Discharge Equipment Recommendations: walker, rolling  Barriers to discharge: None    Assessment:     Manpreet Josue is a 52 y.o. male admitted with a medical diagnosis of CVA (cerebral vascular accident).  He presents with the following impairments/functional limitations: weakness, impaired endurance, impaired functional mobility, gait instability, impaired balance, decreased lower extremity function, decreased upper extremity function, decreased safety awareness, impaired self care skills, impaired coordination, decreased ROM, impaired sensation.     Pt able to perform bed mobility with CGA, making good use of L side to assist R side. AAROM/PROM of the R side performed with good tolerance. Noted palpable contraction of shoulder elevators and depressors and hip flexors, abd/add, and extensors.    Rehab Prognosis: Good; patient would benefit from acute skilled PT services to address these deficits and reach maximum level of function.    Recent Surgery: * No surgery found *      Plan:     During this hospitalization, patient to be seen daily to address the identified rehab impairments via gait training, therapeutic activities, therapeutic exercises and progress toward the following goals:    Plan of Care Expires:  02/23/23    Subjective     Chief Complaint: weakness to R  Patient/Family Comments/goals: to be able to get PT before returning home  Pain/Comfort:         Objective:     Communicated with patient prior to session.  Patient found HOB elevated with   upon PT entry to room.     General Precautions: Standard, fall  Orthopedic Precautions:    Braces:    Respiratory Status: Room air     Functional Mobility:  Bed Mobility:     Supine to Sit: contact guard assistance      AM-PAC 6 CLICK MOBILITY          Treatment & Education:  See above    Patient left sitting  edge of bed with all lines intact, call button in reach, and wife present..    GOALS:   Multidisciplinary Problems       Physical Therapy Goals          Problem: Physical Therapy    Goal Priority Disciplines Outcome Goal Variances Interventions   Physical Therapy Goal     PT, PT/OT Ongoing, Progressing     Description: Goals to be met by: 23     Patient will increase functional independence with mobility by performin. Supine to sit with Stand-by Assistance  2. Sit to stand transfer with Stand-by Assistance  3. Gait  x 50 feet with Contact Guard Assistance using Rolling Walker.                          Time Tracking:     PT Received On: 23  PT Start Time: 1007    PT Stop Time: 1022  PT Total Time (min): 15 min     Billable Minutes: Therapeutic Activity 15    Treatment Type: Evaluation  PT/PTA: PT           2023

## 2023-02-05 LAB
POCT GLUCOSE: 162 MG/DL (ref 70–110)
POCT GLUCOSE: 179 MG/DL (ref 70–110)

## 2023-02-05 PROCEDURE — 94761 N-INVAS EAR/PLS OXIMETRY MLT: CPT

## 2023-02-05 PROCEDURE — 25000003 PHARM REV CODE 250: Performed by: INTERNAL MEDICINE

## 2023-02-05 PROCEDURE — 25000003 PHARM REV CODE 250: Performed by: NURSE PRACTITIONER

## 2023-02-05 PROCEDURE — 97530 THERAPEUTIC ACTIVITIES: CPT

## 2023-02-05 PROCEDURE — 21400001 HC TELEMETRY ROOM

## 2023-02-05 RX ADMIN — LISINOPRIL 40 MG: 20 TABLET ORAL at 10:02

## 2023-02-05 RX ADMIN — CLOPIDOGREL BISULFATE 75 MG: 75 TABLET ORAL at 10:02

## 2023-02-05 RX ADMIN — METOPROLOL SUCCINATE 50 MG: 50 TABLET, EXTENDED RELEASE ORAL at 10:02

## 2023-02-05 NOTE — PT/OT/SLP PROGRESS
Physical Therapy Treatment    Patient Name:  Manpreet Josue   MRN:  [id number]    Recommendations:     Discharge Recommendations: rehabilitation facility  Discharge Equipment Recommendations: walker, rolling  Barriers to discharge: None    Assessment:     Manpreet Josue is a 52 y.o. male admitted with a medical diagnosis of CVA (cerebral vascular accident).  He presents with the following impairments/functional limitations: weakness, impaired endurance, impaired functional mobility, gait instability, impaired balance, decreased lower extremity function, decreased upper extremity function, decreased safety awareness, impaired self care skills, impaired coordination, decreased ROM, impaired sensation.     Pt able to perform bed mobility with CGA, making good use of L side to assist R side. Able to transfer to chair with CGA with improved stability during transfer.    Rehab Prognosis: Good; patient would benefit from acute skilled PT services to address these deficits and reach maximum level of function.    Recent Surgery: * No surgery found *      Plan:     During this hospitalization, patient to be seen daily to address the identified rehab impairments via gait training, therapeutic activities, therapeutic exercises and progress toward the following goals:    Plan of Care Expires:  02/23/23    Subjective     Chief Complaint: weakness to R  Patient/Family Comments/goals: to be able to get PT before returning home  Pain/Comfort:         Objective:     Communicated with patient prior to session.  Patient found HOB elevated with   upon PT entry to room.     General Precautions: Standard, fall  Orthopedic Precautions:    Braces:    Respiratory Status: Room air     Functional Mobility:  Bed Mobility:     Supine to Sit: contact guard assistance  Transfers:     Sit to Stand:  contact guard assistance with no AD  Bed to Chair: contact guard assistance with  no AD  using  Stand Pivot      AM-PAC 6 CLICK MOBILITY           Treatment & Education:  See above    Patient left up in chair with all lines intact, call button in reach, and wife present..    GOALS:   Multidisciplinary Problems       Physical Therapy Goals          Problem: Physical Therapy    Goal Priority Disciplines Outcome Goal Variances Interventions   Physical Therapy Goal     PT, PT/OT Ongoing, Progressing     Description: Goals to be met by: 23     Patient will increase functional independence with mobility by performin. Supine to sit with Stand-by Assistance  2. Sit to stand transfer with Stand-by Assistance  3. Gait  x 50 feet with Contact Guard Assistance using Rolling Walker.                          Time Tracking:     PT Received On: 23  PT Start Time: 953    PT Stop Time: 1013  PT Total Time (min): 20 min     Billable Minutes: Therapeutic Activity 20    Treatment Type: Evaluation  PT/PTA: PT           2023

## 2023-02-06 LAB
POCT GLUCOSE: 152 MG/DL (ref 70–110)
POCT GLUCOSE: 192 MG/DL (ref 70–110)
POCT GLUCOSE: 225 MG/DL (ref 70–110)
POCT GLUCOSE: 235 MG/DL (ref 70–110)

## 2023-02-06 PROCEDURE — 97535 SELF CARE MNGMENT TRAINING: CPT

## 2023-02-06 PROCEDURE — 63600175 PHARM REV CODE 636 W HCPCS: Performed by: INTERNAL MEDICINE

## 2023-02-06 PROCEDURE — 25000003 PHARM REV CODE 250: Performed by: INTERNAL MEDICINE

## 2023-02-06 PROCEDURE — 92507 TX SP LANG VOICE COMM INDIV: CPT

## 2023-02-06 PROCEDURE — 97112 NEUROMUSCULAR REEDUCATION: CPT

## 2023-02-06 PROCEDURE — 21400001 HC TELEMETRY ROOM

## 2023-02-06 PROCEDURE — 97530 THERAPEUTIC ACTIVITIES: CPT

## 2023-02-06 PROCEDURE — 25000003 PHARM REV CODE 250: Performed by: NURSE PRACTITIONER

## 2023-02-06 PROCEDURE — 94761 N-INVAS EAR/PLS OXIMETRY MLT: CPT

## 2023-02-06 RX ADMIN — CLOPIDOGREL BISULFATE 75 MG: 75 TABLET ORAL at 08:02

## 2023-02-06 RX ADMIN — LISINOPRIL 40 MG: 20 TABLET ORAL at 08:02

## 2023-02-06 RX ADMIN — INSULIN ASPART 1 UNITS: 100 INJECTION, SOLUTION INTRAVENOUS; SUBCUTANEOUS at 07:02

## 2023-02-06 RX ADMIN — METOPROLOL SUCCINATE 50 MG: 50 TABLET, EXTENDED RELEASE ORAL at 08:02

## 2023-02-06 NOTE — PROGRESS NOTES
[facility] Medicine Progress Note    Patient Name: Manpreet Josue  Age: 52 y.o.   MRN: [id number]  Admission Date: 1/25/2023  7:08 AM   Patient Class: IP- Inpatient  Benefits: Payor: MEDICAID / Plan: [place] / Product Type: Managed Medicaid /    Primary Care Provider: Primary Doctor No   Pharmacy:   [place] [address] [facility] - [address]  [address] [place] [address]  Phone: [phone] Fax: [phone]    Code Status: Full Code      Chief Complaint:   Chief Complaint   Patient presents with    Cerebrovascular Accident     Pt and wife report pt having slurred speech and rt sided weakness with difficulty walking. Rt sided facial droop noted. Symptom started yesterday am.        Admit Diagnosis:   CVA (cerebral vascular accident) [I63.9]  Stroke [I63.9]     HPI:  52 year old male with non significant medical history who presented to ED accompanied by his wife with complaints of worsening symptoms of slurred speech, right facial droop and right sided weakness. They admitted last known normal was Monday night prior to going to bed and noted symptoms on Tuesday but did not present for evaluation. He denies any positive sick contact. Denies fever, chills, N/V/D   *As documented in Admit H&P by Woodrow Darby MD    SUBJECTIVE:     Follow-up:  CVA (cerebral vascular accident)    Hospital Coarse:  1/26/23-The patient is still on a Cardene drip.  Will start titrating meds tomorrow which would have allowed for the permissive HTN.  He still has deficit.  Cardiology also saw him for his CHF.  Will need CM involvement due to lack of insurance for the patient.  I did have a lengthy discussion with the wife.    1/27/23-Patient is doing well at this time.  He is still on a Cardene drip.  Will start adding additional oral meds to help with his BP.  Therapy is still working with him as well.    1/28/23-Patient is resting at this time.  His BP is much improved.   Will try top wean off the Cardene drip today.  Once he is stable off the drip then he can move to the floor.  Will also start a sliding scale because his HBA1C is 8.7.  Hopefully he can control diet with diet which will also be changed.    1/29  - Awake, alert, wife present. Updated on condition and plan. All questions answered. Off Cardene gtt this 2 am. BP still running a little high. Increase Toprol XL to 50mg daily. Cont ASA/Plavix. Add Metformin for DM2. Downgrade to floor.    1/30  - No complaints. Feels well. Home soon.    1/31  - Feels well. No acute events.     2/1  - Cont with Right paresis. PT to teach wife how to take care of pt at home and DME recs.    2/2  - No new complaints. Home soon.          OBJECTIVE:     Vitals reviewed    Physical Exam  Constitutional:       General: He is not in acute distress.  HENT:      Head: Normocephalic and atraumatic.   Cardiovascular:      Rate and Rhythm: Normal rate and regular rhythm.   Pulmonary:      Effort: Pulmonary effort is normal.      Breath sounds: Normal breath sounds.   Skin:     General: Skin is warm.   Neurological:      Mental Status: He is alert and oriented to person, place, and time.      Comments: RUE/RLE paresis, R facial droop          Echo Saline Bubble? No  · The left ventricle is mildly enlarged with moderate concentric   hypertrophy and severely decreased systolic function.  · The estimated ejection fraction is 26%.  · Grade III left ventricular diastolic dysfunction.  · Mild tricuspid regurgitation.  · Mild mitral regurgitation.  · Mild right ventricular enlargement with mildly reduced right ventricular   systolic function.  · Moderate left atrial enlargement.  · Mild right atrial enlargement.  · Elevated central venous pressure (15 mmHg).  · The estimated PA systolic pressure is 47 mmHg.  · There is pulmonary hypertension.     MRI Brain Without Contrast  Narrative: EXAMINATION:  MRI BRAIN WITH CONTRAST:    CLINICAL HISTORY:  , Stroke,  follow up;    COMPARISON:  None available    FINDINGS:  Serial axial,coronal, and sagittal 1.5 Carina images were obtained of the brain using T1 and T2- weighted techniques the administration of IV contrast. Ventricles, cisterns, and sulci are mildly prominent size.  There is no evidence of midline shift, mass effect, or abnormal extra-axial fluid collections.  Flow void is noted to the superior sagittal sinus and visualized intracranial vessels on T2 sequence imaging.  The right vertebral artery slightly dominant.  There is minimal scattered mucosal thickening at the ethmoid sinuses.  Orbital globes are symmetric in size shape and signal intensity.  Cerebellar tonsils extend caudally to the level of the foramen magnum.  There is focal abnormal somewhat bilobed signal intensity at the posterior left corona radiata and superior left thalamus on diffusion-weighted imaging compatible with a focus of acute ischemia measuring approximately 2 x 1 cm.  No other focus of abnormal signal intensity is identified on diffusion weighted imaging.  There is mild motion artifact.  Scattered small foci of abnormal signal intensity are evident at the periventricular and subcortical white matter on FLAIR sequence imaging.  Impression: 1. Mild motion artifact  2. Somewhat bilobed focus of abnormal signal intensity at the posterior left corona radiata/superior left thalamus compatible with a focus of acute ischemia measuring approximately 2 x 1 cm  3. Generalized cerebral and cerebellar atrophy  4. Scattered foci of abnormal signal intensity at the periventricular and subcortical white matter suspicious for foci of ischemia versus gliosis    Electronically signed by: Andres Romero  Date:    01/25/2023  Time:    10:00  US Carotid Bilateral  Narrative: EXAMINATION:  US CAROTID BILATERAL    CLINICAL HISTORY:  , Cerebral infarction, unspecified.    COMPARISON:  None available    FINDINGS:  Real-time imaging was performed through the right  and left carotid arteries using duplex Doppler imaging. NASCET utilized. Mild scattered plaque formation at the carotid bulbs and proximal internal are carotid arteries bilaterally with no significant stenosis identified.  Antegrade flow is evident within the vertebral arteries bilaterally.    MEASUREMENTS:    Right Systolic Velocities(cm/s):    Internal Carotid: 65.3 cm/second    Common Carotid: 78.1 cm/second    Right IC:CC Ratio: 0.8    Left Systolic Velocities(cm/s):    Internal Carotid: 77.9 cm/second    Common Carotid: 80.1 cm/second    Left IC:CC Ratio: 1.0  Impression: 1. No significant localized atherosclerotic plaque formation or stenosis noted to the visualized portions of the carotid arteries bilaterally.    Electronically signed by: Andres Romero  Date:    01/25/2023  Time:    09:49  X-Ray Chest AP Portable  Narrative: EXAMINATION:  XR CHEST AP PORTABLE    CLINICAL HISTORY:  Stroke;, .    COMPARISON:  None available    FINDINGS:  An AP view or more reveals the heart to be mildly enlarged.  The trachea is midline.  Pulmonary vasculature is prominent.  No consolidative infiltrate or effusion is seen.  Degenerative changes are noted to the thoracic spine.  Impression: 1. Mild cardiomegaly  2. Prominent pulmonary vasculature  3. Thoracic spondylosis    Electronically signed by: Andres Romero  Date:    01/25/2023  Time:    09:25  CT Head Without Contrast  Narrative: EXAMINATION:  CT of the head without contrast    CLINICAL HISTORY:  Right-sided weakness, numbness    TECHNIQUE:  Routine CT of the head was performed without intravenous contrast    Total DLP: 1787 mGy.cm    Automatic exposure control was utilized to reduce the patient's dose    COMPARISON:  None    FINDINGS:  There is no acute intracranial hemorrhage, midline shift, mass-effect, or extra-axial collection.  The ventricular system is normal in size and configuration.  There are mild patchy areas of decreased attenuation in the periventricular,  deep, subcortical white matter, while nonspecific, most commonly sequela of chronic microvascular ischemia.  No sulcal effacement.  Gray-white matter differentiation is preserved.  Visualized osseous structures are intact.  Visualized paranasal sinuses and mastoid air cells are clear  Impression: No acute intracranial abnormality.    Supratentorial white matter changes, while nonspecific, most likely sequela of chronic microvascular ischemia.    Electronically signed by: Sunil Coffman MD  Date:    01/25/2023  Time:    07:53           ASSESSMENT/PLAN:       CVA (cerebral vascular accident)    Primary hypertension    Acute combined systolic and diastolic congestive heart failure    Hyperglycemia       - cont current  - rehab pending        VTE Risk Mitigation (From admission, onward)           Ordered     IP VTE HIGH RISK PATIENT  Once         01/25/23 1649     Place sequential compression device  Until discontinued         01/25/23 1649                           Woodrow Darby MD  Hospital Medicine [facility]

## 2023-02-06 NOTE — PROGRESS NOTES
[facility] Medicine Progress Note    Patient Name: Manpreet Josue  Age: 52 y.o.   MRN: [id number]  Admission Date: 1/25/2023  7:08 AM   Patient Class: IP- Inpatient  Benefits: Payor: MEDICAID / Plan: [place] / Product Type: Managed Medicaid /    Primary Care Provider: Primary Doctor No   Pharmacy:   [place] [address] [facility] - [address]  [address] [place] [address]  Phone: [phone] Fax: [phone]    Code Status: Full Code      Chief Complaint:   Chief Complaint   Patient presents with    Cerebrovascular Accident     Pt and wife report pt having slurred speech and rt sided weakness with difficulty walking. Rt sided facial droop noted. Symptom started yesterday am.        Admit Diagnosis:   CVA (cerebral vascular accident) [I63.9]  Stroke [I63.9]     HPI:  52 year old male with non significant medical history who presented to ED accompanied by his wife with complaints of worsening symptoms of slurred speech, right facial droop and right sided weakness. They admitted last known normal was Monday night prior to going to bed and noted symptoms on Tuesday but did not present for evaluation. He denies any positive sick contact. Denies fever, chills, N/V/D   *As documented in Admit H&P by Woodrow Darby MD    SUBJECTIVE:     Follow-up:  CVA (cerebral vascular accident)    Hospital Coarse:  1/26/23-The patient is still on a Cardene drip.  Will start titrating meds tomorrow which would have allowed for the permissive HTN.  He still has deficit.  Cardiology also saw him for his CHF.  Will need CM involvement due to lack of insurance for the patient.  I did have a lengthy discussion with the wife.    1/27/23-Patient is doing well at this time.  He is still on a Cardene drip.  Will start adding additional oral meds to help with his BP.  Therapy is still working with him as well.    1/28/23-Patient is resting at this time.  His BP is much improved.   Will try top wean off the Cardene drip today.  Once he is stable off the drip then he can move to the floor.  Will also start a sliding scale because his HBA1C is 8.7.  Hopefully he can control diet with diet which will also be changed.    1/29  - Awake, alert, wife present. Updated on condition and plan. All questions answered. Off Cardene gtt this 2 am. BP still running a little high. Increase Toprol XL to 50mg daily. Cont ASA/Plavix. Add Metformin for DM2. Downgrade to floor.    1/30  - No complaints. Feels well. Home soon.    1/31  - Feels well. No acute events.     2/1  - Cont with Right paresis. PT to teach wife how to take care of pt at home and DME recs.    2/2  - No new complaints. Home soon.    2/3  - No new complaints. Pt now has Medicaid. Rehab referral.    2/4  - Awaiting rehab. No complaints.    2/5  - Feels well. No new complaints or events. Rehab pending.         OBJECTIVE:     Vital Signs (Most Recent)  Temp: 97.9 °F (36.6 °C) (02/05/23 1622)  Pulse: 78 (02/05/23 1622)  Resp: 20 (02/05/23 1622)  BP: (!) 164/91 (02/05/23 1622)  SpO2: 96 % (02/05/23 1622)      Vital Signs Range (Last 24H):  Temp:  [97.9 °F (36.6 °C)-98.6 °F (37 °C)]   Pulse:  [63-80]   Resp:  [20]   BP: (124-164)/(62-91)   SpO2:  [96 %-99 %]  .vi    I & O (Last 24H):  Intake/Output Summary (Last 24 hours) at 2/5/2023 2008  Last data filed at 2/5/2023 1200  Gross per 24 hour   Intake 360 ml   Output --   Net 360 ml         Scheduled Meds:   aspirin  324 mg Oral Once    clopidogreL  75 mg Oral Daily    lisinopriL  40 mg Oral Daily    metoprolol succinate  50 mg Oral Daily     Continuous Infusions:  PRN Meds:   dextrose 10%    dextrose 10%    glucagon (human recombinant)    glucose    glucose    insulin aspart U-100    LORazepam    ondansetron    sodium chloride 0.9%      Lines/Drains:   Lines/Drains/Airways       None                       Physical Exam  Constitutional:       General: He is not in acute distress.  HENT:      Head:  Normocephalic and atraumatic.   Cardiovascular:      Rate and Rhythm: Normal rate and regular rhythm.   Pulmonary:      Effort: Pulmonary effort is normal.      Breath sounds: Normal breath sounds.   Skin:     General: Skin is warm.   Neurological:      Mental Status: He is alert and oriented to person, place, and time.      Comments: RUE/RLE paresis, R facial droop          Echo Saline Bubble? No  · The left ventricle is mildly enlarged with moderate concentric   hypertrophy and severely decreased systolic function.  · The estimated ejection fraction is 26%.  · Grade III left ventricular diastolic dysfunction.  · Mild tricuspid regurgitation.  · Mild mitral regurgitation.  · Mild right ventricular enlargement with mildly reduced right ventricular   systolic function.  · Moderate left atrial enlargement.  · Mild right atrial enlargement.  · Elevated central venous pressure (15 mmHg).  · The estimated PA systolic pressure is 47 mmHg.  · There is pulmonary hypertension.     MRI Brain Without Contrast  Narrative: EXAMINATION:  MRI BRAIN WITH CONTRAST:    CLINICAL HISTORY:  , Stroke, follow up;    COMPARISON:  None available    FINDINGS:  Serial axial,coronal, and sagittal 1.5 Carina images were obtained of the brain using T1 and T2- weighted techniques the administration of IV contrast. Ventricles, cisterns, and sulci are mildly prominent size.  There is no evidence of midline shift, mass effect, or abnormal extra-axial fluid collections.  Flow void is noted to the superior sagittal sinus and visualized intracranial vessels on T2 sequence imaging.  The right vertebral artery slightly dominant.  There is minimal scattered mucosal thickening at the ethmoid sinuses.  Orbital globes are symmetric in size shape and signal intensity.  Cerebellar tonsils extend caudally to the level of the foramen magnum.  There is focal abnormal somewhat bilobed signal intensity at the posterior left corona radiata and superior left thalamus  on diffusion-weighted imaging compatible with a focus of acute ischemia measuring approximately 2 x 1 cm.  No other focus of abnormal signal intensity is identified on diffusion weighted imaging.  There is mild motion artifact.  Scattered small foci of abnormal signal intensity are evident at the periventricular and subcortical white matter on FLAIR sequence imaging.  Impression: 1. Mild motion artifact  2. Somewhat bilobed focus of abnormal signal intensity at the posterior left corona radiata/superior left thalamus compatible with a focus of acute ischemia measuring approximately 2 x 1 cm  3. Generalized cerebral and cerebellar atrophy  4. Scattered foci of abnormal signal intensity at the periventricular and subcortical white matter suspicious for foci of ischemia versus gliosis    Electronically signed by: Andres Romero  Date:    01/25/2023  Time:    10:00  US Carotid Bilateral  Narrative: EXAMINATION:  US CAROTID BILATERAL    CLINICAL HISTORY:  , Cerebral infarction, unspecified.    COMPARISON:  None available    FINDINGS:  Real-time imaging was performed through the right and left carotid arteries using duplex Doppler imaging. NASCET utilized. Mild scattered plaque formation at the carotid bulbs and proximal internal are carotid arteries bilaterally with no significant stenosis identified.  Antegrade flow is evident within the vertebral arteries bilaterally.    MEASUREMENTS:    Right Systolic Velocities(cm/s):    Internal Carotid: 65.3 cm/second    Common Carotid: 78.1 cm/second    Right IC:CC Ratio: 0.8    Left Systolic Velocities(cm/s):    Internal Carotid: 77.9 cm/second    Common Carotid: 80.1 cm/second    Left IC:CC Ratio: 1.0  Impression: 1. No significant localized atherosclerotic plaque formation or stenosis noted to the visualized portions of the carotid arteries bilaterally.    Electronically signed by: Andres Romero  Date:    01/25/2023  Time:    09:49  X-Ray Chest AP Portable  Narrative:  EXAMINATION:  XR CHEST AP PORTABLE    CLINICAL HISTORY:  Stroke;, .    COMPARISON:  None available    FINDINGS:  An AP view or more reveals the heart to be mildly enlarged.  The trachea is midline.  Pulmonary vasculature is prominent.  No consolidative infiltrate or effusion is seen.  Degenerative changes are noted to the thoracic spine.  Impression: 1. Mild cardiomegaly  2. Prominent pulmonary vasculature  3. Thoracic spondylosis    Electronically signed by: Andres Romero  Date:    01/25/2023  Time:    09:25  CT Head Without Contrast  Narrative: EXAMINATION:  CT of the head without contrast    CLINICAL HISTORY:  Right-sided weakness, numbness    TECHNIQUE:  Routine CT of the head was performed without intravenous contrast    Total DLP: 1787 mGy.cm    Automatic exposure control was utilized to reduce the patient's dose    COMPARISON:  None    FINDINGS:  There is no acute intracranial hemorrhage, midline shift, mass-effect, or extra-axial collection.  The ventricular system is normal in size and configuration.  There are mild patchy areas of decreased attenuation in the periventricular, deep, subcortical white matter, while nonspecific, most commonly sequela of chronic microvascular ischemia.  No sulcal effacement.  Gray-white matter differentiation is preserved.  Visualized osseous structures are intact.  Visualized paranasal sinuses and mastoid air cells are clear  Impression: No acute intracranial abnormality.    Supratentorial white matter changes, while nonspecific, most likely sequela of chronic microvascular ischemia.    Electronically signed by: Sunil Coffman MD  Date:    01/25/2023  Time:    07:53           ASSESSMENT/PLAN:       CVA (cerebral vascular accident)    Primary hypertension    Acute combined systolic and diastolic congestive heart failure    Hyperglycemia       - cont current  - rehab pending        VTE Risk Mitigation (From admission, onward)           Ordered     IP VTE HIGH RISK PATIENT  Once          01/25/23 1649     Place sequential compression device  Until discontinued         01/25/23 1649                           Woodrow Darby MD  Hospital Medicine [facility]

## 2023-02-06 NOTE — PROGRESS NOTES
[facility] Medicine Progress Note    Patient Name: Manpreet Josue  Age: 52 y.o.   MRN: [id number]  Admission Date: 1/25/2023  7:08 AM   Patient Class: IP- Inpatient  Benefits: Payor: MEDICAID / Plan: [place]UE [place] / Product Type: Managed Medicaid /    Primary Care Provider: Primary Doctor No   Pharmacy:   [place] [address] [facility] - [address]  [address] [place] [address]  Phone: [phone] Fax: [phone]    Code Status: Full Code      Chief Complaint:   Chief Complaint   Patient presents with    Cerebrovascular Accident     Pt and wife report pt having slurred speech and rt sided weakness with difficulty walking. Rt sided facial droop noted. Symptom started yesterday am.        Admit Diagnosis:   CVA (cerebral vascular accident) [I63.9]  Stroke [I63.9]     HPI:  52 year old male with non significant medical history who presented to ED accompanied by his wife with complaints of worsening symptoms of slurred speech, right facial droop and right sided weakness. They admitted last known normal was Monday night prior to going to bed and noted symptoms on Tuesday but did not present for evaluation. He denies any positive sick contact. Denies fever, chills, N/V/D   *As documented in Admit H&P by Woodrow Darby MD    SUBJECTIVE:     Follow-up:  CVA (cerebral vascular accident)    Hospital Coarse:  1/26/23-The patient is still on a Cardene drip.  Will start titrating meds tomorrow which would have allowed for the permissive HTN.  He still has deficit.  Cardiology also saw him for his CHF.  Will need CM involvement due to lack of insurance for the patient.  I did have a lengthy discussion with the wife.    1/27/23-Patient is doing well at this time.  He is still on a Cardene drip.  Will start adding additional oral meds to help with his BP.  Therapy is still working with him as well.    1/28/23-Patient is resting at this time.  His BP is much improved.   Will try top wean off the Cardene drip today.  Once he is stable off the drip then he can move to the floor.  Will also start a sliding scale because his HBA1C is 8.7.  Hopefully he can control diet with diet which will also be changed.    1/29  - Awake, alert, wife present. Updated on condition and plan. All questions answered. Off Cardene gtt this 2 am. BP still running a little high. Increase Toprol XL to 50mg daily. Cont ASA/Plavix. Add Metformin for DM2. Downgrade to floor.    1/30  - No complaints. Feels well. Home soon.    1/31  - Feels well. No acute events.     2/1  - Cont with Right paresis. PT to teach wife how to take care of pt at home and DME recs.    2/2  - No new complaints. Home soon.    2/3  - No new complaints. Pt now has Medicaid. Rehab referral.    2/4  - Awaiting rehab. No complaints.        OBJECTIVE:       Physical Exam  Constitutional:       General: He is not in acute distress.  HENT:      Head: Normocephalic and atraumatic.   Cardiovascular:      Rate and Rhythm: Normal rate and regular rhythm.   Pulmonary:      Effort: Pulmonary effort is normal.      Breath sounds: Normal breath sounds.   Skin:     General: Skin is warm.   Neurological:      Mental Status: He is alert and oriented to person, place, and time.      Comments: RUE/RLE paresis, R facial droop          Echo Saline Bubble? No  · The left ventricle is mildly enlarged with moderate concentric   hypertrophy and severely decreased systolic function.  · The estimated ejection fraction is 26%.  · Grade III left ventricular diastolic dysfunction.  · Mild tricuspid regurgitation.  · Mild mitral regurgitation.  · Mild right ventricular enlargement with mildly reduced right ventricular   systolic function.  · Moderate left atrial enlargement.  · Mild right atrial enlargement.  · Elevated central venous pressure (15 mmHg).  · The estimated PA systolic pressure is 47 mmHg.  · There is pulmonary hypertension.     MRI Brain Without  Contrast  Narrative: EXAMINATION:  MRI BRAIN WITH CONTRAST:    CLINICAL HISTORY:  , Stroke, follow up;    COMPARISON:  None available    FINDINGS:  Serial axial,coronal, and sagittal 1.5 Carina images were obtained of the brain using T1 and T2- weighted techniques the administration of IV contrast. Ventricles, cisterns, and sulci are mildly prominent size.  There is no evidence of midline shift, mass effect, or abnormal extra-axial fluid collections.  Flow void is noted to the superior sagittal sinus and visualized intracranial vessels on T2 sequence imaging.  The right vertebral artery slightly dominant.  There is minimal scattered mucosal thickening at the ethmoid sinuses.  Orbital globes are symmetric in size shape and signal intensity.  Cerebellar tonsils extend caudally to the level of the foramen magnum.  There is focal abnormal somewhat bilobed signal intensity at the posterior left corona radiata and superior left thalamus on diffusion-weighted imaging compatible with a focus of acute ischemia measuring approximately 2 x 1 cm.  No other focus of abnormal signal intensity is identified on diffusion weighted imaging.  There is mild motion artifact.  Scattered small foci of abnormal signal intensity are evident at the periventricular and subcortical white matter on FLAIR sequence imaging.  Impression: 1. Mild motion artifact  2. Somewhat bilobed focus of abnormal signal intensity at the posterior left corona radiata/superior left thalamus compatible with a focus of acute ischemia measuring approximately 2 x 1 cm  3. Generalized cerebral and cerebellar atrophy  4. Scattered foci of abnormal signal intensity at the periventricular and subcortical white matter suspicious for foci of ischemia versus gliosis    Electronically signed by: Andres Romero  Date:    01/25/2023  Time:    10:00  US Carotid Bilateral  Narrative: EXAMINATION:  US CAROTID BILATERAL    CLINICAL HISTORY:  , Cerebral infarction,  unspecified.    COMPARISON:  None available    FINDINGS:  Real-time imaging was performed through the right and left carotid arteries using duplex Doppler imaging. NASCET utilized. Mild scattered plaque formation at the carotid bulbs and proximal internal are carotid arteries bilaterally with no significant stenosis identified.  Antegrade flow is evident within the vertebral arteries bilaterally.    MEASUREMENTS:    Right Systolic Velocities(cm/s):    Internal Carotid: 65.3 cm/second    Common Carotid: 78.1 cm/second    Right IC:CC Ratio: 0.8    Left Systolic Velocities(cm/s):    Internal Carotid: 77.9 cm/second    Common Carotid: 80.1 cm/second    Left IC:CC Ratio: 1.0  Impression: 1. No significant localized atherosclerotic plaque formation or stenosis noted to the visualized portions of the carotid arteries bilaterally.    Electronically signed by: Andres Romero  Date:    01/25/2023  Time:    09:49  X-Ray Chest AP Portable  Narrative: EXAMINATION:  XR CHEST AP PORTABLE    CLINICAL HISTORY:  Stroke;, .    COMPARISON:  None available    FINDINGS:  An AP view or more reveals the heart to be mildly enlarged.  The trachea is midline.  Pulmonary vasculature is prominent.  No consolidative infiltrate or effusion is seen.  Degenerative changes are noted to the thoracic spine.  Impression: 1. Mild cardiomegaly  2. Prominent pulmonary vasculature  3. Thoracic spondylosis    Electronically signed by: Andres Romero  Date:    01/25/2023  Time:    09:25  CT Head Without Contrast  Narrative: EXAMINATION:  CT of the head without contrast    CLINICAL HISTORY:  Right-sided weakness, numbness    TECHNIQUE:  Routine CT of the head was performed without intravenous contrast    Total DLP: 1787 mGy.cm    Automatic exposure control was utilized to reduce the patient's dose    COMPARISON:  None    FINDINGS:  There is no acute intracranial hemorrhage, midline shift, mass-effect, or extra-axial collection.  The ventricular system is normal  in size and configuration.  There are mild patchy areas of decreased attenuation in the periventricular, deep, subcortical white matter, while nonspecific, most commonly sequela of chronic microvascular ischemia.  No sulcal effacement.  Gray-white matter differentiation is preserved.  Visualized osseous structures are intact.  Visualized paranasal sinuses and mastoid air cells are clear  Impression: No acute intracranial abnormality.    Supratentorial white matter changes, while nonspecific, most likely sequela of chronic microvascular ischemia.    Electronically signed by: Sunil Coffman MD  Date:    01/25/2023  Time:    07:53           ASSESSMENT/PLAN:       CVA (cerebral vascular accident)    Primary hypertension    Acute combined systolic and diastolic congestive heart failure    Hyperglycemia       - cont current  - rehab pending        VTE Risk Mitigation (From admission, onward)           Ordered     IP VTE HIGH RISK PATIENT  Once         01/25/23 1649     Place sequential compression device  Until discontinued         01/25/23 1649                           Woodrow Darby MD  [facility]   [facility]

## 2023-02-06 NOTE — PROGRESS NOTES
[facility] Medicine Progress Note    Patient Name: Manpreet Josue  Age: 52 y.o.   MRN: [id number]  Admission Date: 1/25/2023  7:08 AM   Patient Class: IP- Inpatient  Benefits: Payor: MEDICAID / Plan: [place] / Product Type: Managed Medicaid /    Primary Care Provider: Primary Doctor No   Pharmacy:   [place] [address] [facility]JA[facility] - [address]  [address] [place] [address]  Phone: [phone] Fax: [phone]    Code Status: Full Code      Chief Complaint:   Chief Complaint   Patient presents with    Cerebrovascular Accident     Pt and wife report pt having slurred speech and rt sided weakness with difficulty walking. Rt sided facial droop noted. Symptom started yesterday am.        Admit Diagnosis:   CVA (cerebral vascular accident) [I63.9]  Stroke [I63.9]     HPI:  52 year old male with non significant medical history who presented to ED accompanied by his wife with complaints of worsening symptoms of slurred speech, right facial droop and right sided weakness. They admitted last known normal was Monday night prior to going to bed and noted symptoms on Tuesday but did not present for evaluation. He denies any positive sick contact. Denies fever, chills, N/V/D   *As documented in Admit H&P by Woodrow Darby MD    SUBJECTIVE:     Follow-up:  CVA (cerebral vascular accident)    Hospital Coarse:  1/26/23-The patient is still on a Cardene drip.  Will start titrating meds tomorrow which would have allowed for the permissive HTN.  He still has deficit.  Cardiology also saw him for his CHF.  Will need CM involvement due to lack of insurance for the patient.  I did have a lengthy discussion with the wife.    1/27/23-Patient is doing well at this time.  He is still on a Cardene drip.  Will start adding additional oral meds to help with his BP.  Therapy is still working with him as well.    1/28/23-Patient is resting at this time.  His BP is much improved.   Will try top wean off the Cardene drip today.  Once he is stable off the drip then he can move to the floor.  Will also start a sliding scale because his HBA1C is 8.7.  Hopefully he can control diet with diet which will also be changed.    1/29  - Awake, alert, wife present. Updated on condition and plan. All questions answered. Off Cardene gtt this 2 am. BP still running a little high. Increase Toprol XL to 50mg daily. Cont ASA/Plavix. Add Metformin for DM2. Downgrade to floor.    1/30  - No complaints. Feels well. Home soon.    1/31  - Feels well. No acute events.     2/1  - Cont with Right paresis. PT to teach wife how to take care of pt at home and DME recs.    2/2  - No new complaints. Home soon.    2/3  - No new complaints. Pt now has Medicaid. Rehab referral.          OBJECTIVE:       Physical Exam  Constitutional:       General: He is not in acute distress.  HENT:      Head: Normocephalic and atraumatic.   Cardiovascular:      Rate and Rhythm: Normal rate and regular rhythm.   Pulmonary:      Effort: Pulmonary effort is normal.      Breath sounds: Normal breath sounds.   Skin:     General: Skin is warm.   Neurological:      Mental Status: He is alert and oriented to person, place, and time.      Comments: RUE/RLE paresis, R facial droop          Echo Saline Bubble? No  · The left ventricle is mildly enlarged with moderate concentric   hypertrophy and severely decreased systolic function.  · The estimated ejection fraction is 26%.  · Grade III left ventricular diastolic dysfunction.  · Mild tricuspid regurgitation.  · Mild mitral regurgitation.  · Mild right ventricular enlargement with mildly reduced right ventricular   systolic function.  · Moderate left atrial enlargement.  · Mild right atrial enlargement.  · Elevated central venous pressure (15 mmHg).  · The estimated PA systolic pressure is 47 mmHg.  · There is pulmonary hypertension.     MRI Brain Without Contrast  Narrative: EXAMINATION:  MRI BRAIN  WITH CONTRAST:    CLINICAL HISTORY:  , Stroke, follow up;    COMPARISON:  None available    FINDINGS:  Serial axial,coronal, and sagittal 1.5 Carina images were obtained of the brain using T1 and T2- weighted techniques the administration of IV contrast. Ventricles, cisterns, and sulci are mildly prominent size.  There is no evidence of midline shift, mass effect, or abnormal extra-axial fluid collections.  Flow void is noted to the superior sagittal sinus and visualized intracranial vessels on T2 sequence imaging.  The right vertebral artery slightly dominant.  There is minimal scattered mucosal thickening at the ethmoid sinuses.  Orbital globes are symmetric in size shape and signal intensity.  Cerebellar tonsils extend caudally to the level of the foramen magnum.  There is focal abnormal somewhat bilobed signal intensity at the posterior left corona radiata and superior left thalamus on diffusion-weighted imaging compatible with a focus of acute ischemia measuring approximately 2 x 1 cm.  No other focus of abnormal signal intensity is identified on diffusion weighted imaging.  There is mild motion artifact.  Scattered small foci of abnormal signal intensity are evident at the periventricular and subcortical white matter on FLAIR sequence imaging.  Impression: 1. Mild motion artifact  2. Somewhat bilobed focus of abnormal signal intensity at the posterior left corona radiata/superior left thalamus compatible with a focus of acute ischemia measuring approximately 2 x 1 cm  3. Generalized cerebral and cerebellar atrophy  4. Scattered foci of abnormal signal intensity at the periventricular and subcortical white matter suspicious for foci of ischemia versus gliosis    Electronically signed by: Andres Romero  Date:    01/25/2023  Time:    10:00  US Carotid Bilateral  Narrative: EXAMINATION:  US CAROTID BILATERAL    CLINICAL HISTORY:  , Cerebral infarction, unspecified.    COMPARISON:  None  available    FINDINGS:  Real-time imaging was performed through the right and left carotid arteries using duplex Doppler imaging. NASCET utilized. Mild scattered plaque formation at the carotid bulbs and proximal internal are carotid arteries bilaterally with no significant stenosis identified.  Antegrade flow is evident within the vertebral arteries bilaterally.    MEASUREMENTS:    Right Systolic Velocities(cm/s):    Internal Carotid: 65.3 cm/second    Common Carotid: 78.1 cm/second    Right IC:CC Ratio: 0.8    Left Systolic Velocities(cm/s):    Internal Carotid: 77.9 cm/second    Common Carotid: 80.1 cm/second    Left IC:CC Ratio: 1.0  Impression: 1. No significant localized atherosclerotic plaque formation or stenosis noted to the visualized portions of the carotid arteries bilaterally.    Electronically signed by: Andres Romero  Date:    01/25/2023  Time:    09:49  X-Ray Chest AP Portable  Narrative: EXAMINATION:  XR CHEST AP PORTABLE    CLINICAL HISTORY:  Stroke;, .    COMPARISON:  None available    FINDINGS:  An AP view or more reveals the heart to be mildly enlarged.  The trachea is midline.  Pulmonary vasculature is prominent.  No consolidative infiltrate or effusion is seen.  Degenerative changes are noted to the thoracic spine.  Impression: 1. Mild cardiomegaly  2. Prominent pulmonary vasculature  3. Thoracic spondylosis    Electronically signed by: Andres Romero  Date:    01/25/2023  Time:    09:25  CT Head Without Contrast  Narrative: EXAMINATION:  CT of the head without contrast    CLINICAL HISTORY:  Right-sided weakness, numbness    TECHNIQUE:  Routine CT of the head was performed without intravenous contrast    Total DLP: 1787 mGy.cm    Automatic exposure control was utilized to reduce the patient's dose    COMPARISON:  None    FINDINGS:  There is no acute intracranial hemorrhage, midline shift, mass-effect, or extra-axial collection.  The ventricular system is normal in size and configuration.  There  are mild patchy areas of decreased attenuation in the periventricular, deep, subcortical white matter, while nonspecific, most commonly sequela of chronic microvascular ischemia.  No sulcal effacement.  Gray-white matter differentiation is preserved.  Visualized osseous structures are intact.  Visualized paranasal sinuses and mastoid air cells are clear  Impression: No acute intracranial abnormality.    Supratentorial white matter changes, while nonspecific, most likely sequela of chronic microvascular ischemia.    Electronically signed by: Sunil Coffman MD  Date:    01/25/2023  Time:    07:53           ASSESSMENT/PLAN:       CVA (cerebral vascular accident)    Primary hypertension    Acute combined systolic and diastolic congestive heart failure    Hyperglycemia       - cont current  - rehab pending        VTE Risk Mitigation (From admission, onward)           Ordered     IP VTE HIGH RISK PATIENT  Once         01/25/23 1649     Place sequential compression device  Until discontinued         01/25/23 1649                           Woodrow Darby MD  Hospital Medicine   [facility]

## 2023-02-06 NOTE — PT/OT/SLP PROGRESS
Occupational Therapy   Treatment    Name: Manpreet Josue  MRN: [id number]  Admitting Diagnosis:  CVA (cerebral vascular accident)       Recommendations:     Discharge Recommendations: rehabilitation facility  Discharge Equipment Recommendations:     Barriers to discharge:       Assessment:     Manpreet Josue is a 52 y.o. male with a medical diagnosis of CVA (cerebral vascular accident).  He presents with Performance deficits affecting function are weakness, impaired endurance, impaired self care skills.     Pt seen with son present. Worked on bed mobility tasks and UED with adaptive techniques. Pt able to t/f to R weak side with Bobby and noted improving balance while standing. Pot with NDT ac of WB, tapping and quick stretch to RUE while in semireclining position in chair. Pt with noted trace movements in shoulder elevation and scapula retraction as well as minimal trace activation in bicep and digits flexion once. Pt with noted good memory of self PROM and good carryover of tasks. Pt would benefit greatly from intensive rehab placement to increase ability to use RUE.     Rehab Prognosis:  Good; patient would benefit from acute skilled OT services to address these deficits and reach maximum level of function.       Plan:     Patient to be seen 3 x/week to address the above listed problems via self-care/home management, therapeutic activities, therapeutic exercises, neuromuscular re-education  Plan of Care Expires:    Plan of Care Reviewed with: patient, son    Subjective     Pain/Comfort:       Objective:     Communicated with: nursing prior to session.  Patient found HOB elevated with   upon OT entry to room.    General Precautions: Standard,      Orthopedic Precautions:   Braces:    Respiratory Status: Room air     Occupational Performance:     Bed Mobility:    Patient completed Rolling/Turning to Left with  modified independence  Patient completed Rolling/Turning to Right with modified independence  Patient  completed Supine to Sit with contact guard assistance and minimum assistance     Functional Mobility/Transfers:  Patient completed Bed <> Chair Transfer using Stand Pivot technique with minimum assistance with hand-held assist  AMPAC 6 Click ADL:      Treatment & Education:  See above    Patient left up in chair with all lines intact, call button in reach, and son present    GOALS:   Multidisciplinary Problems       Occupational Therapy Goals          Problem: Occupational Therapy    Goal Priority Disciplines Outcome Interventions   Occupational Therapy Goal     OT, PT/OT Ongoing, Progressing    Description: Goals to be met by: 2/16/23     Patient will increase functional independence with ADLs by performing:    Feeding with Set-up Assistance.  Grooming while seated with Set-up Assistance and use of adaptive techniques  Sitting at edge of bed x20 minutes with Contact Guard Assistance.  Increased functional strength to 4/5 for increased (I) with ADLs with LUE; RUE AROM to increase as able.                         Time Tracking:     OT Date of Treatment: 02/03/23  OT Start Time: 1515  OT Stop Time: 1545  OT Total Time (min): 30 min    Billable Minutes:Self Care/Home Management 15  Neuromuscular Re-education 15    OT/SAUNDRA: OT          2/6/2023

## 2023-02-06 NOTE — PLAN OF CARE
Problem: Adult Inpatient Plan of Care  Goal: Plan of Care Review  Outcome: Ongoing, Progressing  Goal: Patient-Specific Goal (Individualized)  Outcome: Ongoing, Progressing  Goal: Absence of Hospital-Acquired Illness or Injury  Outcome: Ongoing, Progressing  Goal: Optimal Comfort and Wellbeing  Outcome: Ongoing, Progressing  Goal: Readiness for Transition of Care  Outcome: Ongoing, Progressing     Problem: Balance Impairment (Functional Deficit)  Goal: Improved Balance and Postural Control  Outcome: Ongoing, Progressing     Problem: Adjustment to Illness (Stroke, Ischemic/Transient Ischemic Attack)  Goal: Optimal Coping  Outcome: Ongoing, Progressing     Problem: Cerebral Tissue Perfusion (Stroke, Ischemic/Transient Ischemic Attack)  Goal: Optimal Cerebral Tissue Perfusion  Outcome: Ongoing, Progressing     Problem: Communication Impairment (Stroke, Ischemic/Transient Ischemic Attack)  Goal: Improved Communication Skills  Outcome: Ongoing, Progressing     Problem: Functional Ability Impaired (Stroke, Ischemic/Transient Ischemic Attack)  Goal: Optimal Functional Ability  Outcome: Ongoing, Progressing     Problem: Skin Injury Risk Increased  Goal: Skin Health and Integrity  Outcome: Ongoing, Progressing     Problem: Balance Impairment (Functional Deficit)  Goal: Improved Balance and Postural Control  Outcome: Ongoing, Progressing     Problem: Muscle Strength Impairment (Functional Deficit)  Goal: Improved Muscle Strength  Outcome: Ongoing, Progressing     Problem: Balance Impairment (Functional Deficit)  Goal: Improved Balance and Postural Control  Outcome: Ongoing, Progressing     Problem: Coordination Impairment (Functional Deficit)  Goal: Optimal Coordination  Outcome: Ongoing, Progressing     Problem: Muscle Strength Impairment (Functional Deficit)  Goal: Improved Muscle Strength  Outcome: Ongoing, Progressing     Problem: Muscle Tone Impairment (Functional Deficit)  Goal: Improved Muscle Tone  Outcome: Ongoing,  Progressing     Problem: Range of Motion Impairment (Functional Deficit)  Goal: Optimal Range of Motion  Outcome: Ongoing, Progressing     Problem: Diabetes Comorbidity  Goal: Blood Glucose Level Within Targeted Range  Outcome: Ongoing, Progressing     Problem: Fall Injury Risk  Goal: Absence of Fall and Fall-Related Injury  Outcome: Ongoing, Progressing

## 2023-02-06 NOTE — PT/OT/SLP PROGRESS
Speech Language Pathology Treatment    Patient Name:  Manpreet Josue   MRN:  [id number]  Admitting Diagnosis: CVA (cerebral vascular accident)    Recommendations:                 General Recommendations:  Speech/language therapy  Diet recommendations:  Soft & Bite Sized Diet - IDDSI Level 6, Liquid Diet Level: Thin liquids - IDDSI Level 0   General Precautions: Standard,    Communication strategies:   Increased volume w/over-articulation    Subjective     Pt alert in chair and compliant w/ST.   Objective:     Skilled intervention focused on Dysarthria tx    Assessment:     Manpreet Josue is a 52 y.o. male with an SLP diagnosis of Dysarthria.  He presents with mild motor speech deficits. Pt reported he utilizes HEP and loud speech. Pt and ST participated in simple conversational turn-taking and the Pt was aware of increasing volume which increased his speech intelli.  Pt presented x2 words that he had a difficult time pronouncing, but Pt was able to self-correct and verbalize those words w/intelligibility. Pt completed labial protrusion and hold for 5 seconds x20. Pt puffed out cheeks and alternated air w/mild difficulty w/labial closure. Pt completed labial protrusion w/lateralization x20 w/severe difficulty, and completed labial retraction x20. Pt edu to cont HEP and Pt verbalized agreement.     Goals:   Multidisciplinary Problems       SLP Goals          Problem: SLP    Goal Priority Disciplines Outcome   SLP Goal     SLP    Description: Pt's oral motor strength and ROM will increase as noted in ROM exercises and speech exercises on R side.   Pt's speech will increase to 90% w/o the visual of Pt's articulators and w/environmental noises.                                 Plan:     Patient to be seen:  3 x/week   Plan of Care expires:  02/24/23  Plan of Care reviewed with:  patient and nursing  SLP Follow-Up:  Yes        Time Tracking:     SLP Treatment Date:   02/06/23  Speech Start Time:  1500  Speech Stop Time:   1555     Speech Total Time (min):  55 min    Billable Minutes: Speech Therapy Individual      02/06/2023

## 2023-02-06 NOTE — PT/OT/SLP PROGRESS
Physical Therapy Treatment    Patient Name:  Manpreet Josue   MRN:  [id number]    Recommendations:     Discharge Recommendations: rehabilitation facility  Discharge Equipment Recommendations: walker, rolling  Barriers to discharge: None    Assessment:     Manpreet Josue is a 52 y.o. male admitted with a medical diagnosis of CVA (cerebral vascular accident).  He presents with the following impairments/functional limitations: weakness, impaired endurance, impaired functional mobility, gait instability, impaired balance, decreased lower extremity function, decreased upper extremity function, decreased safety awareness, impaired self care skills, impaired coordination, decreased ROM, impaired sensation.     Pt able to stand to RW, assistance from therapist to keep RUE situated on walker. He was able to hop with the LLE a few feet with Mod A for balance. He then pivoted to chair with assistance from therapist to move AD. He was able to safely lower himself to chair with CGA from therapist. He has potential to ambulate with appropriate orthotics and facilitation of proper muscles.He is making good progress; however his progress will stagnate or possibly decline without proper intensive therapy from an inpatient rehabilitation facility.     Rehab Prognosis: Good; patient would benefit from acute skilled PT services to address these deficits and reach maximum level of function.    Recent Surgery: * No surgery found *      Plan:     During this hospitalization, patient to be seen daily to address the identified rehab impairments via gait training, therapeutic activities, therapeutic exercises and progress toward the following goals:    Plan of Care Expires:  02/23/23    Subjective     Chief Complaint: weakness to R  Patient/Family Comments/goals: to be able to get PT before returning home  Pain/Comfort:         Objective:     Communicated with patient prior to session.  Patient found HOB elevated with   upon PT entry to room.      General Precautions: Standard, fall  Orthopedic Precautions:    Braces:    Respiratory Status: Room air     Functional Mobility:  Bed Mobility:     Supine to Sit: contact guard assistance  Transfers:     Sit to Stand:  minimum assistance with rolling walker  Bed to Chair: minimum assistance with  rolling walker  using  hop transfer  Balance: Min A static standing to RW      AM-PAC 6 CLICK MOBILITY          Treatment & Education:  See above    Patient left up in chair with all lines intact, call button in reach, and wife present..    GOALS:   Multidisciplinary Problems       Physical Therapy Goals          Problem: Physical Therapy    Goal Priority Disciplines Outcome Goal Variances Interventions   Physical Therapy Goal     PT, PT/OT Ongoing, Progressing     Description: Goals to be met by: 23     Patient will increase functional independence with mobility by performin. Supine to sit with Stand-by Assistance  2. Sit to stand transfer with Stand-by Assistance  3. Gait  x 50 feet with Contact Guard Assistance using Rolling Walker.                          Time Tracking:     PT Received On: 23  PT Start Time: 1345    PT Stop Time: 1405  PT Total Time (min): 20 min     Billable Minutes: Therapeutic Activity 20    Treatment Type: Evaluation  PT/PTA: PT           2023

## 2023-02-06 NOTE — PT/OT/SLP PROGRESS
Occupational Therapy   Treatment    Name: Manpreet Josue  MRN: [id number]  Admitting Diagnosis:  CVA (cerebral vascular accident)       Recommendations:     Discharge Recommendations: rehabilitation facility  Discharge Equipment Recommendations:     Barriers to discharge:       Assessment:     Manpreet Josue is a 52 y.o. male with a medical diagnosis of CVA (cerebral vascular accident).  He presents with Performance deficits affecting function are weakness, impaired endurance, impaired self care skills.     Pt and wife educate don safe transfer training chair<>BSC<>EOB. Pt with verbal cueing to increase safety and to take his time. Pt tends to attempt to move feet before all the way standing and causing balance deficits. Pt with improved balance when taking his time. Toileting with maxA to manage clothes and Bobyb with personal hygiene.     Rehab Prognosis:  Good; patient would benefit from acute skilled OT services to address these deficits and reach maximum level of function.       Plan:     Patient to be seen 3 x/week to address the above listed problems via self-care/home management, therapeutic activities, therapeutic exercises, neuromuscular re-education  Plan of Care Expires:    Plan of Care Reviewed with: patient, spouse    Subjective     Pain/Comfort:       Objective:     Communicated with: nursing prior to session.  Patient found up in chair with   upon OT entry to room.    General Precautions: Standard,      Orthopedic Precautions:   Braces:    Respiratory Status: Room air     Occupational Performance:     Bed Mobility:    Patient completed Rolling/Turning to Left with  modified independence  Patient completed Rolling/Turning to Right with modified independence  Patient completed Sit to Supine with minimum assistance     Functional Mobility/Transfers:  Patient completed Bed <> Chair Transfer using Stand Pivot technique with minimum assistance with hand-held assist  Patient completed Chair <> Mat Stand Pivot  technique with minimum assistance with hand-held assist      Activities of Daily Living:  Toileting: minimum assistance and maximal assistance        AMPAC 6 Click ADL:      Treatment & Education:  See above    Patient left HOB elevated with all lines intact and call button in reach    GOALS:   Multidisciplinary Problems       Occupational Therapy Goals          Problem: Occupational Therapy    Goal Priority Disciplines Outcome Interventions   Occupational Therapy Goal     OT, PT/OT Ongoing, Progressing    Description: Goals to be met by: 2/16/23     Patient will increase functional independence with ADLs by performing:    Feeding with Set-up Assistance.  Grooming while seated with Set-up Assistance and use of adaptive techniques  Sitting at edge of bed x20 minutes with Contact Guard Assistance.  Increased functional strength to 4/5 for increased (I) with ADLs with LUE; RUE AROM to increase as able.                         Time Tracking:     OT Date of Treatment: 02/06/23  OT Start Time: 1600  OT Stop Time: 1630  OT Total Time (min): 30 min    Billable Minutes:Self Care/Home Management 30    OT/SAUNDRA: OT          2/6/2023

## 2023-02-07 LAB
POCT GLUCOSE: 162 MG/DL (ref 70–110)
POCT GLUCOSE: 260 MG/DL (ref 70–110)

## 2023-02-07 PROCEDURE — 21400001 HC TELEMETRY ROOM

## 2023-02-07 PROCEDURE — 97530 THERAPEUTIC ACTIVITIES: CPT

## 2023-02-07 PROCEDURE — 25000003 PHARM REV CODE 250: Performed by: INTERNAL MEDICINE

## 2023-02-07 PROCEDURE — 25000003 PHARM REV CODE 250: Performed by: NURSE PRACTITIONER

## 2023-02-07 PROCEDURE — 92507 TX SP LANG VOICE COMM INDIV: CPT

## 2023-02-07 PROCEDURE — 94761 N-INVAS EAR/PLS OXIMETRY MLT: CPT

## 2023-02-07 PROCEDURE — 97535 SELF CARE MNGMENT TRAINING: CPT

## 2023-02-07 PROCEDURE — 63600175 PHARM REV CODE 636 W HCPCS: Performed by: INTERNAL MEDICINE

## 2023-02-07 RX ORDER — ATORVASTATIN CALCIUM 40 MG/1
80 TABLET, FILM COATED ORAL DAILY
Status: DISCONTINUED | OUTPATIENT
Start: 2023-02-07 | End: 2023-02-08 | Stop reason: HOSPADM

## 2023-02-07 RX ADMIN — METOPROLOL SUCCINATE 50 MG: 50 TABLET, EXTENDED RELEASE ORAL at 09:02

## 2023-02-07 RX ADMIN — ATORVASTATIN CALCIUM 80 MG: 40 TABLET, FILM COATED ORAL at 01:02

## 2023-02-07 RX ADMIN — CLOPIDOGREL BISULFATE 75 MG: 75 TABLET ORAL at 09:02

## 2023-02-07 RX ADMIN — INSULIN ASPART 3 UNITS: 100 INJECTION, SOLUTION INTRAVENOUS; SUBCUTANEOUS at 06:02

## 2023-02-07 RX ADMIN — INSULIN ASPART 3 UNITS: 100 INJECTION, SOLUTION INTRAVENOUS; SUBCUTANEOUS at 02:02

## 2023-02-07 RX ADMIN — LISINOPRIL 40 MG: 20 TABLET ORAL at 09:02

## 2023-02-07 NOTE — PT/OT/SLP PROGRESS
Speech Language Pathology Treatment    Patient Name:  Manpreet Josue   MRN:  [id number]  Admitting Diagnosis: CVA (cerebral vascular accident)    Recommendations:                 General Recommendations:  Speech/language therapy  Diet recommendations:  Soft & Bite Sized Diet - IDDSI Level 6, Liquid Diet Level: Thin liquids - IDDSI Level 0   General Precautions: Standard,    Communication strategies:   Increased volume w/over-articulation    Subjective   ST had visited briefly w/Pt earlier but was starting to eat lunch. ST returned later  Pt alert in bed and compliant w/ST.   Objective:     Skilled intervention focused on Dysarthria tx    Assessment:     Manpreet Josue is a 52 y.o. male with an SLP diagnosis of Dysarthria.  He presents with mild motor speech deficits. Pt reported he utilizes HEP and loud speech. Pt and ST participated in simple conversational turn-taking and the Pt was aware of increasing volume which increased his speech intelli.  Pt presented x1 word that he had a difficult time pronouncing, but Pt was able to self-correct and verbalize those words w/intelligibility. Pt completed labial protrusion x20, lip smacks x20, and labial lateralization x20. ST edu to cont HEP.  Goals:   Multidisciplinary Problems       SLP Goals          Problem: SLP    Goal Priority Disciplines Outcome   SLP Goal     SLP    Description: Pt's oral motor strength and ROM will increase as noted in ROM exercises and speech exercises on R side.   Pt's speech will increase to 90% w/o the visual of Pt's articulators and w/environmental noises.                                 Plan:     Patient to be seen:  3 x/week   Plan of Care expires:  02/24/23  Plan of Care reviewed with:  patient and nursing  SLP Follow-Up:  Yes        Time Tracking:     SLP Treatment Date:   02/07/23  Speech Start Time:  1415  Speech Stop Time:  1455     Speech Total Time (min):  40 min    Billable Minutes: Speech Therapy Individual      02/07/2023

## 2023-02-07 NOTE — PROGRESS NOTES
[facility]  Cardiology  Progress Note    Patient Name: Manpreet Josue  MRN: [id number]  Admission Date: 1/25/2023  Hospital Length of Stay: 13 days  Code Status: Full Code   Attending Provider: Woodrow Darby MD   Consulting Provider: GENE Duke  Primary Care Physician: Primary Doctor No  Principal Problem:CVA (cerebral vascular accident)    Patient information was obtained from patient, ER records, and primary team.       Subjective:     Chief Complaint: Right sided weakness     HPI: Patient is a 53 yo male unknown to our services.  His symptoms started yesterday morning with some disorientation and difficulty speaking.  He also had some weakness in his right hand and right leg.  He decided that he should come into the ER for an evaluation.  Upon arrival systolic blood pressure was 220/125, EKG was done which showed a normal sinus rhythm and some lateral T-wave inversions.  Lab work showed a minimally elevated troponin that tapered down with the 2nd set.  Initial CT scan was negative.  Chest x-ray was consistent with some vascular congestion.  Carotid ultrasound was negative.  MRI did show left thalamic infarct.  He was seen by vascular Neurology who loaded him with Plavix aspirin and high-intensity statin.      1/26/23: Patient sitting up in chair eating lunch without distress. He denies any CP or SOB. He remains on cardene gtt for BP.   1/27/23: Patient in bed without distress. He denies any CP or SOB.  1/30/23: Patient resting in bed without distress. He is off Cardene gtt. He denies any CP or SOB.   1/31/23: Patient working with PT. He denies any complaints.   2/7/23: Patient resting in bed without distress. He denies any CP or SOB. He is awaiting rehab placement.       No current facility-administered medications on file prior to encounter.     No current outpatient medications on file prior to encounter.     Tobacco Use    Smoking status: Not on file    Smokeless tobacco: Not on file    Substance and Sexual Activity    Alcohol use: Not on file    Drug use: Not on file    Sexual activity: Not on file     Review of Systems   Constitutional: Negative.   Cardiovascular: Negative.    Respiratory: Negative.     Skin: Negative.    Musculoskeletal:  Positive for muscle weakness.   Gastrointestinal: Negative.    Neurological:         Right upper and lower extremity weakness, right facial droop    Psychiatric/Behavioral: Negative.     Allergic/Immunologic: Negative.    Objective:     Vital Signs (Most Recent):  Temp: 97.7 °F (36.5 °C) (02/07/23 0828)  Pulse: 69 (02/07/23 0828)  Resp: 20 (02/07/23 0828)  BP: (!) 162/89 (02/07/23 0828)  SpO2: 98 % (02/07/23 0828)   Vital Signs (24h Range):  Temp:  [97.5 °F (36.4 °C)-98.2 °F (36.8 °C)] 97.7 °F (36.5 °C)  Pulse:  [64-78] 69  Resp:  [18-20] 20  SpO2:  [93 %-99 %] 98 %  BP: (132-166)/(73-94) 162/89     Weight: 114.3 kg (251 lb 15.8 oz)  Body mass index is 37.21 kg/m².    SpO2: 98 %         Intake/Output Summary (Last 24 hours) at 2/7/2023 0853  Last data filed at 2/7/2023 0800  Gross per 24 hour   Intake 900 ml   Output 350 ml   Net 550 ml         Lines/Drains/Airways       Peripheral Intravenous Line  Duration                  Peripheral IV - Single Lumen Anterior;Distal;Left Upper Arm -- days                    Physical Exam  Constitutional:       Appearance: Normal appearance. He is obese.   Eyes:      Extraocular Movements: Extraocular movements intact.   Cardiovascular:      Rate and Rhythm: Normal rate and regular rhythm.   Pulmonary:      Effort: Pulmonary effort is normal.      Breath sounds: Normal breath sounds.   Musculoskeletal:      Comments: R sided hemiparesis   Skin:     General: Skin is warm and dry.   Neurological:      Mental Status: He is alert.      Motor: Weakness present.   Psychiatric:         Mood and Affect: Mood normal.         Behavior: Behavior normal.       Significant Imaging:   Echocardiogram 1/26/23:  The left ventricle is mildly  enlarged with moderate concentric hypertrophy and severely decreased systolic function.  The estimated ejection fraction is 26%.  Grade III left ventricular diastolic dysfunction.  Mild tricuspid regurgitation.  Mild mitral regurgitation.  Mild right ventricular enlargement with mildly reduced right ventricular systolic function.  Moderate left atrial enlargement.  Mild right atrial enlargement.  Elevated central venous pressure (15 mmHg).  The estimated PA systolic pressure is 47 mmHg.  There is pulmonary hypertension.    Assessment and Plan:       1. Acute CVA w/ right sided hemiparesis     - Continue aspirin and plavix     - Monitor Tele     - Continue therapy  2. Cardiomyopathy     - Continue Toprol 50 mg daily and Lisinopril 40 mg daily     - LifeVest denied by insurance     - Ischemic evaluation as outpatient  3. HTN Urgency - BP stable     - He was on Cardene gtt at admission     - Continue BB and ACEi. Up titrate as needed  4. Elevated Troponin     - Patient denies CP and trend has remained flat     - Type II MI s/t CVA and hypertensive urgency       Please call with any further questions  We will arrange for follow up in clinic    GENE Duke  Cardiology   [facility] - Emergency Dept

## 2023-02-07 NOTE — PROGRESS NOTES
Progress Note         Hospital follow up    Subjective:     CC right sided weakness    Pt seen sitting up in chair, no acute issues noted today.     Current Facility-Administered Medications   Medication Dose Route Frequency Provider Last Rate Last Admin    aspirin chewable tablet 324 mg  324 mg Oral Once Alexey Paniagua MD        clopidogreL tablet 75 mg  75 mg Oral Daily Alexey Paniagua MD   75 mg at 02/07/23 0926    dextrose 10% bolus 125 mL 125 mL  12.5 g Intravenous PRN Woodrow Walls MD        dextrose 10% bolus 250 mL 250 mL  25 g Intravenous PRN Woodrow Walls MD        glucagon (human recombinant) injection 1 mg  1 mg Intramuscular PRN Woodrow Walls MD        glucose chewable tablet 16 g  16 g Oral PRN Woodrow Walls MD        glucose chewable tablet 24 g  24 g Oral PRN Woodrow Walls MD        insulin aspart U-100 injection 0-5 Units  0-5 Units Subcutaneous QID (AC + HS) PRN Woodrow Walls MD   1 Units at 02/06/23 1944    lisinopriL tablet 40 mg  40 mg Oral Daily Leeroy Combs, FNP   40 mg at 02/07/23 0926    LORazepam tablet 1 mg  1 mg Oral Q6H PRN Alexey Paniagua MD   1 mg at 02/01/23 0844    metoprolol succinate (TOPROL-XL) 24 hr tablet 50 mg  50 mg Oral Daily Woodrow Darby MD   50 mg at 02/07/23 0926    ondansetron injection 4 mg  4 mg Intravenous Q8H PRN Alexey Paniagua MD        sodium chloride 0.9% flush 10 mL  10 mL Intravenous PRN Alexey Paniagua MD               Review of Systems:     ROS    Objective:       VITAL SIGNS: 24 HR MIN & MAX LAST    Temp  Min: 97.5 °F (36.4 °C)  Max: 98.2 °F (36.8 °C)  97.7 °F (36.5 °C)        BP  Min: 132/86  Max: 166/94  (!) 162/89     Pulse  Min: 64  Max: 78  69     Resp  Min: 18  Max: 20  20    SpO2  Min: 93 %  Max: 99 %  98 %      Physical Exam      Constitutional:       General: He is not in acute distress.  Cardiovascular:      Rate and Rhythm: Normal rate and regular rhythm.   Pulmonary:      Effort: Pulmonary  effort is normal.      Breath sounds: Normal breath sounds.   Skin:     General: Skin is warm.   Neurological:      Mental Status: He is alert and oriented to person, place, and time.      Comments: RUE/RLE paresis, R facial droop       Assessment / Plan:     Active Hospital Problems    Diagnosis  POA    *CVA (cerebral vascular accident) [I63.9]  Yes    Hyperglycemia [R73.9]  Yes    Acute combined systolic and diastolic congestive heart failure [I50.41]  Yes    Primary hypertension [I10]  Yes      Resolved Hospital Problems   No resolved problems to display.     # CVA- stable right sided hemiparesis with facial droop  - on DAPT plavix and asa, recommend adding high dose statin  - pt/ot   - follow up with [facility] as [facility]    # HTN- stable currently  - continue current meds     # systolic heart failure- Ef 26%  - stable cardiac-  - ace and metoprolol    # DVT pplx     Barriers to DC: plan to dc to [facility]      Components of this note were documented using voice recognition systems; and are subject to errors not corrected at proof reading.  Please contact the author for any clarifications.

## 2023-02-07 NOTE — PT/OT/SLP PROGRESS
Physical Therapy Treatment    Patient Name:  Manpreet Josue   MRN:  [id number]    Recommendations:     Discharge Recommendations: rehabilitation facility  Discharge Equipment Recommendations: walker, rolling  Barriers to discharge: None    Assessment:     Manpreet Josue is a 52 y.o. male admitted with a medical diagnosis of CVA (cerebral vascular accident).  He presents with the following impairments/functional limitations: weakness, impaired endurance, impaired functional mobility, gait instability, impaired balance, decreased lower extremity function, decreased upper extremity function, decreased safety awareness, impaired self care skills, impaired coordination, decreased ROM, impaired sensation.     Pt just returning to bed with assistance from staff. He had been up for 4+ hours and was very fatigued. He was agreeable to supine exercises. He presented with increased strength to hip flexors and extensors today with ROM. He did really well with bridging in supine.     Rehab Prognosis: Good; patient would benefit from acute skilled PT services to address these deficits and reach maximum level of function.    Recent Surgery: * No surgery found *      Plan:     During this hospitalization, patient to be seen daily to address the identified rehab impairments via gait training, therapeutic activities, therapeutic exercises and progress toward the following goals:    Plan of Care Expires:  02/23/23    Subjective     Chief Complaint: weakness to R  Patient/Family Comments/goals: to be able to get PT before returning home  Pain/Comfort:         Objective:     Communicated with patient prior to session.  Patient found HOB elevated with telemetry, pulse ox (continuous), peripheral IV, PureWick upon PT entry to room.     General Precautions: Standard, fall  Orthopedic Precautions:    Braces:    Respiratory Status: Room air     Functional Mobility:  Bed Mobility:     Rolling Left:  moderate assistance  Rolling Right: contact  guard assistance  Bridging: moderate assistance      AM-PAC 6 CLICK MOBILITY          Treatment & Education:  See above    Patient left HOB elevated with all lines intact, call button in reach, and wife present..    GOALS:   Multidisciplinary Problems       Physical Therapy Goals          Problem: Physical Therapy    Goal Priority Disciplines Outcome Goal Variances Interventions   Physical Therapy Goal     PT, PT/OT Ongoing, Progressing     Description: Goals to be met by: 23     Patient will increase functional independence with mobility by performin. Supine to sit with Stand-by Assistance  2. Sit to stand transfer with Stand-by Assistance  3. Gait  x 50 feet with Contact Guard Assistance using Rolling Walker.                          Time Tracking:     PT Received On: 23  PT Start Time: 1410    PT Stop Time: 1425  PT Total Time (min): 15 min     Billable Minutes: Therapeutic Activity 15    Treatment Type: Treatment  PT/PTA: PTA           2023

## 2023-02-07 NOTE — PLAN OF CARE
Called Deysi at [facility] to see if they rec'd auth on pt yet from Medicaid and she stated that she just checked and they still pending auth.    Pt is authorized and yesterday, [facility] rec'd medicaid auth, however, wife wants pt to go to [facility] and that was her first choice. Once auth is rec'd at [facility], we will be able to d/c pt.

## 2023-02-07 NOTE — PROGRESS NOTES
[facility] Medicine Progress Note    Patient Name: Manpreet Josue  Age: 52 y.o.   MRN: [id number]  Admission Date: 1/25/2023  7:08 AM   Patient Class: IP- Inpatient  Benefits: Payor: MEDICAID / Plan: [place] / Product Type: Managed Medicaid /    Primary Care Provider: Primary Doctor No   Pharmacy:   [place] [address] [facility] - [address]  [address] [place] [address]  Phone: [phone] Fax: [phone]    Code Status: Full Code      Chief Complaint:   Chief Complaint   Patient presents with    Cerebrovascular Accident     Pt and wife report pt having slurred speech and rt sided weakness with difficulty walking. Rt sided facial droop noted. Symptom started yesterday am.        Admit Diagnosis:   CVA (cerebral vascular accident) [I63.9]  Stroke [I63.9]     HPI:  52 year old male with non significant medical history who presented to ED accompanied by his wife with complaints of worsening symptoms of slurred speech, right facial droop and right sided weakness. They admitted last known normal was Monday night prior to going to bed and noted symptoms on Tuesday but did not present for evaluation. He denies any positive sick contact. Denies fever, chills, N/V/D   *As documented in Admit H&P by Woodrow Darby MD    SUBJECTIVE:     Follow-up:  CVA (cerebral vascular accident)    Hospital Coarse:  1/26/23-The patient is still on a Cardene drip.  Will start titrating meds tomorrow which would have allowed for the permissive HTN.  He still has deficit.  Cardiology also saw him for his CHF.  Will need CM involvement due to lack of insurance for the patient.  I did have a lengthy discussion with the wife.    1/27/23-Patient is doing well at this time.  He is still on a Cardene drip.  Will start adding additional oral meds to help with his BP.  Therapy is still working with him as well.    1/28/23-Patient is resting at this time.  His BP is much improved.   Will try top wean off the Cardene drip today.  Once he is stable off the drip then he can move to the floor.  Will also start a sliding scale because his HBA1C is 8.7.  Hopefully he can control diet with diet which will also be changed.    1/29  - Awake, alert, wife present. Updated on condition and plan. All questions answered. Off Cardene gtt this 2 am. BP still running a little high. Increase Toprol XL to 50mg daily. Cont ASA/Plavix. Add Metformin for DM2. Downgrade to floor.    1/30  - No complaints. Feels well. Home soon.    1/31  - Feels well. No acute events.     2/1  - Cont with Right paresis. PT to teach wife how to take care of pt at home and DME recs.    2/2  - No new complaints. Home soon.    2/3  - No new complaints. Pt now has Medicaid. Rehab referral.    2/4  - Awaiting rehab. No complaints.    2/5  - Feels well. No new complaints or events. Rehab pending.    2/6  - No changes or new complaints. Awaiting rehab placement.         OBJECTIVE:     Vital Signs (Most Recent)  Temp: 97.5 °F (36.4 °C) (02/07/23 0011)  Pulse: 71 (02/07/23 0011)  Resp: 18 (02/06/23 2000)  BP: 132/86 (02/07/23 0011)  SpO2: 98 % (02/07/23 0011)      Vital Signs Range (Last 24H):  Temp:  [97.5 °F (36.4 °C)-98.3 °F (36.8 °C)]   Pulse:  [64-76]   Resp:  [18-20]   BP: (132-166)/(73-94)   SpO2:  [93 %-98 %]  .vi    I & O (Last 24H):  Intake/Output Summary (Last 24 hours) at 2/7/2023 0040  Last data filed at 2/6/2023 1435  Gross per 24 hour   Intake 840 ml   Output 350 ml   Net 490 ml         Scheduled Meds:   aspirin  324 mg Oral Once    clopidogreL  75 mg Oral Daily    lisinopriL  40 mg Oral Daily    metoprolol succinate  50 mg Oral Daily     Continuous Infusions:  PRN Meds:   dextrose 10%    dextrose 10%    glucagon (human recombinant)    glucose    glucose    insulin aspart U-100    LORazepam    ondansetron    sodium chloride 0.9%      Lines/Drains:   Lines/Drains/Airways       None                       Physical  Exam  Constitutional:       General: He is not in acute distress.  HENT:      Head: Normocephalic and atraumatic.   Cardiovascular:      Rate and Rhythm: Normal rate and regular rhythm.   Pulmonary:      Effort: Pulmonary effort is normal.      Breath sounds: Normal breath sounds.   Skin:     General: Skin is warm.   Neurological:      Mental Status: He is alert and oriented to person, place, and time.      Comments: RUE/RLE paresis, R facial droop        No results for input(s): WBC, HGB, HCT, PLT, MCV, PT, PTT, INR in the last 168 hours.     No results for input(s): NA, K, CHLORIDE, CO2, BUN, CREATININE, GLUCOSE, MG, PHOS, CALCIUM, ALBUMIN, PROT, BILITOT, ALKPHOS, AST, ALT, CRP, BNP, CPK, FERRITIN in the last 168 hours.    Invalid input(s): TROP    Lab Results   Component Value Date    HGBA1C 8.7 (H) 01/28/2023        Echo Saline Bubble? No  · The left ventricle is mildly enlarged with moderate concentric   hypertrophy and severely decreased systolic function.  · The estimated ejection fraction is 26%.  · Grade III left ventricular diastolic dysfunction.  · Mild tricuspid regurgitation.  · Mild mitral regurgitation.  · Mild right ventricular enlargement with mildly reduced right ventricular   systolic function.  · Moderate left atrial enlargement.  · Mild right atrial enlargement.  · Elevated central venous pressure (15 mmHg).  · The estimated PA systolic pressure is 47 mmHg.  · There is pulmonary hypertension.     MRI Brain Without Contrast  Narrative: EXAMINATION:  MRI BRAIN WITH CONTRAST:    CLINICAL HISTORY:  , Stroke, follow up;    COMPARISON:  None available    FINDINGS:  Serial axial,coronal, and sagittal 1.5 Carina images were obtained of the brain using T1 and T2- weighted techniques the administration of IV contrast. Ventricles, cisterns, and sulci are mildly prominent size.  There is no evidence of midline shift, mass effect, or abnormal extra-axial fluid collections.  Flow void is noted to the superior  sagittal sinus and visualized intracranial vessels on T2 sequence imaging.  The right vertebral artery slightly dominant.  There is minimal scattered mucosal thickening at the ethmoid sinuses.  Orbital globes are symmetric in size shape and signal intensity.  Cerebellar tonsils extend caudally to the level of the foramen magnum.  There is focal abnormal somewhat bilobed signal intensity at the posterior left corona radiata and superior left thalamus on diffusion-weighted imaging compatible with a focus of acute ischemia measuring approximately 2 x 1 cm.  No other focus of abnormal signal intensity is identified on diffusion weighted imaging.  There is mild motion artifact.  Scattered small foci of abnormal signal intensity are evident at the periventricular and subcortical white matter on FLAIR sequence imaging.  Impression: 1. Mild motion artifact  2. Somewhat bilobed focus of abnormal signal intensity at the posterior left corona radiata/superior left thalamus compatible with a focus of acute ischemia measuring approximately 2 x 1 cm  3. Generalized cerebral and cerebellar atrophy  4. Scattered foci of abnormal signal intensity at the periventricular and subcortical white matter suspicious for foci of ischemia versus gliosis    Electronically signed by: Andres Romero  Date:    01/25/2023  Time:    10:00  US Carotid Bilateral  Narrative: EXAMINATION:  US CAROTID BILATERAL    CLINICAL HISTORY:  , Cerebral infarction, unspecified.    COMPARISON:  None available    FINDINGS:  Real-time imaging was performed through the right and left carotid arteries using duplex Doppler imaging. NASCET utilized. Mild scattered plaque formation at the carotid bulbs and proximal internal are carotid arteries bilaterally with no significant stenosis identified.  Antegrade flow is evident within the vertebral arteries bilaterally.    MEASUREMENTS:    Right Systolic Velocities(cm/s):    Internal Carotid: 65.3 cm/second    Common Carotid:  78.1 cm/second    Right IC:CC Ratio: 0.8    Left Systolic Velocities(cm/s):    Internal Carotid: 77.9 cm/second    Common Carotid: 80.1 cm/second    Left IC:CC Ratio: 1.0  Impression: 1. No significant localized atherosclerotic plaque formation or stenosis noted to the visualized portions of the carotid arteries bilaterally.    Electronically signed by: Andres Romero  Date:    01/25/2023  Time:    09:49  X-Ray Chest AP Portable  Narrative: EXAMINATION:  XR CHEST AP PORTABLE    CLINICAL HISTORY:  Stroke;, .    COMPARISON:  None available    FINDINGS:  An AP view or more reveals the heart to be mildly enlarged.  The trachea is midline.  Pulmonary vasculature is prominent.  No consolidative infiltrate or effusion is seen.  Degenerative changes are noted to the thoracic spine.  Impression: 1. Mild cardiomegaly  2. Prominent pulmonary vasculature  3. Thoracic spondylosis    Electronically signed by: Andres Romero  Date:    01/25/2023  Time:    09:25  CT Head Without Contrast  Narrative: EXAMINATION:  CT of the head without contrast    CLINICAL HISTORY:  Right-sided weakness, numbness    TECHNIQUE:  Routine CT of the head was performed without intravenous contrast    Total DLP: 1787 mGy.cm    Automatic exposure control was utilized to reduce the patient's dose    COMPARISON:  None    FINDINGS:  There is no acute intracranial hemorrhage, midline shift, mass-effect, or extra-axial collection.  The ventricular system is normal in size and configuration.  There are mild patchy areas of decreased attenuation in the periventricular, deep, subcortical white matter, while nonspecific, most commonly sequela of chronic microvascular ischemia.  No sulcal effacement.  Gray-white matter differentiation is preserved.  Visualized osseous structures are intact.  Visualized paranasal sinuses and mastoid air cells are clear  Impression: No acute intracranial abnormality.    Supratentorial white matter changes, while nonspecific, most  likely sequela of chronic microvascular ischemia.    Electronically signed by: Sunil Coffman MD  Date:    01/25/2023  Time:    07:53           ASSESSMENT/PLAN:       CVA (cerebral vascular accident)    Primary hypertension    Acute combined systolic and diastolic congestive heart failure    Hyperglycemia       - cont current  - awaiting rehab        VTE Risk Mitigation (From admission, onward)           Ordered     IP VTE HIGH RISK PATIENT  Once         01/25/23 1649     Place sequential compression device  Until discontinued         01/25/23 1649                           Woodrow Darby MD  [facility] Medicine   [facility]

## 2023-02-07 NOTE — PLAN OF CARE
Problem: Adult Inpatient Plan of Care  Goal: Plan of Care Review  Outcome: Ongoing, Progressing  Goal: Patient-Specific Goal (Individualized)  Outcome: Ongoing, Progressing  Goal: Absence of Hospital-Acquired Illness or Injury  Outcome: Ongoing, Progressing  Goal: Optimal Comfort and Wellbeing  Outcome: Ongoing, Progressing  Goal: Readiness for Transition of Care  Outcome: Ongoing, Progressing     Problem: Balance Impairment (Functional Deficit)  Goal: Improved Balance and Postural Control  Outcome: Ongoing, Progressing     Problem: Balance Impairment (Functional Deficit)  Goal: Improved Balance and Postural Control  Outcome: Ongoing, Progressing     Problem: Muscle Strength Impairment (Functional Deficit)  Goal: Improved Muscle Strength  Outcome: Ongoing, Progressing     Problem: Balance Impairment (Functional Deficit)  Goal: Improved Balance and Postural Control  Outcome: Ongoing, Progressing     Problem: Coordination Impairment (Functional Deficit)  Goal: Optimal Coordination  Outcome: Ongoing, Progressing     Problem: Muscle Strength Impairment (Functional Deficit)  Goal: Improved Muscle Strength  Outcome: Ongoing, Progressing     Problem: Muscle Tone Impairment (Functional Deficit)  Goal: Improved Muscle Tone  Outcome: Ongoing, Progressing     Problem: Range of Motion Impairment (Functional Deficit)  Goal: Optimal Range of Motion  Outcome: Ongoing, Progressing     Problem: Adjustment to Illness (Stroke, Ischemic/Transient Ischemic Attack)  Goal: Optimal Coping  Outcome: Ongoing, Progressing     Problem: Cerebral Tissue Perfusion (Stroke, Ischemic/Transient Ischemic Attack)  Goal: Optimal Cerebral Tissue Perfusion  Outcome: Ongoing, Progressing     Problem: Communication Impairment (Stroke, Ischemic/Transient Ischemic Attack)  Goal: Improved Communication Skills  Outcome: Ongoing, Progressing     Problem: Functional Ability Impaired (Stroke, Ischemic/Transient Ischemic Attack)  Goal: Optimal Functional  Ability  Outcome: Ongoing, Progressing     Problem: Skin Injury Risk Increased  Goal: Skin Health and Integrity  Outcome: Ongoing, Progressing     Problem: Diabetes Comorbidity  Goal: Blood Glucose Level Within Targeted Range  Outcome: Ongoing, Progressing     Problem: Fall Injury Risk  Goal: Absence of Fall and Fall-Related Injury  Outcome: Ongoing, Progressing

## 2023-02-07 NOTE — PLAN OF CARE
[facility] to insurance and they are in process [facility] and that should rec'v this afternoon.  They will be ready for d/c tomorrow. Notified wife.

## 2023-02-08 VITALS
WEIGHT: 252 LBS | HEART RATE: 74 BPM | RESPIRATION RATE: 20 BRPM | SYSTOLIC BLOOD PRESSURE: 155 MMHG | TEMPERATURE: 98 F | DIASTOLIC BLOOD PRESSURE: 89 MMHG | BODY MASS INDEX: 37.33 KG/M2 | HEIGHT: 69 IN | OXYGEN SATURATION: 94 %

## 2023-02-08 PROBLEM — I50.41 ACUTE COMBINED SYSTOLIC AND DIASTOLIC CONGESTIVE HEART FAILURE: Status: RESOLVED | Noted: 2023-01-27 | Resolved: 2023-02-08

## 2023-02-08 PROBLEM — R73.9 HYPERGLYCEMIA: Status: RESOLVED | Noted: 2023-01-28 | Resolved: 2023-02-08

## 2023-02-08 PROBLEM — I63.9 CVA (CEREBRAL VASCULAR ACCIDENT): Status: RESOLVED | Noted: 2023-01-25 | Resolved: 2023-02-08

## 2023-02-08 PROBLEM — I10 PRIMARY HYPERTENSION: Status: RESOLVED | Noted: 2023-01-25 | Resolved: 2023-02-08

## 2023-02-08 LAB
POCT GLUCOSE: 156 MG/DL (ref 70–110)
POCT GLUCOSE: 171 MG/DL (ref 70–110)
POCT GLUCOSE: 221 MG/DL (ref 70–110)
POCT GLUCOSE: 262 MG/DL (ref 70–110)

## 2023-02-08 PROCEDURE — 94761 N-INVAS EAR/PLS OXIMETRY MLT: CPT

## 2023-02-08 PROCEDURE — 25000003 PHARM REV CODE 250: Performed by: INTERNAL MEDICINE

## 2023-02-08 PROCEDURE — 25000003 PHARM REV CODE 250: Performed by: NURSE PRACTITIONER

## 2023-02-08 RX ORDER — METFORMIN HYDROCHLORIDE 500 MG/1
500 TABLET ORAL 2 TIMES DAILY WITH MEALS
Qty: 180 TABLET | Refills: 3 | Status: ON HOLD | OUTPATIENT
Start: 2023-02-08 | End: 2023-05-04 | Stop reason: ALTCHOICE

## 2023-02-08 RX ORDER — METOPROLOL SUCCINATE 50 MG/1
50 TABLET, EXTENDED RELEASE ORAL DAILY
Qty: 30 TABLET | Refills: 11 | Status: SHIPPED | OUTPATIENT
Start: 2023-02-09 | End: 2024-03-15

## 2023-02-08 RX ORDER — LISINOPRIL 40 MG/1
40 TABLET ORAL DAILY
Qty: 90 TABLET | Refills: 3 | Status: SHIPPED | OUTPATIENT
Start: 2023-02-09 | End: 2023-05-02

## 2023-02-08 RX ORDER — ATORVASTATIN CALCIUM 80 MG/1
80 TABLET, FILM COATED ORAL DAILY
Qty: 90 TABLET | Refills: 3 | Status: SHIPPED | OUTPATIENT
Start: 2023-02-09 | End: 2024-03-15

## 2023-02-08 RX ORDER — CLOPIDOGREL BISULFATE 75 MG/1
75 TABLET ORAL DAILY
Qty: 30 TABLET | Refills: 11 | Status: SHIPPED | OUTPATIENT
Start: 2023-02-09 | End: 2024-03-15

## 2023-02-08 RX ORDER — NAPROXEN SODIUM 220 MG/1
324 TABLET, FILM COATED ORAL ONCE
Qty: 30 TABLET | Refills: 1 | Status: SHIPPED | OUTPATIENT
Start: 2023-02-08 | End: 2024-03-15

## 2023-02-08 RX ADMIN — CLOPIDOGREL BISULFATE 75 MG: 75 TABLET ORAL at 09:02

## 2023-02-08 RX ADMIN — ATORVASTATIN CALCIUM 80 MG: 40 TABLET, FILM COATED ORAL at 09:02

## 2023-02-08 RX ADMIN — METOPROLOL SUCCINATE 50 MG: 50 TABLET, EXTENDED RELEASE ORAL at 09:02

## 2023-02-08 RX ADMIN — LISINOPRIL 40 MG: 20 TABLET ORAL at 09:02

## 2023-02-08 NOTE — DISCHARGE SUMMARY
Discharge Summary    ADMISSION INFORMATION:     Admit Date: 1/25/2023    Admitting Physician: Woodrow Darby MD    Presenting Problem: CVA (cerebral vascular accident) [I63.9]  Stroke [I63.9]      Primary Care Physician: Primary Doctor No     Primary Care Phone: None   Consulting Provider(s):             DISCHARGE INFORMATION:     Discharge Date: 2/8/2023    Discharge Physician: No att. providers found     Primary Discharge Diagnosis: CVA (cerebral vascular accident)   Secondary Discharge Diagnosis:   Active Hospital Problems   No active problems to display.      Resolved Hospital Problems    Diagnosis Date Resolved POA    *CVA (cerebral vascular accident) [I63.9] 02/08/2023 Yes    Hyperglycemia [R73.9] 02/08/2023 Yes    Acute combined systolic and diastolic congestive heart failure [I50.41] 02/08/2023 Yes    Primary hypertension [I10] 02/08/2023 Yes              Discharge Condition: good       Discharge Disposition: to rehab      DETAILS OF HOSPITAL STAY:         ADMIT   PRESENTATION:         52 year old male with non significant medical history who presented to ED accompanied by his wife with complaints of worsening symptoms of slurred speech, right facial droop and right sided weakness. They admitted last known normal was Monday night prior to going to bed and noted symptoms on Tuesday but did not present for evaluation. He denies any positive sick contact. Denies fever, chills, N/V/D.       Hospital Course:   # CVA- stable right sided hemiparesis with facial droop  - on DAPT plavix and asa, recommend adding high dose statin  - pt/ot   - follow up [facility]  - stable for DC to rehab     # HTN- stable currently  - continue current meds      # systolic heart failure- Ef 26%  - stable cardiac-  - ace and metoprolol       Physical exam on the date of discharge:    Constitutional:       General: He is not in acute distress.  Cardiovascular:      Rate and Rhythm: Normal rate and regular rhythm.  "  Pulmonary:      Effort: Pulmonary effort is normal.      Breath sounds: Normal breath sounds.   Skin:     General: Skin is warm.   Neurological:      Mental Status: He is alert and oriented to person, place, and time.      Comments: RUE/RLE paresis, R facial droop       DISCHARGE PLAN:       No future appointments.     Portions of this note may have been created with voice recognition software. Occasional "wrong-word" or "sound-a-like" substitutions may have occurred due to the inherent limitations of voice recognition software.  Please, read the note carefully and recognize, using context, where substitutions have occurred.       Greater than 35 minutes     "

## 2023-02-08 NOTE — PLAN OF CARE
[facility]  [facility] can [facility] pt today and have rec'd medicaid auth.  Messaged Dr. Finn for d/c orders.

## 2023-02-08 NOTE — PLAN OF CARE
[facility]  Placed pt in will-call w/[facility]. Trip to be billed to insurance.  Gave info to nurse to call report.

## 2023-02-27 ENCOUNTER — OUTSIDE PLACE OF SERVICE (OUTPATIENT)
Dept: ADMINISTRATIVE | Facility: OTHER | Age: 53
End: 2023-02-27
Payer: MEDICAID

## 2023-02-27 PROCEDURE — 99232 SBSQ HOSP IP/OBS MODERATE 35: CPT | Mod: ,,, | Performed by: FAMILY MEDICINE

## 2023-02-27 PROCEDURE — 99232 PR SUBSEQUENT HOSPITAL CARE,LEVL II: ICD-10-PCS | Mod: ,,, | Performed by: FAMILY MEDICINE

## 2023-03-02 ENCOUNTER — OUTSIDE PLACE OF SERVICE (OUTPATIENT)
Dept: ADMINISTRATIVE | Facility: OTHER | Age: 53
End: 2023-03-02
Payer: MEDICAID

## 2023-03-02 PROCEDURE — 99232 PR SUBSEQUENT HOSPITAL CARE,LEVL II: ICD-10-PCS | Mod: ,,, | Performed by: FAMILY MEDICINE

## 2023-03-02 PROCEDURE — 99232 SBSQ HOSP IP/OBS MODERATE 35: CPT | Mod: ,,, | Performed by: FAMILY MEDICINE

## 2023-03-04 ENCOUNTER — OUTSIDE PLACE OF SERVICE (OUTPATIENT)
Dept: ADMINISTRATIVE | Facility: OTHER | Age: 53
End: 2023-03-04
Payer: MEDICAID

## 2023-03-04 PROCEDURE — 99232 PR SUBSEQUENT HOSPITAL CARE,LEVL II: ICD-10-PCS | Mod: ,,, | Performed by: FAMILY MEDICINE

## 2023-03-04 PROCEDURE — 99232 SBSQ HOSP IP/OBS MODERATE 35: CPT | Mod: ,,, | Performed by: FAMILY MEDICINE

## 2023-03-30 ENCOUNTER — LAB VISIT (OUTPATIENT)
Dept: LAB | Facility: HOSPITAL | Age: 53
End: 2023-03-30
Attending: NURSE PRACTITIONER
Payer: MEDICAID

## 2023-03-30 DIAGNOSIS — I10 HYPERTENSION, UNSPECIFIED TYPE: ICD-10-CM

## 2023-03-30 DIAGNOSIS — I50.9 CONGESTIVE HEART FAILURE, UNSPECIFIED HF CHRONICITY, UNSPECIFIED HEART FAILURE TYPE: Primary | ICD-10-CM

## 2023-03-30 LAB
ANION GAP SERPL CALC-SCNC: 8 MEQ/L (ref 2–13)
BUN SERPL-MCNC: 30 MG/DL (ref 7–20)
CALCIUM SERPL-MCNC: 9.8 MG/DL (ref 8.4–10.2)
CHLORIDE SERPL-SCNC: 102 MMOL/L (ref 98–110)
CO2 SERPL-SCNC: 27 MMOL/L (ref 21–32)
CREAT SERPL-MCNC: 1.46 MG/DL (ref 0.66–1.25)
CREAT/UREA NIT SERPL: 21 (ref 12–20)
GFR SERPLBLD CREATININE-BSD FMLA CKD-EPI: 57 MLS/MIN/1.73/M2
GLUCOSE SERPL-MCNC: 125 MG/DL (ref 70–115)
POTASSIUM SERPL-SCNC: 4.2 MMOL/L (ref 3.5–5.1)
SODIUM SERPL-SCNC: 137 MMOL/L (ref 135–145)

## 2023-03-30 PROCEDURE — 36415 COLL VENOUS BLD VENIPUNCTURE: CPT

## 2023-03-30 PROCEDURE — 80048 BASIC METABOLIC PNL TOTAL CA: CPT

## 2023-04-18 ENCOUNTER — LAB VISIT (OUTPATIENT)
Dept: LAB | Facility: HOSPITAL | Age: 53
End: 2023-04-18
Attending: INTERNAL MEDICINE
Payer: MEDICAID

## 2023-04-18 DIAGNOSIS — I10 ESSENTIAL HYPERTENSION, MALIGNANT: ICD-10-CM

## 2023-04-18 DIAGNOSIS — I50.41 ACUTE COMBINED SYSTOLIC AND DIASTOLIC HEART FAILURE: Primary | ICD-10-CM

## 2023-04-18 LAB
ANION GAP SERPL CALC-SCNC: 6 MEQ/L (ref 2–13)
BASOPHILS # BLD AUTO: 0.04 X10(3)/MCL (ref 0.01–0.08)
BASOPHILS NFR BLD AUTO: 0.5 % (ref 0.1–1.2)
BUN SERPL-MCNC: 17 MG/DL (ref 7–20)
CALCIUM SERPL-MCNC: 9.4 MG/DL (ref 8.4–10.2)
CHLORIDE SERPL-SCNC: 106 MMOL/L (ref 98–110)
CO2 SERPL-SCNC: 26 MMOL/L (ref 21–32)
CREAT SERPL-MCNC: 1.28 MG/DL (ref 0.66–1.25)
CREAT/UREA NIT SERPL: 13 (ref 12–20)
EOSINOPHIL # BLD AUTO: 0.21 X10(3)/MCL (ref 0.04–0.54)
EOSINOPHIL NFR BLD AUTO: 2.9 % (ref 0.7–7)
ERYTHROCYTE [DISTWIDTH] IN BLOOD BY AUTOMATED COUNT: 14.8 % (ref 11.6–14.4)
GFR SERPLBLD CREATININE-BSD FMLA CKD-EPI: 67 MLS/MIN/1.73/M2
GLUCOSE SERPL-MCNC: 150 MG/DL (ref 70–115)
HCT VFR BLD AUTO: 37.4 % (ref 36–52)
HGB BLD-MCNC: 12.2 G/DL (ref 13–18)
IMM GRANULOCYTES # BLD AUTO: 0.03 X10(3)/MCL (ref 0–0.03)
IMM GRANULOCYTES NFR BLD AUTO: 0.4 % (ref 0–0.5)
LYMPHOCYTES # BLD AUTO: 0.83 X10(3)/MCL (ref 1.32–3.57)
LYMPHOCYTES NFR BLD AUTO: 11.4 % (ref 20–55)
MCH RBC QN AUTO: 30 PG (ref 27–34)
MCV RBC AUTO: 91.9 FL (ref 79–99)
MEAN CELL HEMOGLOBIN CONCENTRATION (OHS) G/DL: 32.6 G/DL (ref 31–37)
MONOCYTES # BLD AUTO: 0.64 X10(3)/MCL (ref 0.3–0.82)
MONOCYTES NFR BLD AUTO: 8.8 % (ref 4.7–12.5)
NEUTROPHILS # BLD AUTO: 5.55 X10(3)/MCL (ref 1.78–5.38)
NEUTROPHILS NFR BLD AUTO: 76 % (ref 37–73)
NRBC BLD AUTO-RTO: 0 % (ref 0–1)
PLATELET # BLD AUTO: 277 X10(3)/MCL (ref 140–371)
PMV BLD AUTO: 10.6 FL (ref 9.4–12.4)
POTASSIUM SERPL-SCNC: 4.4 MMOL/L (ref 3.5–5.1)
RBC # BLD AUTO: 4.07 X10(6)/MCL (ref 4–6)
SODIUM SERPL-SCNC: 138 MMOL/L (ref 135–145)
WBC # SPEC AUTO: 7.3 X10(3)/MCL (ref 4–11.5)

## 2023-04-18 PROCEDURE — 85025 COMPLETE CBC W/AUTO DIFF WBC: CPT

## 2023-04-18 PROCEDURE — 80048 BASIC METABOLIC PNL TOTAL CA: CPT

## 2023-04-18 PROCEDURE — 36415 COLL VENOUS BLD VENIPUNCTURE: CPT

## 2023-05-02 RX ORDER — SACUBITRIL AND VALSARTAN 24; 26 MG/1; MG/1
1 TABLET, FILM COATED ORAL 2 TIMES DAILY
COMMUNITY
Start: 2023-04-13

## 2023-05-02 RX ORDER — CHLORTHALIDONE 25 MG/1
25 TABLET ORAL DAILY
COMMUNITY
Start: 2023-04-13 | End: 2024-03-15

## 2023-05-02 RX ORDER — EMPAGLIFLOZIN, METFORMIN HYDROCHLORIDE 10; 1000 MG/1; MG/1
1 TABLET, EXTENDED RELEASE ORAL DAILY
COMMUNITY
Start: 2023-04-13 | End: 2024-03-15

## 2023-05-04 ENCOUNTER — HOSPITAL ENCOUNTER (OUTPATIENT)
Facility: HOSPITAL | Age: 53
Discharge: HOME | End: 2023-05-04
Attending: INTERNAL MEDICINE | Admitting: INTERNAL MEDICINE
Payer: MEDICAID

## 2023-05-04 VITALS
OXYGEN SATURATION: 99 % | DIASTOLIC BLOOD PRESSURE: 82 MMHG | SYSTOLIC BLOOD PRESSURE: 137 MMHG | RESPIRATION RATE: 16 BRPM | HEART RATE: 79 BPM | TEMPERATURE: 98 F

## 2023-05-04 DIAGNOSIS — I25.10 CAD (CORONARY ARTERY DISEASE): ICD-10-CM

## 2023-05-04 DIAGNOSIS — R94.39 ABNORMAL STRESS ECG WITH TREADMILL: ICD-10-CM

## 2023-05-04 LAB — POCT GLUCOSE: 113 MG/DL (ref 70–110)

## 2023-05-04 PROCEDURE — C1769 GUIDE WIRE: HCPCS | Performed by: INTERNAL MEDICINE

## 2023-05-04 PROCEDURE — 63600175 PHARM REV CODE 636 W HCPCS: Performed by: INTERNAL MEDICINE

## 2023-05-04 PROCEDURE — 93799 UNLISTED CV SVC/PROCEDURE: CPT | Performed by: INTERNAL MEDICINE

## 2023-05-04 PROCEDURE — C1753 CATH, INTRAVAS ULTRASOUND: HCPCS | Performed by: INTERNAL MEDICINE

## 2023-05-04 PROCEDURE — C9600 PERC DRUG-EL COR STENT SING: HCPCS | Mod: LD | Performed by: INTERNAL MEDICINE

## 2023-05-04 PROCEDURE — C1760 CLOSURE DEV, VASC: HCPCS | Performed by: INTERNAL MEDICINE

## 2023-05-04 PROCEDURE — 25000003 PHARM REV CODE 250

## 2023-05-04 PROCEDURE — 99152 MOD SED SAME PHYS/QHP 5/>YRS: CPT | Performed by: INTERNAL MEDICINE

## 2023-05-04 PROCEDURE — C1887 CATHETER, GUIDING: HCPCS | Performed by: INTERNAL MEDICINE

## 2023-05-04 PROCEDURE — 93010 EKG 12-LEAD: ICD-10-PCS | Mod: ,,, | Performed by: INTERNAL MEDICINE

## 2023-05-04 PROCEDURE — 25000003 PHARM REV CODE 250: Performed by: INTERNAL MEDICINE

## 2023-05-04 PROCEDURE — C1874 STENT, COATED/COV W/DEL SYS: HCPCS | Performed by: INTERNAL MEDICINE

## 2023-05-04 PROCEDURE — C1894 INTRO/SHEATH, NON-LASER: HCPCS | Performed by: INTERNAL MEDICINE

## 2023-05-04 PROCEDURE — 93010 ELECTROCARDIOGRAM REPORT: CPT | Mod: ,,, | Performed by: INTERNAL MEDICINE

## 2023-05-04 PROCEDURE — 93005 ELECTROCARDIOGRAM TRACING: CPT | Mod: 59

## 2023-05-04 PROCEDURE — C1725 CATH, TRANSLUMIN NON-LASER: HCPCS | Performed by: INTERNAL MEDICINE

## 2023-05-04 PROCEDURE — 85347 COAGULATION TIME ACTIVATED: CPT | Performed by: INTERNAL MEDICINE

## 2023-05-04 PROCEDURE — 93458 L HRT ARTERY/VENTRICLE ANGIO: CPT | Mod: 59 | Performed by: INTERNAL MEDICINE

## 2023-05-04 PROCEDURE — 92978 ENDOLUMINL IVUS OCT C 1ST: CPT | Mod: LD | Performed by: INTERNAL MEDICINE

## 2023-05-04 PROCEDURE — 99153 MOD SED SAME PHYS/QHP EA: CPT | Performed by: INTERNAL MEDICINE

## 2023-05-04 PROCEDURE — 27201423 OPTIME MED/SURG SUP & DEVICES STERILE SUPPLY: Performed by: INTERNAL MEDICINE

## 2023-05-04 DEVICE — EVEROLIMUS-ELUTING PLATINUM CHROMIUM CORONARY STENT SYSTEM
Type: IMPLANTABLE DEVICE | Site: HEART | Status: FUNCTIONAL
Brand: SYNERGY™ XD

## 2023-05-04 DEVICE — ANGIO-SEAL VIP VASCULAR CLOSURE DEVICE
Type: IMPLANTABLE DEVICE | Site: GROIN | Status: FUNCTIONAL
Brand: ANGIO-SEAL

## 2023-05-04 RX ORDER — ASPIRIN 325 MG
325 TABLET ORAL ONCE
Status: COMPLETED | OUTPATIENT
Start: 2023-05-04 | End: 2023-05-04

## 2023-05-04 RX ORDER — FENTANYL CITRATE 50 UG/ML
INJECTION, SOLUTION INTRAMUSCULAR; INTRAVENOUS
Status: DISCONTINUED | OUTPATIENT
Start: 2023-05-04 | End: 2023-05-04 | Stop reason: HOSPADM

## 2023-05-04 RX ORDER — HEPARIN SODIUM 1000 [USP'U]/ML
INJECTION, SOLUTION INTRAVENOUS; SUBCUTANEOUS
Status: DISCONTINUED | OUTPATIENT
Start: 2023-05-04 | End: 2023-05-04 | Stop reason: HOSPADM

## 2023-05-04 RX ORDER — SODIUM CHLORIDE 9 MG/ML
INJECTION, SOLUTION INTRAVENOUS CONTINUOUS
Status: DISCONTINUED | OUTPATIENT
Start: 2023-05-04 | End: 2023-05-04 | Stop reason: HOSPADM

## 2023-05-04 RX ORDER — LIDOCAINE HYDROCHLORIDE AND EPINEPHRINE 20; 10 MG/ML; UG/ML
INJECTION, SOLUTION INFILTRATION; PERINEURAL
Status: DISCONTINUED | OUTPATIENT
Start: 2023-05-04 | End: 2023-05-04 | Stop reason: HOSPADM

## 2023-05-04 RX ORDER — DIAZEPAM 5 MG/1
10 TABLET ORAL
Status: DISCONTINUED | OUTPATIENT
Start: 2023-05-04 | End: 2023-05-04 | Stop reason: HOSPADM

## 2023-05-04 RX ORDER — MIDAZOLAM HYDROCHLORIDE 1 MG/ML
INJECTION, SOLUTION INTRAMUSCULAR; INTRAVENOUS
Status: DISCONTINUED | OUTPATIENT
Start: 2023-05-04 | End: 2023-05-04 | Stop reason: HOSPADM

## 2023-05-04 RX ORDER — SODIUM CHLORIDE 9 MG/ML
INJECTION, SOLUTION INTRAVENOUS CONTINUOUS
Status: ACTIVE | OUTPATIENT
Start: 2023-05-04 | End: 2023-05-04

## 2023-05-04 RX ORDER — DIPHENHYDRAMINE HCL 25 MG
50 CAPSULE ORAL
Status: DISCONTINUED | OUTPATIENT
Start: 2023-05-04 | End: 2023-05-04 | Stop reason: HOSPADM

## 2023-05-04 RX ORDER — CLOPIDOGREL 300 MG/1
TABLET, FILM COATED ORAL
Status: DISCONTINUED | OUTPATIENT
Start: 2023-05-04 | End: 2023-05-04 | Stop reason: HOSPADM

## 2023-05-04 RX ORDER — SODIUM CHLORIDE 9 MG/ML
INJECTION, SOLUTION INTRAVENOUS
Status: DISCONTINUED | OUTPATIENT
Start: 2023-05-04 | End: 2023-05-04 | Stop reason: HOSPADM

## 2023-05-04 RX ORDER — NITROGLYCERIN 20 MG/100ML
INJECTION INTRAVENOUS
Status: DISCONTINUED | OUTPATIENT
Start: 2023-05-04 | End: 2023-05-04 | Stop reason: HOSPADM

## 2023-05-04 RX ORDER — ACETAMINOPHEN 500 MG
1000 TABLET ORAL ONCE
Status: COMPLETED | OUTPATIENT
Start: 2023-05-04 | End: 2023-05-04

## 2023-05-04 RX ORDER — KETOROLAC TROMETHAMINE 5 MG/ML
1 SOLUTION OPHTHALMIC 2 TIMES DAILY PRN
COMMUNITY
Start: 2023-05-02

## 2023-05-04 RX ADMIN — DIPHENHYDRAMINE HYDROCHLORIDE 50 MG: 25 CAPSULE ORAL at 08:05

## 2023-05-04 RX ADMIN — ASPIRIN 325 MG ORAL TABLET 325 MG: 325 PILL ORAL at 08:05

## 2023-05-04 RX ADMIN — ACETAMINOPHEN 1000 MG: 500 TABLET, FILM COATED ORAL at 04:05

## 2023-05-04 RX ADMIN — SODIUM CHLORIDE: 9 INJECTION, SOLUTION INTRAVENOUS at 08:05

## 2023-05-04 RX ADMIN — DIAZEPAM 10 MG: 5 TABLET ORAL at 08:05

## 2023-05-04 NOTE — Clinical Note
The catheter was inserted into the and was inserted over the wire into the mid   left anterior descending.

## 2023-05-04 NOTE — DISCHARGE INSTRUCTIONS
Resume Synjardy in 48 hours.    WRIST ACCESS: No lifting over 5 lbs for 3 days with that arm/hand, wait 24 hrs before driving, avoid flexing at the wrist such as hammering, playing tennis, or swinging objects.    GROIN ACCESS: No driving for 3 days, no heavy lifting (over 10 lbs) for 3 days, wait 3 days before sexual intercourse.    Take it easy for the rest of the day. Keep arm or leg straight as much as possible. If catheter was put in your groin, avoid using stairs for a few days. When you must use stairs, step up with the leg that was not used for the angiogram.     Keep the catheter wound area clean and dry for 24 hours after your angiogram. You may shower 24 hours after your angiogram. Refrain from taking a tub bath, swimming, hot tubs, and submerging in dish water for 5 days. During shower, gently wash your angiogram site with soap and water. If the site is oozing or bleeding slightly, place a small bandage over it to protect your clothes.     If the place where the catheter was inserted starts to bleed, use your hand to put pressure on the bandage. It is better if someone else hold pressure for you. Also, call for help. Hold pressure for 30 minutes, even after bleeding stops. Lie flat for at least an hour. If bleeding does not stop within 15 minutes of holding pressure, you will need to go to the nearest hospital. Do not walk or drive yourself.     Drink plenty of liquids to help your body rid of the dye that was used during the angiogram. Drink eight (8 oz) glasses of water each day. Limit the amount of caffeine you drink such as coffee, tea, and soda. Do not drink alcohol for 24 hours after the test. Taking these actions are critical for the health of your kidneys.     REASONS TO CALL YOUR DOCTOR:    You have shaking, chills, or a body temperature over 101.5 F  The puncture site becomes red or has pus or foul-smelling drainage coming from it.  You have increasing pain at the puncture site. (It is normal to  have soreness, but this should get better, not worse).  You have questions or concerns about your angiogram, medicines, or health condition.     SEEK CARE IMMEDIATELY IF:  The leg or arm used for the angiogram becomes numb, is very painful, or changes color.  The bruise site starts to get bigger, or the area has new swelling.     IT IS AN EMERGENCY:  The puncture site is bleeding and you cannot stop the bleeding.  You have signs of a stroke..new weakness, trouble moving one side of your face or body, new trouble thinking or speaking, and new changes in vision.

## 2023-05-04 NOTE — Clinical Note
The catheter was inserted into the and was inserted over the wire into the ostium   left main. An angiography was performed of the left coronary arteries. Multiple views were taken. The angiography was performed via power injection. The injected amount was 10 mL contrast at 4 mL/s.

## 2023-05-04 NOTE — Clinical Note
The catheter was inserted into the and was inserted over the wire into the ostium   right coronary artery. An angiography was performed of the right coronary arteries. Multiple views were taken. The angiography was performed via power injection. The injected amount was 10 mL contrast at 4 mL/s.

## 2023-05-04 NOTE — Clinical Note
The catheter was inserted into the and was inserted over the wire into the mid   left anterior descending. Recording at 1049.

## 2023-05-04 NOTE — Clinical Note
The left DP pulse was 2+. The right DP pulse was 1+.  The left PT pulse was 1+. The right PT pulse was 1+. The left radial pulse was allens test positive.

## 2023-05-04 NOTE — DISCHARGE SUMMARY
[facility]-Cath Lab/EP  Discharge Note  Short Stay    Procedure(s) (LRB):  Angiogram, Coronary, with Left Heart Cath (Right)  IVUS, Coronary  Instantaneous Wave-Free Ratio (IFR) (N/A)  Stent, Drug Eluting, Single Vessel, Coronary  Ventriculogram, Left      OUTCOME: Patient tolerated treatment/procedure well without complication and is now ready for discharge.    DISPOSITION: Home or Self Care    FINAL DIAGNOSIS:  CAD s/p PCI    FOLLOWUP: In clinic    DISCHARGE INSTRUCTIONS:  right radial    TIME SPENT ON DISCHARGE: 35 minutes

## 2023-08-02 ENCOUNTER — LAB VISIT (OUTPATIENT)
Dept: LAB | Facility: HOSPITAL | Age: 53
End: 2023-08-02
Attending: NURSE PRACTITIONER
Payer: MEDICAID

## 2023-08-02 DIAGNOSIS — I10 ESSENTIAL HYPERTENSION, MALIGNANT: ICD-10-CM

## 2023-08-02 DIAGNOSIS — I50.41 ACUTE COMBINED SYSTOLIC AND DIASTOLIC HEART FAILURE: Primary | ICD-10-CM

## 2023-08-02 DIAGNOSIS — I10 HYPERTENSION, UNSPECIFIED TYPE: ICD-10-CM

## 2023-08-02 DIAGNOSIS — I50.9 CONGESTIVE HEART FAILURE, UNSPECIFIED HF CHRONICITY, UNSPECIFIED HEART FAILURE TYPE: ICD-10-CM

## 2023-08-02 LAB
CHOLEST SERPL-MCNC: 110 MG/DL (ref 0–200)
HDLC SERPL-MCNC: 36 MG/DL (ref 40–60)
LDLC SERPL DIRECT ASSAY-SCNC: 48.9 MG/DL (ref 30–100)
TRIGL SERPL-MCNC: 115 MG/DL (ref 30–200)

## 2023-08-02 PROCEDURE — 36415 COLL VENOUS BLD VENIPUNCTURE: CPT

## 2023-08-02 PROCEDURE — 80061 LIPID PANEL: CPT

## 2023-12-05 ENCOUNTER — HOSPITAL ENCOUNTER (EMERGENCY)
Facility: HOSPITAL | Age: 53
Discharge: HOME | End: 2023-12-05
Attending: INTERNAL MEDICINE
Payer: MEDICAID

## 2023-12-05 VITALS
TEMPERATURE: 98 F | OXYGEN SATURATION: 100 % | SYSTOLIC BLOOD PRESSURE: 158 MMHG | RESPIRATION RATE: 20 BRPM | BODY MASS INDEX: 28.65 KG/M2 | WEIGHT: 194 LBS | DIASTOLIC BLOOD PRESSURE: 91 MMHG | HEART RATE: 73 BPM

## 2023-12-05 DIAGNOSIS — N20.0 BILATERAL NEPHROLITHIASIS: Primary | ICD-10-CM

## 2023-12-05 DIAGNOSIS — N20.1 URETEROLITHIASIS: ICD-10-CM

## 2023-12-05 LAB
ANION GAP SERPL CALC-SCNC: 11 MEQ/L
APPEARANCE UR: ABNORMAL
BACTERIA #/AREA URNS AUTO: ABNORMAL /HPF
BASOPHILS # BLD AUTO: 0.05 X10(3)/MCL
BASOPHILS NFR BLD AUTO: 0.4 %
BILIRUB UR QL STRIP.AUTO: NEGATIVE
BUN SERPL-MCNC: 26 MG/DL (ref 8.4–25.7)
CALCIUM SERPL-MCNC: 9.5 MG/DL (ref 8.4–10.2)
CHLORIDE SERPL-SCNC: 104 MMOL/L (ref 98–107)
CO2 SERPL-SCNC: 25 MMOL/L (ref 22–29)
COLOR UR AUTO: YELLOW
CREAT SERPL-MCNC: 1.5 MG/DL (ref 0.73–1.18)
CREAT/UREA NIT SERPL: 17
EOSINOPHIL # BLD AUTO: 0.25 X10(3)/MCL (ref 0–0.9)
EOSINOPHIL NFR BLD AUTO: 1.9 %
ERYTHROCYTE [DISTWIDTH] IN BLOOD BY AUTOMATED COUNT: 12.8 % (ref 11.5–17)
GFR SERPLBLD CREATININE-BSD FMLA CKD-EPI: 55 MLS/MIN/1.73/M2
GLUCOSE SERPL-MCNC: 118 MG/DL (ref 74–100)
GLUCOSE UR QL STRIP.AUTO: ABNORMAL
HCT VFR BLD AUTO: 42.3 % (ref 42–52)
HGB BLD-MCNC: 13.9 G/DL (ref 14–18)
IMM GRANULOCYTES # BLD AUTO: 0.05 X10(3)/MCL (ref 0–0.04)
IMM GRANULOCYTES NFR BLD AUTO: 0.4 %
KETONES UR QL STRIP.AUTO: NEGATIVE
LEUKOCYTE ESTERASE UR QL STRIP.AUTO: ABNORMAL
LYMPHOCYTES # BLD AUTO: 1.3 X10(3)/MCL (ref 0.6–4.6)
LYMPHOCYTES NFR BLD AUTO: 9.8 %
MCH RBC QN AUTO: 30.9 PG (ref 27–31)
MCHC RBC AUTO-ENTMCNC: 32.9 G/DL (ref 33–36)
MCV RBC AUTO: 94 FL (ref 80–94)
MONOCYTES # BLD AUTO: 0.81 X10(3)/MCL (ref 0.1–1.3)
MONOCYTES NFR BLD AUTO: 6.1 %
MUCOUS THREADS URNS QL MICRO: ABNORMAL /LPF
NEUTROPHILS # BLD AUTO: 10.74 X10(3)/MCL (ref 2.1–9.2)
NEUTROPHILS NFR BLD AUTO: 81.4 %
NITRITE UR QL STRIP.AUTO: POSITIVE
PH UR STRIP.AUTO: 5 [PH]
PLATELET # BLD AUTO: 209 X10(3)/MCL (ref 130–400)
PMV BLD AUTO: 10.4 FL (ref 7.4–10.4)
POTASSIUM SERPL-SCNC: 4.4 MMOL/L (ref 3.5–5.1)
PROT UR QL STRIP.AUTO: ABNORMAL
RBC # BLD AUTO: 4.5 X10(6)/MCL (ref 4.7–6.1)
RBC #/AREA URNS AUTO: ABNORMAL /HPF
RBC UR QL AUTO: ABNORMAL
SODIUM SERPL-SCNC: 140 MMOL/L (ref 136–145)
SP GR UR STRIP.AUTO: 1.02 (ref 1–1.03)
SQUAMOUS #/AREA URNS AUTO: ABNORMAL /HPF
UROBILINOGEN UR STRIP-ACNC: 0.2
WBC # SPEC AUTO: 13.2 X10(3)/MCL (ref 4.5–11.5)
WBC #/AREA URNS AUTO: ABNORMAL /HPF

## 2023-12-05 PROCEDURE — 63600175 PHARM REV CODE 636 W HCPCS: Performed by: INTERNAL MEDICINE

## 2023-12-05 PROCEDURE — 96374 THER/PROPH/DIAG INJ IV PUSH: CPT

## 2023-12-05 PROCEDURE — 87086 URINE CULTURE/COLONY COUNT: CPT | Performed by: INTERNAL MEDICINE

## 2023-12-05 PROCEDURE — 25000003 PHARM REV CODE 250: Performed by: INTERNAL MEDICINE

## 2023-12-05 PROCEDURE — 96375 TX/PRO/DX INJ NEW DRUG ADDON: CPT

## 2023-12-05 PROCEDURE — 99285 EMERGENCY DEPT VISIT HI MDM: CPT | Mod: 25

## 2023-12-05 PROCEDURE — 87184 SC STD DISK METHOD PER PLATE: CPT | Performed by: INTERNAL MEDICINE

## 2023-12-05 PROCEDURE — 81001 URINALYSIS AUTO W/SCOPE: CPT | Performed by: INTERNAL MEDICINE

## 2023-12-05 PROCEDURE — 80048 BASIC METABOLIC PNL TOTAL CA: CPT | Performed by: INTERNAL MEDICINE

## 2023-12-05 PROCEDURE — 96361 HYDRATE IV INFUSION ADD-ON: CPT

## 2023-12-05 PROCEDURE — 85025 COMPLETE CBC W/AUTO DIFF WBC: CPT | Performed by: INTERNAL MEDICINE

## 2023-12-05 RX ORDER — TAMSULOSIN HYDROCHLORIDE 0.4 MG/1
0.4 CAPSULE ORAL DAILY
Qty: 10 CAPSULE | Refills: 0 | Status: SHIPPED | OUTPATIENT
Start: 2023-12-05 | End: 2024-03-15

## 2023-12-05 RX ORDER — EMPAGLIFLOZIN 10 MG/1
10 TABLET, FILM COATED ORAL EVERY MORNING
COMMUNITY
Start: 2023-11-14

## 2023-12-05 RX ORDER — ONDANSETRON 4 MG/1
4 TABLET, ORALLY DISINTEGRATING ORAL EVERY 8 HOURS PRN
Qty: 15 TABLET | Refills: 0 | Status: SHIPPED | OUTPATIENT
Start: 2023-12-05 | End: 2023-12-10

## 2023-12-05 RX ORDER — KETOROLAC TROMETHAMINE 10 MG/1
10 TABLET, FILM COATED ORAL EVERY 6 HOURS PRN
Qty: 15 TABLET | Refills: 0 | Status: SHIPPED | OUTPATIENT
Start: 2023-12-05 | End: 2023-12-10

## 2023-12-05 RX ORDER — CEFDINIR 300 MG/1
300 CAPSULE ORAL 2 TIMES DAILY
Qty: 20 CAPSULE | Refills: 0 | Status: SHIPPED | OUTPATIENT
Start: 2023-12-05 | End: 2023-12-15

## 2023-12-05 RX ORDER — KETOROLAC TROMETHAMINE 30 MG/ML
15 INJECTION, SOLUTION INTRAMUSCULAR; INTRAVENOUS
Status: COMPLETED | OUTPATIENT
Start: 2023-12-05 | End: 2023-12-05

## 2023-12-05 RX ORDER — ONDANSETRON 2 MG/ML
4 INJECTION INTRAMUSCULAR; INTRAVENOUS
Status: COMPLETED | OUTPATIENT
Start: 2023-12-05 | End: 2023-12-05

## 2023-12-05 RX ORDER — HYDROCODONE BITARTRATE AND ACETAMINOPHEN 5; 325 MG/1; MG/1
1 TABLET ORAL EVERY 6 HOURS PRN
Qty: 12 TABLET | Refills: 0 | Status: SHIPPED | OUTPATIENT
Start: 2023-12-05 | End: 2024-03-15

## 2023-12-05 RX ORDER — SODIUM CHLORIDE 9 MG/ML
1000 INJECTION, SOLUTION INTRAVENOUS
Status: COMPLETED | OUTPATIENT
Start: 2023-12-05 | End: 2023-12-05

## 2023-12-05 RX ADMIN — ONDANSETRON 4 MG: 2 INJECTION INTRAMUSCULAR; INTRAVENOUS at 04:12

## 2023-12-05 RX ADMIN — KETOROLAC TROMETHAMINE 15 MG: 30 INJECTION, SOLUTION INTRAMUSCULAR; INTRAVENOUS at 04:12

## 2023-12-05 RX ADMIN — SODIUM CHLORIDE 1000 ML: 9 INJECTION, SOLUTION INTRAVENOUS at 04:12

## 2023-12-05 NOTE — ED PROVIDER NOTES
Encounter Date: 12/5/2023  History from patient     History     Chief Complaint   Patient presents with    Flank Pain     Pt c/o pain to left flank and left lower back since noon.     ZI Josue is 53 y.o. male who  has a past medical history of CHF (congestive heart failure), ORELLANA (dyspnea on exertion), Hypertension, Left ventricular systolic dysfunction (LVSD), NSTEMI (non-ST elevated myocardial infarction), Orthopnea, and Stroke. arrives in ER with c/o Flank Pain (Pt c/o pain to left flank and left lower back since noon.)      Review of patient's allergies indicates:  No Known Allergies  Past Medical History:   Diagnosis Date    CHF (congestive heart failure)     ORELLANA (dyspnea on exertion)     Hypertension     Left ventricular systolic dysfunction (LVSD)     NSTEMI (non-ST elevated myocardial infarction)     Orthopnea     Stroke      Past Surgical History:   Procedure Laterality Date    ANGIOGRAM, CORONARY, WITH LEFT HEART CATHETERIZATION Right 5/4/2023    Procedure: Angiogram, Coronary, with Left Heart Cath;  Surgeon: Minesh Jimenez MD;  Location: [facility] CATH LAB;  Service: Cardiology;  Laterality: Right;    INSTANTANEOUS WAVE-FREE RATIO (IFR) N/A 5/4/2023    Procedure: Instantaneous Wave-Free Ratio (IFR);  Surgeon: Minesh Jimenez MD;  Location: [facility] CATH LAB;  Service: Cardiology;  Laterality: N/A;    IVUS, CORONARY  5/4/2023    Procedure: IVUS, Coronary;  Surgeon: Minesh Jimenez MD;  Location: [facility] CATH LAB;  Service: Cardiology;;    STENT, DRUG ELUTING, SINGLE VESSEL, CORONARY  5/4/2023    Procedure: Stent, Drug Eluting, Single Vessel, Coronary;  Surgeon: Minesh Jimenez MD;  Location: [facility] CATH LAB;  Service: Cardiology;;    VENTRICULOGRAM, LEFT  5/4/2023    Procedure: Ventriculogram, Left;  Surgeon: Minesh Jimenez MD;  Location: [facility] CATH LAB;  Service: Cardiology;;     History reviewed. No pertinent family history.  Social History     Tobacco Use    Smoking status: Never    Smokeless tobacco: Never    Substance Use Topics    Alcohol use: Not Currently    Drug use: Never     Review of Systems   Constitutional:  Negative for fever.   HENT:  Negative for trouble swallowing and voice change.    Eyes:  Negative for visual disturbance.   Respiratory:  Negative for cough and shortness of breath.    Cardiovascular:  Negative for chest pain.   Gastrointestinal:  Negative for abdominal pain, diarrhea, nausea and vomiting.   Genitourinary:  Positive for flank pain. Negative for dysuria and hematuria.   Musculoskeletal:  Negative for gait problem.        No Pain.   Skin:  Negative for color change and rash.   Neurological:  Negative for headaches.   Psychiatric/Behavioral:  Negative for behavioral problems and sleep disturbance.    All other systems reviewed and are negative.      Physical Exam     Initial Vitals [12/05/23 1622]   BP Pulse Resp Temp SpO2   (!) 150/86 75 20 97.6 °F (36.4 °C) 98 %      MAP       --         Physical Exam    Nursing note and vitals reviewed.  Constitutional: He appears well-developed.   Eyes: EOM are normal.   Neck: Neck supple.   Cardiovascular:  Normal rate.           Pulmonary/Chest: Breath sounds normal.   Abdominal: Abdomen is soft. Bowel sounds are normal.   Musculoskeletal:         General: No tenderness or edema.      Cervical back: Neck supple.     Neurological: He is alert and oriented to person, place, and time.   Right hemiplegia   Skin: Skin is warm.         ED Course   Procedures    Orders Placed This Encounter   Procedures    CT Renal Stone Study ABD Pelvis WO    Basic metabolic panel    CBC auto differential    Urinalysis, Reflex to Urine Culture    CBC with Differential    Urinalysis, Microscopic    Insert Saline lock IV     Medications   0.9%  NaCl infusion (1,000 mLs Intravenous New Bag 12/5/23 1651)   ketorolac injection 15 mg (15 mg Intravenous Given 12/5/23 1652)   ondansetron injection 4 mg (4 mg Intravenous Given 12/5/23 1652)     Admission on 12/05/2023   Component  Date Value Ref Range Status    Sodium Level 12/05/2023 140  136 - 145 mmol/L Final    Potassium Level 12/05/2023 4.4  3.5 - 5.1 mmol/L Final    Chloride 12/05/2023 104  98 - 107 mmol/L Final    Carbon Dioxide 12/05/2023 25  22 - 29 mmol/L Final    Glucose Level 12/05/2023 118 (H)  74 - 100 mg/dL Final    Blood Urea Nitrogen 12/05/2023 26.0 (H)  8.4 - 25.7 mg/dL Final    Creatinine 12/05/2023 1.50 (H)  0.73 - 1.18 mg/dL Final    BUN/Creatinine Ratio 12/05/2023 17   Final    Calcium Level Total 12/05/2023 9.5  8.4 - 10.2 mg/dL Final    Anion Gap 12/05/2023 11.0  mEq/L Final    eGFR 12/05/2023 55  mls/min/1.73/m2 Final    Color, UA 12/05/2023 Yellow  Yellow, Light-Yellow, Dark Yellow, Adry, Straw Final    Appearance, UA 12/05/2023 Cloudy (A)  Clear Final    Specific Gravity, UA 12/05/2023 1.020  1.005 - 1.030 Final    pH, UA 12/05/2023 5.0  5.0 - 8.5 Final    Protein, UA 12/05/2023 2+ (A)  Negative Final    Glucose, UA 12/05/2023 3+ (A)  Negative, Normal Final    Ketones, UA 12/05/2023 Negative  Negative Final    Blood, UA 12/05/2023 3+ (A)  Negative Final    Bilirubin, UA 12/05/2023 Negative  Negative Final    Urobilinogen, UA 12/05/2023 0.2  0.2, 1.0, Normal Final    Nitrites, UA 12/05/2023 Positive (A)  Negative Final    Leukocyte Esterase, UA 12/05/2023 1+ (A)  Negative Final    WBC 12/05/2023 13.20 (H)  4.50 - 11.50 x10(3)/mcL Final    RBC 12/05/2023 4.50 (L)  4.70 - 6.10 x10(6)/mcL Final    Hgb 12/05/2023 13.9 (L)  14.0 - 18.0 g/dL Final    Hct 12/05/2023 42.3  42.0 - 52.0 % Final    MCV 12/05/2023 94.0  80.0 - 94.0 fL Final    MCH 12/05/2023 30.9  27.0 - 31.0 pg Final    MCHC 12/05/2023 32.9 (L)  33.0 - 36.0 g/dL Final    RDW 12/05/2023 12.8  11.5 - 17.0 % Final    Platelet 12/05/2023 209  130 - 400 x10(3)/mcL Final    MPV 12/05/2023 10.4  7.4 - 10.4 fL Final    Neut % 12/05/2023 81.4  % Final    Lymph % 12/05/2023 9.8  % Final    Mono % 12/05/2023 6.1  % Final    Eos % 12/05/2023 1.9  % Final    Basophil %  12/05/2023 0.4  % Final    Lymph # 12/05/2023 1.30  0.6 - 4.6 x10(3)/mcL Final    Neut # 12/05/2023 10.74 (H)  2.1 - 9.2 x10(3)/mcL Final    Mono # 12/05/2023 0.81  0.1 - 1.3 x10(3)/mcL Final    Eos # 12/05/2023 0.25  0 - 0.9 x10(3)/mcL Final    Baso # 12/05/2023 0.05  <=0.2 x10(3)/mcL Final    IG# 12/05/2023 0.05 (H)  0 - 0.04 x10(3)/mcL Final    IG% 12/05/2023 0.4  % Final       Labs Reviewed   BASIC METABOLIC PANEL - Abnormal; Notable for the following components:       Result Value    Glucose Level 118 (*)     Blood Urea Nitrogen 26.0 (*)     Creatinine 1.50 (*)     All other components within normal limits   URINALYSIS, REFLEX TO URINE CULTURE - Abnormal; Notable for the following components:    Appearance, UA Cloudy (*)     Protein, UA 2+ (*)     Glucose, UA 3+ (*)     Blood, UA 3+ (*)     Nitrites, UA Positive (*)     Leukocyte Esterase, UA 1+ (*)     All other components within normal limits   CBC WITH DIFFERENTIAL - Abnormal; Notable for the following components:    WBC 13.20 (*)     RBC 4.50 (*)     Hgb 13.9 (*)     MCHC 32.9 (*)     Neut # 10.74 (*)     IG# 0.05 (*)     All other components within normal limits   CBC W/ AUTO DIFFERENTIAL    Narrative:     The following orders were created for panel order CBC auto differential.  Procedure                               Abnormality         Status                     ---------                               -----------         ------                     CBC with Differential[[id number]]        Abnormal            Final result                 Please view results for these tests on the individual orders.   URINALYSIS, MICROSCOPIC          Imaging Results              CT Renal Stone Study ABD Pelvis WO (Final result)  Result time 12/05/23 17:31:20      Final result by Arie Sommer MD (12/05/23 17:31:20)                   Impression:      1. There is an obstructing 4 mm calculus at the left UVJ.  2. There is right hydronephrosis and right hydroureter, most  likely secondary to 2 small calculi which have recently passed the right UVJ.  3. Bilateral nonobstructing nephrolithiasis.      Electronically signed by: Arie Sommer MD  Date:    12/05/2023  Time:    17:31               Narrative:    EXAMINATION:  CT RENAL STONE STUDY ABD PELVIS WO    CLINICAL HISTORY:  Flank pain, kidney stone suspected;    TECHNIQUE:  Helical imaging of the abdomen and pelvis was performed without IV contrast. Axial, sagittal and coronal images were generated.  Automated exposure control was utilized to limit radiation exposure to the patient.    Total DLP for the study:  818 mgycm.    COMPARISON:  There is no pertinent prior study currently available for comparison.    FINDINGS:  Base of Chest:    There is no dense lobar consolidation or pleural effusion.  Cardiac chamber size within normal limits.    Liver, pancreas, spleen, gallbladder:    There is no acute abnormality of the liver, pancreas or spleen.  The gallbladder is within normal limits.  There are no inflammatory changes in the gallbladder fossa.    Kidneys:    There is moderate left hydronephrosis and left hydroureter secondary to a 4 mm calculus at the left UVJ.  Left Gauri ureteral and perinephric fat stranding are noted.  There is right hydronephrosis and right hydroureter without a visible obstructing urinary tract calculus.  There are 2 small calculi in the dependent aspect of the urinary bladder which most likely reflect calculi which have recently passed the right UVJ.  There is right perinephric and Gauri ureteral fat stranding.  There is a 21 x 12 mm calculus in the right renal pelvis.  Multiple smaller bilateral renal caliceal calculi are noted.    Bowel:    The lack of oral contrast limits evaluation of the bowel.  There is no acute abnormality of the stomach.  There is no small bowel dilatation.  There is no acute abnormality of the colon.  The rectum is within normal limits.  The appendix appears  normal.    Other:    There is no free air, free fluid or pathologic lymph node enlargement.  The IVC is within normal limits. There is no aneurysmal dilatation of the abdominal aorta.    Pelvis:    The urinary bladder is within normal limits. There is no significant free fluid in the pelvis.  The prostate is within normal limits.    Body wall:    There is no acute body wall finding.  Chronic changes are seen in the spine.  There is a fat containing right inguinal hernia.                                       Medications   0.9%  NaCl infusion (1,000 mLs Intravenous New Bag 12/5/23 1651)   ketorolac injection 15 mg (15 mg Intravenous Given 12/5/23 1652)   ondansetron injection 4 mg (4 mg Intravenous Given 12/5/23 1652)     Medical Decision Making  Amount and/or Complexity of Data Reviewed  Labs: ordered.  Radiology: ordered.    Risk  Prescription drug management.               ED Course as of 12/05/23 1749   Tue Dec 05, 2023   1748 Patient essentially has a 4 mm stone in the left ureter, he also had hydro ureter on the right side but he does not have any stones in the ureter anymore, he has multiple stones in the kidneys bilaterally, he does not have any fever, no vomiting, his pain is controlled now, I will put him on antibiotic, pain medicines, Flomax and Zofran and let him go home and I provided him with instructions as to come back in case he runs fever or vomiting with pain otherwise he has had these kidney stones a lot of times, he can go and see his urologist for follow-up.Patient verbalized understanding and is willing to go home.  Discharge home. [GQ]      ED Course User Index  [GQ] Sharon Del Real MD                           Clinical Impression:  Final diagnoses:  [N20.0] Bilateral nephrolithiasis (Primary)  [N20.1] Ureterolithiasis - Left          ED Disposition Condition    Discharge Stable          ED Prescriptions       Medication Sig Dispense Start Date End Date Auth. Provider    ketorolac (TORADOL)  10 mg tablet Take 1 tablet (10 mg total) by mouth every 6 (six) hours as needed for Pain. 15 tablet 12/5/2023 12/10/2023 Sharon Del Real MD    cefdinir (OMNICEF) 300 MG capsule Take 1 capsule (300 mg total) by mouth 2 (two) times daily. for 10 days 20 capsule 12/5/2023 12/15/2023 Sharon Del Real MD    tamsulosin (FLOMAX) 0.4 mg Cap Take 1 capsule (0.4 mg total) by mouth once daily. 10 capsule 12/5/2023 12/4/2024 Sharon Del Real MD    HYDROcodone-acetaminophen (NORCO) 5-325 mg per tablet Take 1 tablet by mouth every 6 (six) hours as needed for Pain. 12 tablet 12/5/2023 -- Sharon Del Real MD    ondansetron (ZOFRAN-ODT) 4 MG TbDL Take 1 tablet (4 mg total) by mouth every 8 (eight) hours as needed. 15 tablet 12/5/2023 12/10/2023 Sharon Del Real MD          Follow-up Information       Follow up With Specialties Details Why Contact Info    Wilner Watkins III, MD Family Medicine In 2 days  [address] [id number] [phone]      Urologist                 Sharon Del Real MD  12/05/23 2084

## 2023-12-05 NOTE — DISCHARGE INSTRUCTIONS
Take medicines as prescribed, see a urologist for follow-up, further workup, and treatment as needed.    Return to emergency room in case you develop fever, vomiting, with pain together    See a family doctor to refer to a urologist    Gurpreet Pedraza MD  [address] [facility] [place]  [address] [phone]    Alban Trujillo MD.  [address] [facility] [place]  [address] [phone]    [facility]  [address]  [place] [address] [phone]        Take medicines as prescribed    See your family doctor in one to 2 days for further evaluation, workup, and treatment as necessary    Avoid driving or operating machinery while taking medicines as some medicines might cause drowsiness and may cause problems. Also pain medicines have potential of being addictive  so use Pain meds specially Narcotics Sparingly.    The exam and treatment you received in Emergency Room was for an urgent problem and NOT INTENDED AS COMPLETE CARE. It is important that you FOLLOW UP with a doctor for ongoing care. If your symptoms become WORSE or you DO NOT IMPROVE and you are unable to reach your health care provider, you should RETURN to the emergency department. The Emergency Room doctor has provided a PRELIMINARY INTERPRETATION of all your STUDIES. A final interpretation may be done after you are discharged. IF A CHANGE in your diagnosis or treatment is needed WE WILL CONTACT YOU. It is critical that we have a CURRENT PHONE NUMBER FOR YOU.

## 2023-12-09 LAB
BACTERIA UR CULT: ABNORMAL
BACTERIA UR CULT: ABNORMAL

## 2023-12-13 DIAGNOSIS — N20.0 RENAL CALCULI: Primary | ICD-10-CM

## 2023-12-20 ENCOUNTER — OFFICE VISIT (OUTPATIENT)
Dept: UROLOGY | Facility: CLINIC | Age: 53
End: 2023-12-20
Payer: MEDICAID

## 2023-12-20 DIAGNOSIS — N20.0 RENAL CALCULI: ICD-10-CM

## 2023-12-20 PROCEDURE — 3052F HG A1C>EQUAL 8.0%<EQUAL 9.0%: CPT | Mod: CPTII,S$GLB,, | Performed by: NURSE PRACTITIONER

## 2023-12-20 PROCEDURE — 1160F RVW MEDS BY RX/DR IN RCRD: CPT | Mod: CPTII,S$GLB,, | Performed by: NURSE PRACTITIONER

## 2023-12-20 PROCEDURE — 4010F PR ACE/ARB THEARPY RXD/TAKEN: ICD-10-PCS | Mod: CPTII,S$GLB,, | Performed by: NURSE PRACTITIONER

## 2023-12-20 PROCEDURE — 99203 PR OFFICE/OUTPT VISIT, NEW, LEVL III, 30-44 MIN: ICD-10-PCS | Mod: S$GLB,,, | Performed by: NURSE PRACTITIONER

## 2023-12-20 PROCEDURE — 1160F PR REVIEW ALL MEDS BY PRESCRIBER/CLIN PHARMACIST DOCUMENTED: ICD-10-PCS | Mod: CPTII,S$GLB,, | Performed by: NURSE PRACTITIONER

## 2023-12-20 PROCEDURE — 99203 OFFICE O/P NEW LOW 30 MIN: CPT | Mod: S$GLB,,, | Performed by: NURSE PRACTITIONER

## 2023-12-20 PROCEDURE — 1159F PR MEDICATION LIST DOCUMENTED IN MEDICAL RECORD: ICD-10-PCS | Mod: CPTII,S$GLB,, | Performed by: NURSE PRACTITIONER

## 2023-12-20 PROCEDURE — 1159F MED LIST DOCD IN RCRD: CPT | Mod: CPTII,S$GLB,, | Performed by: NURSE PRACTITIONER

## 2023-12-20 PROCEDURE — 4010F ACE/ARB THERAPY RXD/TAKEN: CPT | Mod: CPTII,S$GLB,, | Performed by: NURSE PRACTITIONER

## 2023-12-20 PROCEDURE — 3052F PR MOST RECENT HEMOGLOBIN A1C LEVEL 8.0 - < 9.0%: ICD-10-PCS | Mod: CPTII,S$GLB,, | Performed by: NURSE PRACTITIONER

## 2023-12-20 RX ORDER — DICLOFENAC SODIUM 10 MG/G
2 GEL TOPICAL 2 TIMES DAILY
COMMUNITY
Start: 2023-12-11

## 2023-12-20 RX ORDER — TERBINAFINE HYDROCHLORIDE 250 MG/1
TABLET ORAL
COMMUNITY
Start: 2023-12-11 | End: 2024-03-15

## 2023-12-20 RX ORDER — NYSTATIN 100000 U/G
CREAM TOPICAL 2 TIMES DAILY
COMMUNITY
Start: 2023-09-07 | End: 2024-03-15

## 2023-12-20 RX ORDER — TRIAMCINOLONE ACETONIDE 40 MG/ML
INJECTION, SUSPENSION INTRA-ARTICULAR; INTRAMUSCULAR
COMMUNITY
Start: 2023-12-11

## 2023-12-20 NOTE — PROGRESS NOTES
Subjective:       Patient ID: Manpreet Josue is a 53 y.o. male.    Chief Complaint: Nephrolithiasis      HPI: 53-year-old male wheelchair-bound stroke victim presents today with his wife follow-up bilateral kidney stone.  Patient recently underwent a CT scan identifying a right UVJ stone that appearance a recently passed in the bladder with some residual mild right hydroureter hydronephrosis.  Also has a large 2 x 1 cm right renal pelvis stone.  Recently passed a left UVJ stone seen the debris in the toilet.  He has had no pain since 5th of December 2023 no other urologic concerns.         Past Medical History:   Past Medical History:   Diagnosis Date    CHF (congestive heart failure)     ORELLANA (dyspnea on exertion)     Hypertension     Left ventricular systolic dysfunction (LVSD)     NSTEMI (non-ST elevated myocardial infarction)     Orthopnea     Stroke        Past Surgical Historical:   Past Surgical History:   Procedure Laterality Date    ANGIOGRAM, CORONARY, WITH LEFT HEART CATHETERIZATION Right 5/4/2023    Procedure: Angiogram, Coronary, with Left Heart Cath;  Surgeon: Minesh Jimenez MD;  Location: [facility] CATH LAB;  Service: Cardiology;  Laterality: Right;    INSTANTANEOUS WAVE-FREE RATIO (IFR) N/A 5/4/2023    Procedure: Instantaneous Wave-Free Ratio (IFR);  Surgeon: Minesh Jimenez MD;  Location: [facility] CATH LAB;  Service: Cardiology;  Laterality: N/A;    IVUS, CORONARY  5/4/2023    Procedure: IVUS, Coronary;  Surgeon: Minesh Jimenez MD;  Location: [facility] CATH LAB;  Service: Cardiology;;    STENT, DRUG ELUTING, SINGLE VESSEL, CORONARY  5/4/2023    Procedure: Stent, Drug Eluting, Single Vessel, Coronary;  Surgeon: Minesh Jimenez MD;  Location: [facility] CATH LAB;  Service: Cardiology;;    VENTRICULOGRAM, LEFT  5/4/2023    Procedure: Ventriculogram, Left;  Surgeon: Minesh Jimenez MD;  Location: [facility] CATH LAB;  Service: Cardiology;;        Medications:   Medication List with Changes/Refills   Current Medications    ASPIRIN 81  MG CHEW    Take 4 tablets (324 mg total) by mouth once. for 1 dose    ATORVASTATIN (LIPITOR) 80 MG TABLET    Take 1 tablet (80 mg total) by mouth once daily.    CHLORTHALIDONE (HYGROTEN) 25 MG TAB    Take 25 mg by mouth once daily.    CLOPIDOGREL (PLAVIX) 75 MG TABLET    Take 1 tablet (75 mg total) by mouth once daily.    DICLOFENAC SODIUM (VOLTAREN) 1 % GEL        ENTRESTO 24-26 MG PER TABLET    Take 1 tablet by mouth 2 (two) times daily.    HYDROCODONE-ACETAMINOPHEN (NORCO) 5-325 MG PER TABLET    Take 1 tablet by mouth every 6 (six) hours as needed for Pain.    JARDIANCE 10 MG TABLET    Take 10 mg by mouth.    KENALOG 40 MG/ML INJECTION    bring TO office FOR injection    KETOROLAC 0.5% (ACULAR) 0.5 % DROP    Place 1 drop into both eyes.    METOPROLOL SUCCINATE (TOPROL-XL) 50 MG 24 HR TABLET    Take 1 tablet (50 mg total) by mouth once daily.    NYSTATIN (MYCOSTATIN) CREAM    Apply topically 2 (two) times daily.    SYNJARDY XR 10-1,000 MG TBPH    Take 1 tablet by mouth once daily.    TAMSULOSIN (FLOMAX) 0.4 MG CAP    Take 1 capsule (0.4 mg total) by mouth once daily.    TERBINAFINE HCL (LAMISIL) 250 MG TABLET            Past Social History:   Social History     Socioeconomic History    Marital status:    Tobacco Use    Smoking status: Never    Smokeless tobacco: Never   Substance and Sexual Activity    Alcohol use: Not Currently    Drug use: Never     Social Determinants of Health     Financial Resource Strain: Patient Declined (1/26/2023)    Overall Financial Resource Strain (CARDIA)     Difficulty of Paying Living Expenses: Patient declined   Food Insecurity: Patient Declined (1/26/2023)    Hunger Vital Sign     Worried About Running Out of Food in the Last Year: Patient declined     Ran Out of Food in the Last Year: Patient declined   Transportation Needs: Patient Declined (1/26/2023)    PRAPARE - Transportation     Lack of Transportation (Medical): Patient declined     Lack of Transportation  (Non-Medical): Patient declined   Physical Activity: Patient Declined (1/26/2023)    Exercise Vital Sign     Days of Exercise per Week: Patient declined     Minutes of Exercise per Session: Patient declined   Stress: Patient Declined (1/26/2023)    [employer] [employer] - Occupational Stress Questionnaire     Feeling of Stress : Patient declined   Social Connections: Patient Declined (1/26/2023)    Social Connection and Isolation Panel [NHANES]     Frequency of Communication with Friends and Family: Patient declined     Frequency of Social Gatherings with Friends and Family: Patient declined     Attends Holiness Services: Patient declined     Active Member of Clubs or Organizations: Patient declined     Attends Club or Organization Meetings: Patient declined     Marital Status: Patient declined   Housing Stability: Unknown (1/26/2023)    Housing Stability Vital Sign     Unable to Pay for Housing in the Last Year: Patient refused     Unstable Housing in the Last Year: Patient refused       Allergies: Review of patient's allergies indicates:  No Known Allergies     Family History: History reviewed. No pertinent family history.     Review of Systems:  Review of Systems   Constitutional:  Negative for fever and unexpected weight change.   HENT:  Negative for facial swelling and trouble swallowing.    Eyes:  Negative for pain and visual disturbance.   Respiratory:  Negative for chest tightness, shortness of breath and wheezing.    Cardiovascular:  Negative for leg swelling.   Gastrointestinal:  Negative for abdominal distention, abdominal pain, anal bleeding, blood in stool and rectal pain.   Genitourinary:  Negative for decreased urine volume, difficulty urinating, dysuria, enuresis, flank pain, frequency, hematuria and urgency.   Musculoskeletal:  Negative for back pain.   Skin:  Negative for color change.   Neurological:  Negative for dizziness, seizures, syncope and weakness.    Psychiatric/Behavioral:  Negative for suicidal ideas.        Physical Exam:  Physical Exam  Constitutional:       Appearance: He is well-developed.   HENT:      Head: Normocephalic.   Eyes:      General: No scleral icterus.  Pulmonary:      Effort: Pulmonary effort is normal.      Breath sounds: Normal breath sounds.   Abdominal:      General: There is no distension.      Palpations: Abdomen is soft.      Tenderness: There is no abdominal tenderness.      Hernia: No hernia is present. There is no hernia in the right inguinal area or left inguinal area.   Genitourinary:     Penis: Normal.       Testes: Normal. Cremasteric reflex is present.   Musculoskeletal:      Cervical back: Normal range of motion.   Skin:     General: Skin is warm and dry.   Neurological:      Mental Status: He is alert and oriented to person, place, and time.         Assessment/Plan:    Bilateral renal calculi with recent small stone passage and a large stone burden residual intrarenal bilateral--wife states patient has had a rough year having a right-sided CVA in January this year he has also had a cardiac stent placement.  Discussion today centered around waiting till after the 1st 2 years patient is pain-free at this time.  Will schedule him back after the 1st of January to see Dr. Chang at that time in office [facility] to re-evaluate stone burden in care planning.  Patient will notify office prior to that scheduled appointment with any recurrence symptoms at which time will get him into the office and taken care of sooner.  Problem List Items Addressed This Visit    None  Visit Diagnoses       Renal calculi

## 2023-12-22 ENCOUNTER — HOSPITAL ENCOUNTER (EMERGENCY)
Facility: HOSPITAL | Age: 53
Discharge: ANOTHER HOSPITAL | End: 2023-12-22
Attending: EMERGENCY MEDICINE
Payer: MEDICAID

## 2023-12-22 VITALS
HEART RATE: 73 BPM | HEIGHT: 69 IN | OXYGEN SATURATION: 100 % | SYSTOLIC BLOOD PRESSURE: 108 MMHG | TEMPERATURE: 98 F | RESPIRATION RATE: 16 BRPM | BODY MASS INDEX: 28.73 KG/M2 | DIASTOLIC BLOOD PRESSURE: 65 MMHG | WEIGHT: 194 LBS

## 2023-12-22 DIAGNOSIS — R19.5 OCCULT GI BLEEDING: ICD-10-CM

## 2023-12-22 DIAGNOSIS — R55 NEAR SYNCOPE: Primary | ICD-10-CM

## 2023-12-22 DIAGNOSIS — D64.9 ANEMIA, UNSPECIFIED TYPE: ICD-10-CM

## 2023-12-22 LAB
ABORH RETYPE: NORMAL
ALBUMIN SERPL-MCNC: 3.3 G/DL (ref 3.5–5)
ALBUMIN/GLOB SERPL: 1.1 RATIO (ref 1.1–2)
ALP SERPL-CCNC: 71 UNIT/L (ref 40–150)
ALT SERPL-CCNC: 21 UNIT/L (ref 0–55)
AST SERPL-CCNC: 11 UNIT/L (ref 5–34)
BASOPHILS # BLD AUTO: 0.06 X10(3)/MCL
BASOPHILS NFR BLD AUTO: 0.4 %
BILIRUB SERPL-MCNC: 0.3 MG/DL
BUN SERPL-MCNC: 65 MG/DL (ref 8.4–25.7)
CALCIUM SERPL-MCNC: 9 MG/DL (ref 8.4–10.2)
CHLORIDE SERPL-SCNC: 111 MMOL/L (ref 98–107)
CO2 SERPL-SCNC: 20 MMOL/L (ref 22–29)
CREAT SERPL-MCNC: 1.08 MG/DL (ref 0.73–1.18)
EOSINOPHIL # BLD AUTO: 0.2 X10(3)/MCL (ref 0–0.9)
EOSINOPHIL NFR BLD AUTO: 1.4 %
ERYTHROCYTE [DISTWIDTH] IN BLOOD BY AUTOMATED COUNT: 13.2 % (ref 11.5–17)
GFR SERPLBLD CREATININE-BSD FMLA CKD-EPI: >60 MLS/MIN/1.73/M2
GLOBULIN SER-MCNC: 3.1 GM/DL (ref 2.4–3.5)
GLUCOSE SERPL-MCNC: 164 MG/DL (ref 74–100)
GROUP & RH: NORMAL
HCT VFR BLD AUTO: 26.1 % (ref 42–52)
HGB BLD-MCNC: 8.2 G/DL (ref 14–18)
IMM GRANULOCYTES # BLD AUTO: 0.09 X10(3)/MCL (ref 0–0.04)
IMM GRANULOCYTES NFR BLD AUTO: 0.6 %
INDIRECT COOMBS: NORMAL
LYMPHOCYTES # BLD AUTO: 1.57 X10(3)/MCL (ref 0.6–4.6)
LYMPHOCYTES NFR BLD AUTO: 10.9 %
MCH RBC QN AUTO: 31.2 PG (ref 27–31)
MCHC RBC AUTO-ENTMCNC: 31.4 G/DL (ref 33–36)
MCV RBC AUTO: 99.2 FL (ref 80–94)
MONOCYTES # BLD AUTO: 0.67 X10(3)/MCL (ref 0.1–1.3)
MONOCYTES NFR BLD AUTO: 4.6 %
NEUTROPHILS # BLD AUTO: 11.87 X10(3)/MCL (ref 2.1–9.2)
NEUTROPHILS NFR BLD AUTO: 82.1 %
PLATELET # BLD AUTO: 271 X10(3)/MCL (ref 130–400)
PMV BLD AUTO: 10.7 FL (ref 7.4–10.4)
POTASSIUM SERPL-SCNC: 4.2 MMOL/L (ref 3.5–5.1)
PROT SERPL-MCNC: 6.4 GM/DL (ref 6.4–8.3)
RBC # BLD AUTO: 2.63 X10(6)/MCL (ref 4.7–6.1)
SODIUM SERPL-SCNC: 140 MMOL/L (ref 136–145)
SPECIMEN OUTDATE: NORMAL
TROPONIN I SERPL-MCNC: <0.01 NG/ML (ref 0–0.04)
WBC # SPEC AUTO: 14.46 X10(3)/MCL (ref 4.5–11.5)

## 2023-12-22 PROCEDURE — 96374 THER/PROPH/DIAG INJ IV PUSH: CPT

## 2023-12-22 PROCEDURE — 85025 COMPLETE CBC W/AUTO DIFF WBC: CPT | Performed by: EMERGENCY MEDICINE

## 2023-12-22 PROCEDURE — C9113 INJ PANTOPRAZOLE SODIUM, VIA: HCPCS | Performed by: EMERGENCY MEDICINE

## 2023-12-22 PROCEDURE — 63600175 PHARM REV CODE 636 W HCPCS: Performed by: EMERGENCY MEDICINE

## 2023-12-22 PROCEDURE — 99284 EMERGENCY DEPT VISIT MOD MDM: CPT | Mod: 25

## 2023-12-22 PROCEDURE — 25000003 PHARM REV CODE 250: Performed by: EMERGENCY MEDICINE

## 2023-12-22 PROCEDURE — 93005 ELECTROCARDIOGRAM TRACING: CPT

## 2023-12-22 PROCEDURE — 80053 COMPREHEN METABOLIC PANEL: CPT | Performed by: EMERGENCY MEDICINE

## 2023-12-22 PROCEDURE — 84484 ASSAY OF TROPONIN QUANT: CPT | Performed by: EMERGENCY MEDICINE

## 2023-12-22 PROCEDURE — 96375 TX/PRO/DX INJ NEW DRUG ADDON: CPT

## 2023-12-22 PROCEDURE — 86850 RBC ANTIBODY SCREEN: CPT | Performed by: EMERGENCY MEDICINE

## 2023-12-22 RX ORDER — PANTOPRAZOLE SODIUM 40 MG/10ML
80 INJECTION, POWDER, LYOPHILIZED, FOR SOLUTION INTRAVENOUS
Status: COMPLETED | OUTPATIENT
Start: 2023-12-22 | End: 2023-12-22

## 2023-12-22 RX ORDER — ONDANSETRON 2 MG/ML
4 INJECTION INTRAMUSCULAR; INTRAVENOUS
Status: COMPLETED | OUTPATIENT
Start: 2023-12-22 | End: 2023-12-22

## 2023-12-22 RX ADMIN — PANTOPRAZOLE SODIUM 8 MG/HR: 40 INJECTION, POWDER, FOR SOLUTION INTRAVENOUS at 03:12

## 2023-12-22 RX ADMIN — ONDANSETRON 4 MG: 2 INJECTION INTRAMUSCULAR; INTRAVENOUS at 02:12

## 2023-12-22 RX ADMIN — SODIUM CHLORIDE, POTASSIUM CHLORIDE, SODIUM LACTATE AND CALCIUM CHLORIDE 1000 ML: 600; 310; 30; 20 INJECTION, SOLUTION INTRAVENOUS at 02:12

## 2023-12-22 RX ADMIN — PANTOPRAZOLE SODIUM 80 MG: 40 INJECTION, POWDER, FOR SOLUTION INTRAVENOUS at 03:12

## 2023-12-22 NOTE — ED PROVIDER NOTES
Encounter Date: 12/22/2023       History     Chief Complaint   Patient presents with    Loss of Consciousness     Brought per [facility] for c/o going unresponsive for a few seconds while sitting down at home     The history is provided by the patient and the EMS personnel.   Loss of Consciousness  This is a new problem. The current episode started less than 1 hour ago. The problem occurs rarely. The problem has been rapidly improving. Pertinent negatives include no chest pain, no abdominal pain, no headaches and no shortness of breath. The symptoms are aggravated by standing. Nothing relieves the symptoms.   States he was in his usual states of health when he went to sit up and suddenly became lightheaded and nauseated and slumped back in the chair.  Did not completely lose consciousness, but states he is not sure what happened during that interval.  Recently started on Flomax for treatment of kidney stone - lithotripsy being tentatively planned.    Review of patient's allergies indicates:  No Known Allergies  Past Medical History:   Diagnosis Date    CHF (congestive heart failure)     ORELLANA (dyspnea on exertion)     Hypertension     Left ventricular systolic dysfunction (LVSD)     NSTEMI (non-ST elevated myocardial infarction)     Orthopnea     Stroke      Past Surgical History:   Procedure Laterality Date    ANGIOGRAM, CORONARY, WITH LEFT HEART CATHETERIZATION Right 5/4/2023    Procedure: Angiogram, Coronary, with Left Heart Cath;  Surgeon: Minesh Jimenez MD;  Location: [facility] CATH LAB;  Service: Cardiology;  Laterality: Right;    INSTANTANEOUS WAVE-FREE RATIO (IFR) N/A 5/4/2023    Procedure: Instantaneous Wave-Free Ratio (IFR);  Surgeon: Minesh Jimenez MD;  Location: [facility] CATH LAB;  Service: Cardiology;  Laterality: N/A;    IVUS, CORONARY  5/4/2023    Procedure: IVUS, Coronary;  Surgeon: Minesh Jimenez MD;  Location: [facility] CATH LAB;  Service: Cardiology;;    STENT, DRUG ELUTING, SINGLE VESSEL, CORONARY  5/4/2023     Procedure: Stent, Drug Eluting, Single Vessel, Coronary;  Surgeon: Minesh Jimenez MD;  Location: [facility] CATH LAB;  Service: Cardiology;;    VENTRICULOGRAM, LEFT  5/4/2023    Procedure: Ventriculogram, Left;  Surgeon: Minesh Jimenez MD;  Location: [facility] CATH LAB;  Service: Cardiology;;     No family history on file.  Social History     Tobacco Use    Smoking status: Never    Smokeless tobacco: Never   Substance Use Topics    Alcohol use: Not Currently    Drug use: Never     Review of Systems   Constitutional:  Negative for fever.   HENT:  Negative for sore throat.    Respiratory:  Negative for shortness of breath.    Cardiovascular:  Positive for syncope. Negative for chest pain.   Gastrointestinal:  Negative for abdominal pain, anal bleeding, blood in stool and nausea.   Genitourinary:  Negative for dysuria.   Musculoskeletal:  Negative for back pain.   Skin:  Negative for rash.   Neurological:  Negative for weakness and headaches.   Hematological:  Does not bruise/bleed easily.       Physical Exam     Initial Vitals [12/22/23 1402]   BP Pulse Resp Temp SpO2   (!) 93/47 82 16 98.1 °F (36.7 °C) 97 %      MAP       --         Physical Exam    Nursing note and vitals reviewed.  Constitutional: He appears well-developed and well-nourished.   HENT:   Head: Normocephalic and atraumatic.   Right Ear: External ear normal.   Left Ear: External ear normal.   Nose: Nose normal.   Eyes: Conjunctivae and EOM are normal. Pupils are equal, round, and reactive to light.   Neck: Neck supple.   Normal range of motion.  Cardiovascular:  Normal rate, regular rhythm, normal heart sounds and intact distal pulses.           Pulmonary/Chest: Breath sounds normal.   Abdominal: Abdomen is soft. Bowel sounds are normal.   Musculoskeletal:         General: Normal range of motion.      Cervical back: Normal range of motion and neck supple.     Neurological: He is alert and oriented to person, place, and time. He has normal strength. GCS score is  15. GCS eye subscore is 4. GCS verbal subscore is 5. GCS motor subscore is 6.   Skin: Skin is warm and dry. Capillary refill takes less than 2 seconds. There is pallor.   Psychiatric: He has a normal mood and affect. His behavior is normal. Judgment and thought content normal.         ED Course   Procedures  Labs Reviewed   COMPREHENSIVE METABOLIC PANEL - Abnormal; Notable for the following components:       Result Value    Chloride 111 (*)     Carbon Dioxide 20 (*)     Glucose Level 164 (*)     Blood Urea Nitrogen 65.0 (*)     Albumin Level 3.3 (*)     All other components within normal limits   CBC WITH DIFFERENTIAL - Abnormal; Notable for the following components:    WBC 14.46 (*)     RBC 2.63 (*)     Hgb 8.2 (*)     Hct 26.1 (*)     MCV 99.2 (*)     MCH 31.2 (*)     MCHC 31.4 (*)     MPV 10.7 (*)     Neut # 11.87 (*)     IG# 0.09 (*)     All other components within normal limits   TROPONIN I - Normal   CBC W/ AUTO DIFFERENTIAL    Narrative:     The following orders were created for panel order CBC auto differential.  Procedure                               Abnormality         Status                     ---------                               -----------         ------                     CBC with Differential[[id number]]       Abnormal            Final result                 Please view results for these tests on the individual orders.   TYPE & SCREEN   ABORH RETYPE     EKG Readings: (Independently Interpreted)   Initial Reading: No STEMI. Rhythm: Normal Sinus Rhythm. Heart Rate: 79. Ectopy: No Ectopy. Conduction: Normal. ST Segments: Normal ST Segments. T Waves: Normal. Axis: Normal. Clinical Impression: Normal Sinus Rhythm       Imaging Results    None          Medications   lactated ringers bolus 1,000 mL (1,000 mLs Intravenous New Bag 12/22/23 1443)   ondansetron injection 4 mg (4 mg Intravenous Given 12/22/23 1442)   pantoprazole injection 80 mg (80 mg Intravenous Given 12/22/23 1533)   pantoprazole  (PROTONIX) 40 mg in sodium chloride 0.9 % 100 mL IVPB (MB+) (8 mg/hr Intravenous New Bag 12/22/23 1531)     Medical Decision Making  Amount and/or Complexity of Data Reviewed  Labs: ordered. Decision-making details documented in ED Course.  ECG/medicine tests: ordered and independent interpretation performed. Decision-making details documented in ED Course.    Risk  Prescription drug management.    Differential includes:  vasovagal, medication side effects, orthostatic hypotension, anemia, dehydration, dysrhythmia, KYM.  Will obtain EKG, CBC, CMP, troponin, orthostatic VS.  Nearly all of patient's medications have been taken for a long time, including Entresto.  However, the Flomax was recently added and orthostatic hypotension is a common side effect.  Consider discontinuing Flomax if work-up is unremarkable.  Will give IVF bolus.             ED Course as of 12/22/23 1747   Fri Dec 22, 2023   1502 Discussed low H&H with patient and his wife (Hgb > 5 point drop over past 3 weeks) and elevated BUN.  All signs point to upper GI bleeding.  Patient denies melena or BRBPR, but doesn't seem super-confident in his answer.  Plavix use certainly increases risk.  Will admit for serial H&H and surgery consult for EGD.  Type and screen ordered and PPI infusion. [CL]   1520 House supervisor tells me there are no beds available at this facility.  [facility] order placed to initiate transfer.  Patient and family updated. [CL]   1624 Spoke with Xavier in transfer center - states he should be able to get a bed at [facility], as they have had many discharges from the floor today.  Family updated. [CL]   1915 Xvaier called back and states [facility] had 2 RN call-in's for tonight, so they are no longer able to accept patient.  He put me in contact with Dr. Freire ([facility] ED) and he is accepted to that facility. [CL]      ED Course User Index  [CL] Woodrow Sevilla MD                           Clinical Impression:  Final diagnoses:  [R55] Near  syncope (Primary)  [R19.5] Occult GI bleeding  [D64.9] Anemia, unspecified type          ED Disposition Condition    Transfer to Another Facility Stable                Woodrow Sevilla MD  12/22/23 5224

## 2024-02-01 ENCOUNTER — OFFICE VISIT (OUTPATIENT)
Dept: UROLOGY | Facility: CLINIC | Age: 54
End: 2024-02-01
Payer: MEDICAID

## 2024-02-01 ENCOUNTER — HOSPITAL ENCOUNTER (OUTPATIENT)
Dept: RADIOLOGY | Facility: CLINIC | Age: 54
Discharge: HOME | End: 2024-02-01
Attending: UROLOGY
Payer: MEDICAID

## 2024-02-01 VITALS
HEIGHT: 69 IN | WEIGHT: 194 LBS | BODY MASS INDEX: 28.73 KG/M2 | HEART RATE: 72 BPM | DIASTOLIC BLOOD PRESSURE: 62 MMHG | SYSTOLIC BLOOD PRESSURE: 127 MMHG

## 2024-02-01 DIAGNOSIS — N20.0 RENAL CALCULI: ICD-10-CM

## 2024-02-01 DIAGNOSIS — N20.0 RENAL CALCULI: Primary | ICD-10-CM

## 2024-02-01 PROCEDURE — 4010F ACE/ARB THERAPY RXD/TAKEN: CPT | Mod: CPTII,S$GLB,, | Performed by: UROLOGY

## 2024-02-01 PROCEDURE — 3074F SYST BP LT 130 MM HG: CPT | Mod: CPTII,S$GLB,, | Performed by: UROLOGY

## 2024-02-01 PROCEDURE — 99215 OFFICE O/P EST HI 40 MIN: CPT | Mod: S$GLB,,, | Performed by: UROLOGY

## 2024-02-01 PROCEDURE — 74018 RADEX ABDOMEN 1 VIEW: CPT | Mod: 26,,, | Performed by: RADIOLOGY

## 2024-02-01 PROCEDURE — 74018 RADEX ABDOMEN 1 VIEW: CPT | Mod: TC,,, | Performed by: UROLOGY

## 2024-02-01 PROCEDURE — 3078F DIAST BP <80 MM HG: CPT | Mod: CPTII,S$GLB,, | Performed by: UROLOGY

## 2024-02-01 PROCEDURE — 1159F MED LIST DOCD IN RCRD: CPT | Mod: CPTII,S$GLB,, | Performed by: UROLOGY

## 2024-02-01 PROCEDURE — 3008F BODY MASS INDEX DOCD: CPT | Mod: CPTII,S$GLB,, | Performed by: UROLOGY

## 2024-02-01 RX ORDER — ESOMEPRAZOLE MAGNESIUM 40 MG/1
40 CAPSULE, DELAYED RELEASE ORAL
COMMUNITY
Start: 2023-12-24 | End: 2024-03-15

## 2024-02-01 RX ORDER — FERROUS SULFATE TAB 325 MG (65 MG ELEMENTAL FE) 325 (65 FE) MG
325 TAB ORAL EVERY OTHER DAY
COMMUNITY

## 2024-02-01 RX ORDER — MUPIROCIN 20 MG/G
OINTMENT TOPICAL
COMMUNITY
Start: 2024-01-30 | End: 2024-03-15

## 2024-02-01 NOTE — PROGRESS NOTES
Subjective:       Patient ID: Manpreet Josue is a 53 y.o. male.    Chief Complaint: Nephrolithiasis      HPI: 53-year-old male patient following up for recent kidney stones.  Patient had recent CT done noting moderate left hydronephrosis and left hydroureter secondary to a 4 mm calculus at the UVJ.  Patient recently passed what appears to be 4 mm stone.  Additional findings on the CT were right hydronephrosis and right hydroureter.  He is 2 small calculi in the dependent aspect of the urinary bladder.  There is a 21 x 12 mm calculus in the right renal pelvis.  Multiple smaller bilateral renal calculi.     CT AVAILABLE IN EPIC NOT MEDITECH         Past Medical History:   Past Medical History:   Diagnosis Date    CHF (congestive heart failure)     ORELLANA (dyspnea on exertion)     Hypertension     Left ventricular systolic dysfunction (LVSD)     NSTEMI (non-ST elevated myocardial infarction)     Orthopnea     Stroke        Past Surgical Historical:   Past Surgical History:   Procedure Laterality Date    ANGIOGRAM, CORONARY, WITH LEFT HEART CATHETERIZATION Right 5/4/2023    Procedure: Angiogram, Coronary, with Left Heart Cath;  Surgeon: Minesh Jimenez MD;  Location: [facility] CATH LAB;  Service: Cardiology;  Laterality: Right;    INSTANTANEOUS WAVE-FREE RATIO (IFR) N/A 5/4/2023    Procedure: Instantaneous Wave-Free Ratio (IFR);  Surgeon: Minesh Jimenez MD;  Location: [facility] CATH LAB;  Service: Cardiology;  Laterality: N/A;    IVUS, CORONARY  5/4/2023    Procedure: IVUS, Coronary;  Surgeon: Minesh Jimenez MD;  Location: [facility] CATH LAB;  Service: Cardiology;;    STENT, DRUG ELUTING, SINGLE VESSEL, CORONARY  5/4/2023    Procedure: Stent, Drug Eluting, Single Vessel, Coronary;  Surgeon: Minesh Jimenez MD;  Location: [facility] CATH LAB;  Service: Cardiology;;    VENTRICULOGRAM, LEFT  5/4/2023    Procedure: Ventriculogram, Left;  Surgeon: Minesh Jimenez MD;  Location: [facility] CATH LAB;  Service: Cardiology;;        Medications:   Medication  List with Changes/Refills   Current Medications    ASPIRIN 81 MG CHEW    Take 4 tablets (324 mg total) by mouth once. for 1 dose    ATORVASTATIN (LIPITOR) 80 MG TABLET    Take 1 tablet (80 mg total) by mouth once daily.    CHLORTHALIDONE (HYGROTEN) 25 MG TAB    Take 25 mg by mouth once daily.    CLOPIDOGREL (PLAVIX) 75 MG TABLET    Take 1 tablet (75 mg total) by mouth once daily.    DICLOFENAC SODIUM (VOLTAREN) 1 % GEL        ENTRESTO 24-26 MG PER TABLET    Take 1 tablet by mouth 2 (two) times daily.    ESOMEPRAZOLE (NEXIUM) 40 MG CAPSULE    Take 40 mg by mouth.    FEROSUL 325 MG (65 MG IRON) TAB TABLET    Take by mouth.    HYDROCODONE-ACETAMINOPHEN (NORCO) 5-325 MG PER TABLET    Take 1 tablet by mouth every 6 (six) hours as needed for Pain.    JARDIANCE 10 MG TABLET    Take 10 mg by mouth.    KENALOG 40 MG/ML INJECTION    bring TO office FOR injection    KETOROLAC 0.5% (ACULAR) 0.5 % DROP    Place 1 drop into both eyes.    METOPROLOL SUCCINATE (TOPROL-XL) 50 MG 24 HR TABLET    Take 1 tablet (50 mg total) by mouth once daily.    MUPIROCIN (BACTROBAN) 2 % OINTMENT        NYSTATIN (MYCOSTATIN) CREAM    Apply topically 2 (two) times daily.    SYNJARDY XR 10-1,000 MG TBPH    Take 1 tablet by mouth once daily.    TAMSULOSIN (FLOMAX) 0.4 MG CAP    Take 1 capsule (0.4 mg total) by mouth once daily.    TERBINAFINE HCL (LAMISIL) 250 MG TABLET            Past Social History:   Social History     Socioeconomic History    Marital status:    Tobacco Use    Smoking status: Never    Smokeless tobacco: Never   Substance and Sexual Activity    Alcohol use: Not Currently    Drug use: Never     Social Determinants of Health     Financial Resource Strain: Patient Declined (1/26/2023)    Overall Financial Resource Strain (CARDIA)     Difficulty of Paying Living Expenses: Patient declined   Food Insecurity: Patient Declined (1/26/2023)    Hunger Vital Sign     Worried About Running Out of Food in the Last Year: Patient declined      Ran Out of Food in the Last Year: Patient declined   Transportation Needs: Patient Declined (1/26/2023)    PRAPARE - Transportation     Lack of Transportation (Medical): Patient declined     Lack of Transportation (Non-Medical): Patient declined   Physical Activity: Patient Declined (1/26/2023)    Exercise Vital Sign     Days of Exercise per Week: Patient declined     Minutes of Exercise per Session: Patient declined   Stress: Patient Declined (1/26/2023)    [employer] [place] [employer] - Occupational Stress Questionnaire     Feeling of Stress : Patient declined   Social Connections: Patient Declined (1/26/2023)    Social Connection and Isolation Panel [NHANES]     Frequency of Communication with Friends and Family: Patient declined     Frequency of Social Gatherings with Friends and Family: Patient declined     Attends [employer] Services: Patient declined     Active Member of Clubs or Organizations: Patient declined     Attends Club or Organization Meetings: Patient declined     Marital Status: Patient declined   Housing Stability: Unknown (1/26/2023)    Housing Stability Vital Sign     Unable to Pay for Housing in the Last Year: Patient refused     Unstable Housing in the Last Year: Patient refused       Allergies: Review of patient's allergies indicates:  No Known Allergies     Family History: No family history on file.     Review of Systems:  Review of Systems   Constitutional: Negative.    HENT: Negative.     Eyes: Negative.    Respiratory: Negative.     Cardiovascular: Negative.    Gastrointestinal: Negative.    Endocrine: Negative.    Genitourinary: Negative.    Musculoskeletal: Negative.    Skin: Negative.    Allergic/Immunologic: Negative.    Neurological: Negative.    Hematological: Negative.    Psychiatric/Behavioral: Negative.         Physical Exam:  Physical Exam  Constitutional:       Appearance: He is normal weight.   HENT:      Head: Normocephalic.      Nose: Nose normal.       Mouth/Throat:      Mouth: Mucous membranes are moist.      Pharynx: Oropharynx is clear.   Eyes:      Extraocular Movements: Extraocular movements intact.      Conjunctiva/sclera: Conjunctivae normal.      Pupils: Pupils are equal, round, and reactive to light.   Cardiovascular:      Rate and Rhythm: Normal rate and regular rhythm.      Pulses: Normal pulses.      Heart sounds: Normal heart sounds.   Pulmonary:      Effort: Pulmonary effort is normal.      Breath sounds: Normal breath sounds.   Abdominal:      General: Abdomen is flat. Bowel sounds are normal.      Palpations: Abdomen is soft.      Hernia: There is no hernia in the right inguinal area or left inguinal area.   Genitourinary:     Penis: Normal. No phimosis, paraphimosis, hypospadias, erythema, tenderness or discharge.       Testes: Normal.         Right: Mass, tenderness or swelling not present. Right testis is descended. Cremasteric reflex is present.          Left: Mass, tenderness or swelling not present. Left testis is descended. Cremasteric reflex is present.       Prostate: Normal.      Rectum: Normal.   Musculoskeletal:         General: Normal range of motion.      Cervical back: Normal range of motion and neck supple.   Lymphadenopathy:      Lower Body: No right inguinal adenopathy. No left inguinal adenopathy.   Skin:     General: Skin is warm and dry.      Capillary Refill: Capillary refill takes less than 2 seconds.   Neurological:      General: No focal deficit present.      Mental Status: He is alert and oriented to person, place, and time. Mental status is at baseline.   Psychiatric:         Mood and Affect: Mood normal.         Behavior: Behavior normal.         Thought Content: Thought content normal.         Judgment: Judgment normal.         Assessment/Plan:     Kidney stone:  Patient has multiple bilateral kidney stones.  He will need right PCNL for the 21 x 12 mm calcification in the right renal pelvis.  Patient will call when he  is ready to schedule this procedure.  KUB and CT personally reviewed    I, Dr. Jeff Chang have seen and personally evaluated the patient. I have formulated the plan reviewed all pertinent imaging and clinical data.  I agree with the nurse practitioner's assessment, and I have personally formulated the plan for this patient's care as described by the midlevel.  Problem List Items Addressed This Visit    None  Visit Diagnoses       Renal calculi    -  Primary    Relevant Orders    Urinary Stone Analysis    X-Ray Abdomen AP 1 View (Completed)

## 2024-03-04 ENCOUNTER — HOSPITAL ENCOUNTER (OUTPATIENT)
Dept: RADIOLOGY | Facility: HOSPITAL | Age: 54
Discharge: HOME | End: 2024-03-04
Attending: UROLOGY
Payer: MEDICAID

## 2024-03-04 DIAGNOSIS — N20.0 KIDNEY STONE: ICD-10-CM

## 2024-03-04 DIAGNOSIS — R10.9 ABDOMINAL PAIN: ICD-10-CM

## 2024-03-04 PROCEDURE — 74019 RADEX ABDOMEN 2 VIEWS: CPT | Mod: TC

## 2024-03-15 ENCOUNTER — CLINICAL SUPPORT (OUTPATIENT)
Dept: RESPIRATORY THERAPY | Facility: HOSPITAL | Age: 54
End: 2024-03-15
Attending: UROLOGY
Payer: MEDICAID

## 2024-03-15 ENCOUNTER — HOSPITAL ENCOUNTER (OUTPATIENT)
Dept: RADIOLOGY | Facility: HOSPITAL | Age: 54
Discharge: HOME | End: 2024-03-15
Attending: UROLOGY
Payer: MEDICAID

## 2024-03-15 DIAGNOSIS — N20.0 URIC ACID NEPHROLITHIASIS: Primary | ICD-10-CM

## 2024-03-15 DIAGNOSIS — N20.0 URIC ACID NEPHROLITHIASIS: ICD-10-CM

## 2024-03-15 DIAGNOSIS — Z01.811 PRE-OP CHEST EXAM: ICD-10-CM

## 2024-03-15 LAB
OHS QRS DURATION: 104 MS
OHS QTC CALCULATION: 438 MS

## 2024-03-15 PROCEDURE — 71046 X-RAY EXAM CHEST 2 VIEWS: CPT | Mod: TC

## 2024-03-15 PROCEDURE — 93005 ELECTROCARDIOGRAM TRACING: CPT

## 2024-03-15 RX ORDER — PANTOPRAZOLE SODIUM 40 MG/1
40 TABLET, DELAYED RELEASE ORAL EVERY MORNING
COMMUNITY
Start: 2024-03-13

## 2024-03-15 NOTE — DISCHARGE INSTRUCTIONS
Nothing to eat or drink after midnight. Stop Plavix 3/13/24. Take Entresto and Metoprolol AM of procedure with small sip of water.       DRINK WATER TODAY.

## 2024-03-17 ENCOUNTER — PATIENT MESSAGE (OUTPATIENT)
Dept: ADMINISTRATIVE | Facility: OTHER | Age: 54
End: 2024-03-17
Payer: MEDICAID

## 2024-03-18 ENCOUNTER — PATIENT MESSAGE (OUTPATIENT)
Dept: ADMINISTRATIVE | Facility: OTHER | Age: 54
End: 2024-03-18
Payer: MEDICAID

## 2024-03-18 ENCOUNTER — ANESTHESIA EVENT (OUTPATIENT)
Dept: SURGERY | Facility: HOSPITAL | Age: 54
End: 2024-03-18
Payer: MEDICAID

## 2024-03-18 NOTE — ANESTHESIA PREPROCEDURE EVALUATION
03/18/2024  Manpreet Josue is a 53 y.o., male.      Pre-op Assessment    I have reviewed the Patient Summary Reports.    I have reviewed the NPO Status.   I have reviewed the Medications.     Review of Systems  Anesthesia Hx:  No problems with previous Anesthesia   Neg history of prior surgery.          Denies Family Hx of Anesthesia complications.    Denies Personal Hx of Anesthesia complications.                    Social:  Non-Smoker       Hematology/Oncology:  Hematology Normal   Oncology Normal                                   EENT/Dental:  EENT/Dental Normal           Cardiovascular:     Hypertension  Past MI        CHF Orthopnea    ORELLANA                            Pulmonary:      Shortness of breath                  Renal/:  Chronic Renal Disease renal calculi               Hepatic/GI:  Hepatic/GI Normal                 Neurological:   CVA                                    Endocrine:  Diabetes           Dermatological:  Skin Normal    Psych:  Psychiatric Normal                    Physical Exam  General: Cooperative and Alert    Airway:  Mallampati: II   Mouth Opening: Normal  TM Distance: Normal  Tongue: Normal  Neck ROM: Normal ROM    Dental:  Intact        Anesthesia Plan  Type of Anesthesia, risks & benefits discussed:    Anesthesia Type: MAC  Intra-op Monitoring Plan: Standard ASA Monitors  Post Op Pain Control Plan: multimodal analgesia  Induction:  IV  Informed Consent: Informed consent signed with the Patient and all parties understand the risks and agree with anesthesia plan.  All questions answered. Patient consented to blood products? Yes  ASA Score: 3  Day of Surgery Review of History & Physical: I have interviewed and examined the patient. I have reviewed the patient's H&P dated: There are no significant changes.     Ready For Surgery From Anesthesia Perspective.     .

## 2024-03-20 ENCOUNTER — HOSPITAL ENCOUNTER (OUTPATIENT)
Facility: HOSPITAL | Age: 54
Discharge: HOME | End: 2024-03-20
Attending: UROLOGY | Admitting: UROLOGY
Payer: MEDICAID

## 2024-03-20 ENCOUNTER — ANESTHESIA (OUTPATIENT)
Dept: SURGERY | Facility: HOSPITAL | Age: 54
End: 2024-03-20
Payer: MEDICAID

## 2024-03-20 VITALS
SYSTOLIC BLOOD PRESSURE: 129 MMHG | DIASTOLIC BLOOD PRESSURE: 67 MMHG | OXYGEN SATURATION: 99 % | RESPIRATION RATE: 8 BRPM | HEART RATE: 69 BPM

## 2024-03-20 DIAGNOSIS — N20.0 RENAL CALCULI: Primary | ICD-10-CM

## 2024-03-20 LAB — POCT GLUCOSE: 106 MG/DL (ref 70–110)

## 2024-03-20 PROCEDURE — 63600175 PHARM REV CODE 636 W HCPCS: Performed by: UROLOGY

## 2024-03-20 PROCEDURE — 71000015 HC POSTOP RECOV 1ST HR: Performed by: UROLOGY

## 2024-03-20 PROCEDURE — 71000016 HC POSTOP RECOV ADDL HR: Performed by: UROLOGY

## 2024-03-20 PROCEDURE — C2617 STENT, NON-COR, TEM W/O DEL: HCPCS | Performed by: UROLOGY

## 2024-03-20 PROCEDURE — D9220A PRA ANESTHESIA: Mod: ,,, | Performed by: NURSE ANESTHETIST, CERTIFIED REGISTERED

## 2024-03-20 PROCEDURE — C1747 OPTIME ENDOSCOPE, SINGLE, URINARY TRACT: HCPCS | Performed by: UROLOGY

## 2024-03-20 PROCEDURE — 37000008 HC ANESTHESIA 1ST 15 MINUTES: Performed by: UROLOGY

## 2024-03-20 PROCEDURE — 63600175 PHARM REV CODE 636 W HCPCS

## 2024-03-20 PROCEDURE — 36000707: Performed by: UROLOGY

## 2024-03-20 PROCEDURE — 36000706: Performed by: UROLOGY

## 2024-03-20 PROCEDURE — C1773 RET DEV, INSERTABLE: HCPCS | Performed by: UROLOGY

## 2024-03-20 PROCEDURE — C1758 CATHETER, URETERAL: HCPCS | Performed by: UROLOGY

## 2024-03-20 PROCEDURE — C1769 GUIDE WIRE: HCPCS | Performed by: UROLOGY

## 2024-03-20 PROCEDURE — C1894 INTRO/SHEATH, NON-LASER: HCPCS | Performed by: UROLOGY

## 2024-03-20 PROCEDURE — 27201423 OPTIME MED/SURG SUP & DEVICES STERILE SUPPLY: Performed by: UROLOGY

## 2024-03-20 PROCEDURE — 37000009 HC ANESTHESIA EA ADD 15 MINS: Performed by: UROLOGY

## 2024-03-20 PROCEDURE — 25000003 PHARM REV CODE 250: Performed by: NURSE ANESTHETIST, CERTIFIED REGISTERED

## 2024-03-20 PROCEDURE — 25000003 PHARM REV CODE 250: Performed by: UROLOGY

## 2024-03-20 PROCEDURE — 63600175 PHARM REV CODE 636 W HCPCS: Performed by: ANESTHESIOLOGY

## 2024-03-20 PROCEDURE — 82365 CALCULUS SPECTROSCOPY: CPT

## 2024-03-20 PROCEDURE — 63600175 PHARM REV CODE 636 W HCPCS: Performed by: NURSE ANESTHETIST, CERTIFIED REGISTERED

## 2024-03-20 PROCEDURE — 71000033 HC RECOVERY, INTIAL HOUR: Performed by: UROLOGY

## 2024-03-20 PROCEDURE — 88300 SURGICAL PATH GROSS: CPT | Performed by: UROLOGY

## 2024-03-20 DEVICE — URETERAL STENT
Type: IMPLANTABLE DEVICE | Site: URETER | Status: FUNCTIONAL
Brand: PERCUFLEX™

## 2024-03-20 RX ORDER — DEXAMETHASONE SODIUM PHOSPHATE 4 MG/ML
INJECTION, SOLUTION INTRA-ARTICULAR; INTRALESIONAL; INTRAMUSCULAR; INTRAVENOUS; SOFT TISSUE
Status: DISCONTINUED | OUTPATIENT
Start: 2024-03-20 | End: 2024-03-20

## 2024-03-20 RX ORDER — LIDOCAINE HYDROCHLORIDE 20 MG/ML
INJECTION INTRAVENOUS
Status: DISCONTINUED | OUTPATIENT
Start: 2024-03-20 | End: 2024-03-20

## 2024-03-20 RX ORDER — ONDANSETRON HYDROCHLORIDE 2 MG/ML
INJECTION, SOLUTION INTRAMUSCULAR; INTRAVENOUS
Status: DISCONTINUED | OUTPATIENT
Start: 2024-03-20 | End: 2024-03-20

## 2024-03-20 RX ORDER — PROPOFOL 10 MG/ML
VIAL (ML) INTRAVENOUS
Status: DISCONTINUED | OUTPATIENT
Start: 2024-03-20 | End: 2024-03-20

## 2024-03-20 RX ORDER — CEFAZOLIN SODIUM 2 G/50ML
2 SOLUTION INTRAVENOUS
Status: DISCONTINUED | OUTPATIENT
Start: 2024-03-20 | End: 2024-03-20

## 2024-03-20 RX ORDER — SODIUM CHLORIDE, SODIUM LACTATE, POTASSIUM CHLORIDE, CALCIUM CHLORIDE 600; 310; 30; 20 MG/100ML; MG/100ML; MG/100ML; MG/100ML
INJECTION, SOLUTION INTRAVENOUS CONTINUOUS
Status: ACTIVE | OUTPATIENT
Start: 2024-03-20

## 2024-03-20 RX ORDER — CIPROFLOXACIN 500 MG/1
500 TABLET ORAL EVERY 12 HOURS
Qty: 14 TABLET | Refills: 0 | Status: SHIPPED | OUTPATIENT
Start: 2024-03-20 | End: 2024-03-27

## 2024-03-20 RX ORDER — HYDROMORPHONE HYDROCHLORIDE 2 MG/ML
INJECTION, SOLUTION INTRAMUSCULAR; INTRAVENOUS; SUBCUTANEOUS
Status: DISCONTINUED | OUTPATIENT
Start: 2024-03-20 | End: 2024-03-20

## 2024-03-20 RX ORDER — EPHEDRINE SULFATE 50 MG/ML
INJECTION, SOLUTION INTRAVENOUS
Status: DISCONTINUED | OUTPATIENT
Start: 2024-03-20 | End: 2024-03-20

## 2024-03-20 RX ADMIN — ONDANSETRON HYDROCHLORIDE 4 MG: 2 INJECTION, SOLUTION INTRAMUSCULAR; INTRAVENOUS at 11:03

## 2024-03-20 RX ADMIN — LIDOCAINE HYDROCHLORIDE 20 MG: 20 INJECTION, SOLUTION INTRAVENOUS at 11:03

## 2024-03-20 RX ADMIN — DEXAMETHASONE SODIUM PHOSPHATE 8 MG: 4 INJECTION, SOLUTION INTRA-ARTICULAR; INTRALESIONAL; INTRAMUSCULAR; INTRAVENOUS; SOFT TISSUE at 12:03

## 2024-03-20 RX ADMIN — SODIUM CHLORIDE, POTASSIUM CHLORIDE, SODIUM LACTATE AND CALCIUM CHLORIDE: 600; 310; 30; 20 INJECTION, SOLUTION INTRAVENOUS at 10:03

## 2024-03-20 RX ADMIN — HYDROMORPHONE HYDROCHLORIDE 0.6 MG: 2 INJECTION INTRAMUSCULAR; INTRAVENOUS; SUBCUTANEOUS at 12:03

## 2024-03-20 RX ADMIN — GENTAMICIN SULFATE 120 MG: 40 INJECTION, SOLUTION INTRAMUSCULAR; INTRAVENOUS at 12:03

## 2024-03-20 RX ADMIN — EPHEDRINE SULFATE 25 MG: 50 INJECTION INTRAVENOUS at 12:03

## 2024-03-20 RX ADMIN — HYDROMORPHONE HYDROCHLORIDE 1 MG: 2 INJECTION INTRAMUSCULAR; INTRAVENOUS; SUBCUTANEOUS at 11:03

## 2024-03-20 RX ADMIN — DEXTROSE MONOHYDRATE 2 G: 5 INJECTION, SOLUTION INTRAVENOUS at 12:03

## 2024-03-20 RX ADMIN — PROPOFOL 150 MG: 10 INJECTION, EMULSION INTRAVENOUS at 11:03

## 2024-03-20 RX ADMIN — HYDROMORPHONE HYDROCHLORIDE 0.4 MG: 2 INJECTION INTRAMUSCULAR; INTRAVENOUS; SUBCUTANEOUS at 12:03

## 2024-03-20 NOTE — OP NOTE
SURGEON:  Alban Trujillo MD     PREOPERATIVE DIAGNOSIS(ES):  left calculus.    POSTOPERATIVE DIAGNOSIS(ES):  left calculus.    PROCEDURE(S)/OPERATION(S) PERFORMED:  1. left ureteroscopy with Holmium laser lithotripsy.  2. left double-J ureteral stent placement.    ANESTHESIA:  General.    FLUIDS ADMINISTERED:  1000 mL crystalloid.    ESTIMATED BLOOD LOSS:  5 mL.    SPECIMENS:  left renal stone sent for stone analysis.    FINDINGS:   The patient was endoscopically and radiographically stone free at the end of the case.    COMPLICATIONS:  None.    DRAINS:  6-Malawian x 26 cm left double-J ureteral stent.    SUMMARY:  After informed consent was obtained, the patient was brought to the operating room and placed  in the supine position. After adequate general anesthetic, the patient was positioned in dorsal  lithotomy and genitalia prepped and draped in a sterile manner. A time-out was performed with  the patient identified using 2 identifiers and the procedure site verified. The 21-Malawian rigid  cystoscope was advanced into the bladder and the bladder was drained. The bladder was  thoroughly examined with 30 and 70-degree lenses. The ureteral orifices were in the normal  position. The indwelling double-J stent was visualized and brought outside the urethral meatus  with a grasper. We advanced an angle-tip Glidewire up the lumen of the stent into the left ureter  and renal pelvis under fluoroscopic guidance. We then removed the stent.      Next we passed a 12/14 Malawian Cook Flexor ureteral access sheath over the working wire  and into the ureter under fluoroscopic guidance. It passed easily to the level of the upper ureter. The  inner dilator and working wire were then removed.    We then advanced a digital flexible ureteroscope into the access sheath and examined  the ureter, renal pelvis, and calyces. We visualized multiple stone in the renal pelvis. We used a 273 micron  Holmium laser fiber to fragment the  stone.    Total laser energy utilized was 15K Joules. The stone was fragmented into fragments small  enough to remove through the access sheath. The fragments were then removed with a with a stone basket and removed. Once all fragments had been removed, we  examined all the calyces and renal pelvis again twice and no residual fragments were seen  endoscopically or radiographically. We injected 5 mL of contrast to opacify the renal pelvis, and  then removed the ureteroscope and access sheath. The ureter was carefully examined as we  removed the scope and there was no evidence of injury, perforation, or retained calculus.      We elected to leave an indwelling double-J stent to prevent obstruction due to edema or spasm.  The cystoscope was replaced into the bladder and the 6-Swedish open-ended ureteral catheter  and Glidewire were advanced back into the ureter. We measured the ureter from the UPJ to the  UO and it measured 26 cm. We then deployed a double-J stent in a standard fashion with the  proximal curl in the renal pelvis and the distal curl in the bladder.  The bladder was drained with the cystoscope and the cystoscope was removed. The  patient tolerated the procedure well, was extubated, and transported to the recovery room in  stable condition. The family was informed of the outcome following the procedure.    Plan:  F/u in 1-2 weeks with KUB  Cipro for one week    03/20/2024  12:55 PM

## 2024-03-20 NOTE — ANESTHESIA POSTPROCEDURE EVALUATION
Anesthesia Post Evaluation    Patient: Manpreet Josue    Procedure(s) Performed: Procedure(s) (LRB):  CYSTOURETEROSCOPY,WITH HOLMIUM LASER LITHOTRIPSY OF URETERAL CALCULUS (Left ureteroscopy possible laser litho & possible stent) (Left)  INSERTION, STENT, URETER (N/A)    Final Anesthesia Type: general      Patient location during evaluation: PACU  Patient participation: Yes- Able to Participate  Level of consciousness: awake and alert  Post-procedure vital signs: reviewed and stable  Pain management: adequate  Airway patency: patent  MARBELLA mitigation strategies: Multimodal analgesia, Verification of full reversal of neuromuscular block and Extubation and recovery carried out in lateral, semiupright, or other nonsupine position  PONV status at discharge: No PONV  Anesthetic complications: no      Cardiovascular status: hemodynamically stable  Respiratory status: unassisted, spontaneous ventilation and room air  Hydration status: euvolemic  Follow-up not needed.              Vitals Value Taken Time   /76 03/20/24 1259   Temp  03/20/24 1300   Pulse 77 03/20/24 1300   Resp 12 03/20/24 1300   SpO2 97 % 03/20/24 1300   Vitals shown include unvalidated device data.      No case tracking events are documented in the log.      Pain/Jcarlos Score: No data recorded

## 2024-03-20 NOTE — ANESTHESIA PROCEDURE NOTES
Intubation    Date/Time: 3/20/2024 11:51 AM    Performed by: Huy Bruno CRNA  Authorized by: Kristin Young MD    Intubation:     Induction:  Intravenous    Intubated:  Postinduction    Mask Ventilation:  Easy mask    Attempts:  1    Attempted By:  CRNA    Difficult Airway Encountered?: No      Complications:  None    Airway Device:  Supraglottic airway/LMA (IGEL)    Airway Device Size:  4.0    Style/Cuff Inflation:  Uncuffed    Placement Verified By:  Capnometry    Complicating Factors:  None    Findings Post-Intubation:  BS equal bilateral and atraumatic/condition of teeth unchanged

## 2024-03-20 NOTE — DISCHARGE SUMMARY
[facility] - Periop Services  Discharge Note  Short Stay    Procedure(s) (LRB):  CYSTOURETEROSCOPY,WITH HOLMIUM LASER LITHOTRIPSY OF URETERAL CALCULUS (Left ureteroscopy possible laser litho & possible stent) (Left)  INSERTION, STENT, URETER (N/A)      OUTCOME: Patient tolerated treatment/procedure well without complication and is now ready for discharge.    DISPOSITION: Home or Self Care    FINAL DIAGNOSIS:  Renal calculi    FOLLOWUP: In clinic    DISCHARGE INSTRUCTIONS:    Discharge Procedure Orders   Diet Adult Regular     Lifting restrictions     Notify your health care provider if you experience any of the following:  temperature >100.4     Notify your health care provider if you experience any of the following:  persistent nausea and vomiting or diarrhea     Notify your health care provider if you experience any of the following:  severe uncontrolled pain         Clinical Reference Documents Added to Patient Instructions         Document    GENERAL ANESTHESIA DISCHARGE INSTRUCTIONS (ENGLISH)    LASER LITHOTRIPSY FOR KIDNEY STONES DISCHARGE INSTRUCTIONS (ENGLISH)            TIME SPENT ON DISCHARGE: 15 minutes

## 2024-03-20 NOTE — TRANSFER OF CARE
"Anesthesia Transfer of Care Note    Patient: Manpreet Josue    Procedure(s) Performed: Procedure(s) (LRB):  CYSTOURETEROSCOPY,WITH HOLMIUM LASER LITHOTRIPSY OF URETERAL CALCULUS (Left ureteroscopy possible laser litho & possible stent) (Left)  INSERTION, STENT, URETER (N/A)    Patient location: PACU    Anesthesia Type: general    Transport from OR: Transported from OR on room air with adequate spontaneous ventilation    Post pain: adequate analgesia    Post assessment: no apparent anesthetic complications    Post vital signs: stable    Level of consciousness: responds to stimulation and sedated    Nausea/Vomiting: no nausea/vomiting    Complications: none    Transfer of care protocol was followed      Last vitals: Visit Vitals  BP (!) 165/79 (BP Location: Right arm, Patient Position: Lying)   Pulse 65   Temp 36.8 °C (98.3 °F) (Oral)   Resp 18   Ht 5' 9" (1.753 m)   Wt 88.9 kg (195 lb 15.8 oz)   SpO2 99%   BMI 28.94 kg/m²     "

## 2024-03-22 ENCOUNTER — PATIENT MESSAGE (OUTPATIENT)
Dept: ADMINISTRATIVE | Facility: OTHER | Age: 54
End: 2024-03-22
Payer: MEDICAID

## 2024-03-25 ENCOUNTER — TELEPHONE (OUTPATIENT)
Dept: SURGERY | Facility: HOSPITAL | Age: 54
End: 2024-03-25
Payer: MEDICAID

## 2024-03-25 VITALS
BODY MASS INDEX: 29.03 KG/M2 | OXYGEN SATURATION: 96 % | SYSTOLIC BLOOD PRESSURE: 141 MMHG | TEMPERATURE: 98 F | DIASTOLIC BLOOD PRESSURE: 77 MMHG | HEART RATE: 78 BPM | WEIGHT: 196 LBS | HEIGHT: 69 IN | RESPIRATION RATE: 18 BRPM

## 2024-04-03 ENCOUNTER — HOSPITAL ENCOUNTER (OUTPATIENT)
Dept: RADIOLOGY | Facility: HOSPITAL | Age: 54
Discharge: HOME | End: 2024-04-03
Attending: UROLOGY
Payer: MEDICAID

## 2024-04-03 DIAGNOSIS — N20.0 CALCULUS OF KIDNEY: ICD-10-CM

## 2024-04-03 PROCEDURE — 74019 RADEX ABDOMEN 2 VIEWS: CPT | Mod: TC

## 2024-04-09 LAB — PSYCHE PATHOLOGY RESULT: NORMAL

## (undated) DEVICE — GLOVE SIGNATURE ESSNTL LTX 8

## (undated) DEVICE — CONTRAST ISOVUE 300 50ML

## (undated) DEVICE — KIT MANIFOLD LOW PRESS TUBING

## (undated) DEVICE — TOWEL OR DISP STRL BLUE 4/PK

## (undated) DEVICE — SEE MEDLINE ITEM 107746

## (undated) DEVICE — DRAPE T CYSTOSCOPY STERILE

## (undated) DEVICE — FIBER HOLMIUM LASER RED 273UM

## (undated) DEVICE — MARKER SKIN RULER AND LABEL

## (undated) DEVICE — SHEATH FLEXOR URET 10.7FRX35CM

## (undated) DEVICE — Device

## (undated) DEVICE — LEGGINGS 48X31 INCH

## (undated) DEVICE — SET CYSTO IRR DRP CHMBR 84IN

## (undated) DEVICE — BASKET STONE RETRV 1.9FRX120CM

## (undated) DEVICE — KIT VALVE HEMSTAS ACCESS-9

## (undated) DEVICE — GOWN SMARTGOWN LVL4 X-LONG XL

## (undated) DEVICE — CATH IMPULSE FL5 5FR

## (undated) DEVICE — GOWN POLY REINF BRTH SLV XL

## (undated) DEVICE — PAD DEFIB RADIOTRANSPARENT

## (undated) DEVICE — TUBE CONNECTING 10IN

## (undated) DEVICE — GUIDEWIRE STF STR .035X150CM

## (undated) DEVICE — URETEROSCOPE FLX 1.2X650X3.1MM

## (undated) DEVICE — WIRE GUIDE 0.038OLD

## (undated) DEVICE — GUIDEWIRE OMNI STR TIP 185CM

## (undated) DEVICE — SPONGE COTTON TRAY 4X4IN

## (undated) DEVICE — BLLN NC EUPHORA 4X12 RX

## (undated) DEVICE — GUIDE LAUNCHER 6FR EBU 3.5

## (undated) DEVICE — ANGIOTOUCH KIT

## (undated) DEVICE — GUIDE LAUNCHER 5FR EBU 3.5

## (undated) DEVICE — SYR 10CC LUER LOCK

## (undated) DEVICE — SHEATH 6FR 11CM

## (undated) DEVICE — CATH EAGLE EYE ST .014X20X150

## (undated) DEVICE — SHEATH FLEXOR ACCESS 12X35

## (undated) DEVICE — GUIDEWIRE INQWIRE SE 3MM JTIP

## (undated) DEVICE — DEVICE BASIXCOMPAK INFL 20ML

## (undated) DEVICE — CATH JR4 5FR

## (undated) DEVICE — CATH PIGTAIL 5F 110CM

## (undated) DEVICE — CATH POLLACK OPEN-END FLEXI-TI

## (undated) DEVICE — CATH EXPO FL4 5F .045IN 100CM

## (undated) DEVICE — GUIDEWIRE PROWATER .014X180CM

## (undated) DEVICE — HEMOSTAT VASC BAND LONG 27CM